# Patient Record
Sex: FEMALE | Race: WHITE | Employment: FULL TIME | URBAN - METROPOLITAN AREA
[De-identification: names, ages, dates, MRNs, and addresses within clinical notes are randomized per-mention and may not be internally consistent; named-entity substitution may affect disease eponyms.]

---

## 2017-02-02 RX ORDER — BUPROPION HYDROCHLORIDE 75 MG/1
75 TABLET ORAL 2 TIMES DAILY
COMMUNITY
Start: 2017-02-02 | End: 2017-02-02 | Stop reason: SDUPTHER

## 2017-02-02 RX ORDER — NALTREXONE HYDROCHLORIDE 50 MG/1
50 TABLET, FILM COATED ORAL DAILY
COMMUNITY
Start: 2017-02-02 | End: 2017-02-02 | Stop reason: SDUPTHER

## 2017-02-06 RX ORDER — BUPROPION HYDROCHLORIDE 75 MG/1
75 TABLET ORAL DAILY
Qty: 90 TABLET | Refills: 3 | Status: SHIPPED | OUTPATIENT
Start: 2017-02-06 | End: 2017-12-29

## 2017-02-06 RX ORDER — NALTREXONE HYDROCHLORIDE 50 MG/1
50 TABLET, FILM COATED ORAL DAILY
Qty: 90 TABLET | Refills: 3 | Status: SHIPPED | OUTPATIENT
Start: 2017-02-06 | End: 2018-01-12 | Stop reason: ALTCHOICE

## 2017-03-22 ENCOUNTER — HOSPITAL ENCOUNTER (OUTPATIENT)
Dept: WOMENS IMAGING | Age: 67
Discharge: HOME | End: 2017-03-22
Attending: OBSTETRICS & GYNECOLOGY | Admitting: OBSTETRICS & GYNECOLOGY

## 2017-03-22 DIAGNOSIS — Z12.31 VISIT FOR SCREENING MAMMOGRAM: ICD-10-CM

## 2017-04-13 ENCOUNTER — EMPLOYEE WELLNESS (OUTPATIENT)
Dept: OTHER | Age: 67
End: 2017-04-13

## 2017-04-13 LAB
CHOLESTEROL: 259 MG/DL
GLUCOSE BLD-MCNC: 81 MG/DL (ref 70–140)
HDLC SERPL-MCNC: 73 MG/DL
LDL CHOLESTEROL CALCULATED: 138 MG/DL
PATIENT FASTING?: YES
TRIGL SERPL-MCNC: 238 MG/DL

## 2017-06-15 ENCOUNTER — HOSPITAL ENCOUNTER (OUTPATIENT)
Dept: GENERAL RADIOLOGY | Age: 67
Discharge: HOME | End: 2017-06-15
Attending: FAMILY MEDICINE | Admitting: FAMILY MEDICINE

## 2017-06-15 LAB
ALBUMIN SERPL-MCNC: 4.2 GM/DL (ref 3.4–5)
ALP BLD-CCNC: 126 IU/L (ref 40–128)
ALT SERPL-CCNC: 14 U/L (ref 10–40)
ANION GAP SERPL CALCULATED.3IONS-SCNC: 14 MMOL/L (ref 4–16)
AST SERPL-CCNC: 13 IU/L (ref 15–37)
BASOPHILS ABSOLUTE: 0.1 K/CU MM
BASOPHILS RELATIVE PERCENT: 0.6 % (ref 0–1)
BILIRUB SERPL-MCNC: 0.7 MG/DL (ref 0–1)
BUN BLDV-MCNC: 18 MG/DL (ref 6–23)
CALCIUM SERPL-MCNC: 9.5 MG/DL (ref 8.3–10.6)
CHLORIDE BLD-SCNC: 97 MMOL/L (ref 99–110)
CHOLESTEROL: 246 MG/DL
CO2: 29 MMOL/L (ref 21–32)
CREAT SERPL-MCNC: 1 MG/DL (ref 0.6–1.1)
DIFFERENTIAL TYPE: ABNORMAL
EOSINOPHILS ABSOLUTE: 0.2 K/CU MM
EOSINOPHILS RELATIVE PERCENT: 1.6 % (ref 0–3)
GFR AFRICAN AMERICAN: >60 ML/MIN/1.73M2
GFR NON-AFRICAN AMERICAN: 55 ML/MIN/1.73M2
GLUCOSE FASTING: 85 MG/DL (ref 70–99)
HCT VFR BLD CALC: 43.5 % (ref 37–47)
HDLC SERPL-MCNC: 72 MG/DL
HEMOGLOBIN: 13.5 GM/DL (ref 12.5–16)
IMMATURE NEUTROPHIL %: 0.4 % (ref 0–0.43)
LDL CHOLESTEROL DIRECT: 177 MG/DL
LYMPHOCYTES ABSOLUTE: 1.7 K/CU MM
LYMPHOCYTES RELATIVE PERCENT: 11.7 % (ref 24–44)
MCH RBC QN AUTO: 28 PG (ref 27–31)
MCHC RBC AUTO-ENTMCNC: 31 % (ref 32–36)
MCV RBC AUTO: 90.2 FL (ref 78–100)
MONOCYTES ABSOLUTE: 1.2 K/CU MM
MONOCYTES RELATIVE PERCENT: 8.6 % (ref 0–4)
NUCLEATED RBC %: 0 %
PDW BLD-RTO: 13.4 % (ref 11.7–14.9)
PLATELET # BLD: 299 K/CU MM (ref 140–440)
PMV BLD AUTO: 10.9 FL (ref 7.5–11.1)
POTASSIUM SERPL-SCNC: 4.4 MMOL/L (ref 3.5–5.1)
RBC # BLD: 4.82 M/CU MM (ref 4.2–5.4)
SEGMENTED NEUTROPHILS ABSOLUTE COUNT: 10.9 K/CU MM
SEGMENTED NEUTROPHILS RELATIVE PERCENT: 77.1 % (ref 36–66)
SODIUM BLD-SCNC: 140 MMOL/L (ref 135–145)
TOTAL IMMATURE NEUTOROPHIL: 0.05 K/CU MM
TOTAL NUCLEATED RBC: 0 K/CU MM
TOTAL PROTEIN: 7 GM/DL (ref 6.4–8.2)
TRIGL SERPL-MCNC: 95 MG/DL
WBC # BLD: 14.2 K/CU MM (ref 4–10.5)

## 2017-06-18 LAB
ENA TO SSA (RO) ANTIBODY: 5
ENA TO SSB (LA) ANTIBODY: 0
SSA 60 RO IGG ANTIBODY: 10

## 2017-09-14 DIAGNOSIS — I82.5Y2 CHRONIC DEEP VEIN THROMBOSIS (DVT) OF PROXIMAL VEIN OF LEFT LOWER EXTREMITY (HCC): Primary | ICD-10-CM

## 2017-09-19 ENCOUNTER — PROCEDURE VISIT (OUTPATIENT)
Dept: CARDIOLOGY CLINIC | Age: 67
End: 2017-09-19

## 2017-09-19 DIAGNOSIS — I82.5Y2 CHRONIC DEEP VEIN THROMBOSIS (DVT) OF PROXIMAL VEIN OF LEFT LOWER EXTREMITY (HCC): Primary | ICD-10-CM

## 2017-09-19 PROCEDURE — 93971 EXTREMITY STUDY: CPT | Performed by: INTERNAL MEDICINE

## 2017-09-20 ENCOUNTER — TELEPHONE (OUTPATIENT)
Dept: CARDIOLOGY CLINIC | Age: 67
End: 2017-09-20

## 2017-09-20 DIAGNOSIS — I87.2 VENOUS REFLUX: Primary | ICD-10-CM

## 2017-12-20 DIAGNOSIS — R06.02 SOBOE (SHORTNESS OF BREATH ON EXERTION): ICD-10-CM

## 2017-12-22 ENCOUNTER — PROCEDURE VISIT (OUTPATIENT)
Dept: CARDIOLOGY CLINIC | Age: 67
End: 2017-12-22

## 2017-12-22 DIAGNOSIS — R06.02 SOBOE (SHORTNESS OF BREATH ON EXERTION): Primary | ICD-10-CM

## 2017-12-22 LAB
LV EF: 55 %
LVEF MODALITY: NORMAL

## 2017-12-22 PROCEDURE — 93306 TTE W/DOPPLER COMPLETE: CPT | Performed by: INTERNAL MEDICINE

## 2018-01-03 PROBLEM — I83.893 VARICOSE VEINS OF LEG WITH SWELLING, BILATERAL: Status: ACTIVE | Noted: 2018-01-03

## 2018-01-11 ENCOUNTER — HOSPITAL ENCOUNTER (OUTPATIENT)
Dept: OTHER | Age: 68
Discharge: HOME | End: 2018-01-11
Attending: OBSTETRICS & GYNECOLOGY | Admitting: OBSTETRICS & GYNECOLOGY

## 2018-01-17 PROBLEM — M79.89 LEFT LEG SWELLING: Status: ACTIVE | Noted: 2018-01-17

## 2018-02-12 ENCOUNTER — HOSPITAL ENCOUNTER (OUTPATIENT)
Dept: MRI IMAGING | Age: 68
Discharge: HOME | End: 2018-02-12
Attending: FAMILY MEDICINE | Admitting: FAMILY MEDICINE

## 2018-02-12 DIAGNOSIS — M54.5 LOW BACK PAIN, UNSPECIFIED BACK PAIN LATERALITY, UNSPECIFIED CHRONICITY, WITH SCIATICA PRESENCE UNSPECIFIED: ICD-10-CM

## 2018-03-20 VITALS — WEIGHT: 216 LBS | BODY MASS INDEX: 37.08 KG/M2

## 2018-04-19 ENCOUNTER — EMPLOYEE WELLNESS (OUTPATIENT)
Dept: OTHER | Age: 68
End: 2018-04-19

## 2018-04-19 LAB
CHOLESTEROL: 271 MG/DL
GLUCOSE BLD-MCNC: 79 MG/DL (ref 70–99)
HDLC SERPL-MCNC: 72 MG/DL
LDL CHOLESTEROL CALCULATED: 170 MG/DL
PATIENT FASTING?: YES
TRIGL SERPL-MCNC: 146 MG/DL

## 2018-04-23 VITALS — BODY MASS INDEX: 36.73 KG/M2 | WEIGHT: 214 LBS

## 2018-04-30 ENCOUNTER — HOSPITAL ENCOUNTER (OUTPATIENT)
Dept: WOMENS IMAGING | Age: 68
Discharge: HOME | End: 2018-04-30
Attending: OBSTETRICS & GYNECOLOGY | Admitting: OBSTETRICS & GYNECOLOGY

## 2018-04-30 DIAGNOSIS — Z12.31 VISIT FOR SCREENING MAMMOGRAM: ICD-10-CM

## 2018-09-28 ENCOUNTER — HOSPITAL ENCOUNTER (OUTPATIENT)
Dept: GENERAL RADIOLOGY | Age: 68
Discharge: HOME | End: 2018-09-28
Payer: COMMERCIAL

## 2018-09-28 ENCOUNTER — HOSPITAL ENCOUNTER (OUTPATIENT)
Age: 68
Discharge: HOME | End: 2018-09-28
Payer: COMMERCIAL

## 2018-09-28 DIAGNOSIS — R06.00 DYSPNEA, UNSPECIFIED TYPE: ICD-10-CM

## 2018-09-28 DIAGNOSIS — R52 PAIN: ICD-10-CM

## 2018-09-28 LAB
BASOPHILS ABSOLUTE: 0.1 K/CU MM
BASOPHILS RELATIVE PERCENT: 0.5 % (ref 0–1)
CHOLESTEROL: 271 MG/DL
DIFFERENTIAL TYPE: ABNORMAL
EOSINOPHILS ABSOLUTE: 0 K/CU MM
EOSINOPHILS RELATIVE PERCENT: 0 % (ref 0–3)
HCT VFR BLD CALC: 49.5 % (ref 37–47)
HDLC SERPL-MCNC: 73 MG/DL
HEMOGLOBIN: 14.6 GM/DL (ref 12.5–16)
IMMATURE NEUTROPHIL %: 0.8 % (ref 0–0.43)
LDL CHOLESTEROL DIRECT: 182 MG/DL
LYMPHOCYTES ABSOLUTE: 1.3 K/CU MM
LYMPHOCYTES RELATIVE PERCENT: 11.2 % (ref 24–44)
MCH RBC QN AUTO: 28.7 PG (ref 27–31)
MCHC RBC AUTO-ENTMCNC: 29.5 % (ref 32–36)
MCV RBC AUTO: 97.2 FL (ref 78–100)
MONOCYTES ABSOLUTE: 0.2 K/CU MM
MONOCYTES RELATIVE PERCENT: 2 % (ref 0–4)
NUCLEATED RBC %: 0 %
PDW BLD-RTO: 12.7 % (ref 11.7–14.9)
PLATELET # BLD: 271 K/CU MM (ref 140–440)
PMV BLD AUTO: 11.3 FL (ref 7.5–11.1)
RBC # BLD: 5.09 M/CU MM (ref 4.2–5.4)
REASON FOR REJECTION: NORMAL
REJECTED TEST: NORMAL
SEGMENTED NEUTROPHILS ABSOLUTE COUNT: 9.6 K/CU MM
SEGMENTED NEUTROPHILS RELATIVE PERCENT: 85.5 % (ref 36–66)
SOURCE: NORMAL
TOTAL IMMATURE NEUTOROPHIL: 0.09 K/CU MM
TOTAL NUCLEATED RBC: 0 K/CU MM
TRIGL SERPL-MCNC: 118 MG/DL
TSH HIGH SENSITIVITY: 0.78 UIU/ML (ref 0.27–4.2)
URIC ACID: 6.9 MG/DL (ref 2.6–6)
WBC # BLD: 11.2 K/CU MM (ref 4–10.5)

## 2018-09-28 PROCEDURE — 85025 COMPLETE CBC W/AUTO DIFF WBC: CPT

## 2018-09-28 PROCEDURE — 83721 ASSAY OF BLOOD LIPOPROTEIN: CPT

## 2018-09-28 PROCEDURE — 71046 X-RAY EXAM CHEST 2 VIEWS: CPT

## 2018-09-28 PROCEDURE — 80061 LIPID PANEL: CPT

## 2018-09-28 PROCEDURE — 84443 ASSAY THYROID STIM HORMONE: CPT

## 2018-09-28 PROCEDURE — 36415 COLL VENOUS BLD VENIPUNCTURE: CPT

## 2018-09-28 PROCEDURE — 84550 ASSAY OF BLOOD/URIC ACID: CPT

## 2018-09-28 PROCEDURE — 73140 X-RAY EXAM OF FINGER(S): CPT

## 2018-10-11 ENCOUNTER — HOSPITAL ENCOUNTER (OUTPATIENT)
Age: 68
Setting detail: SPECIMEN
Discharge: HOME | End: 2018-10-11
Payer: COMMERCIAL

## 2018-10-11 LAB
ALBUMIN SERPL-MCNC: 4.4 GM/DL (ref 3.4–5)
ALP BLD-CCNC: 114 IU/L (ref 40–128)
ALT SERPL-CCNC: 24 U/L (ref 10–40)
ANION GAP SERPL CALCULATED.3IONS-SCNC: 16 MMOL/L (ref 4–16)
AST SERPL-CCNC: 19 IU/L (ref 15–37)
BILIRUB SERPL-MCNC: 0.4 MG/DL (ref 0–1)
BUN BLDV-MCNC: 19 MG/DL (ref 6–23)
CALCIUM SERPL-MCNC: 9.4 MG/DL (ref 8.3–10.6)
CHLORIDE BLD-SCNC: 99 MMOL/L (ref 99–110)
CO2: 25 MMOL/L (ref 21–32)
CREAT SERPL-MCNC: 0.9 MG/DL (ref 0.6–1.1)
GFR AFRICAN AMERICAN: >60 ML/MIN/1.73M2
GFR NON-AFRICAN AMERICAN: >60 ML/MIN/1.73M2
GLUCOSE BLD-MCNC: 91 MG/DL (ref 70–99)
POTASSIUM SERPL-SCNC: 3.7 MMOL/L (ref 3.5–5.1)
SODIUM BLD-SCNC: 140 MMOL/L (ref 135–145)
TOTAL PROTEIN: 7.2 GM/DL (ref 6.4–8.2)

## 2018-10-11 PROCEDURE — 36415 COLL VENOUS BLD VENIPUNCTURE: CPT

## 2018-10-11 PROCEDURE — 80053 COMPREHEN METABOLIC PANEL: CPT

## 2018-11-27 ENCOUNTER — HOSPITAL ENCOUNTER (OUTPATIENT)
Dept: PHYSICAL THERAPY | Age: 68
Setting detail: THERAPIES SERIES
Discharge: HOME | End: 2018-11-27
Payer: COMMERCIAL

## 2018-11-27 PROCEDURE — G8979 MOBILITY GOAL STATUS: HCPCS

## 2018-11-27 PROCEDURE — G8978 MOBILITY CURRENT STATUS: HCPCS

## 2018-11-27 PROCEDURE — 97162 PT EVAL MOD COMPLEX 30 MIN: CPT

## 2018-11-27 PROCEDURE — 97140 MANUAL THERAPY 1/> REGIONS: CPT

## 2018-11-27 PROCEDURE — 97110 THERAPEUTIC EXERCISES: CPT

## 2018-11-27 ASSESSMENT — PAIN DESCRIPTION - LOCATION: LOCATION: BACK

## 2018-11-27 ASSESSMENT — PAIN DESCRIPTION - DESCRIPTORS: DESCRIPTORS: ACHING;SHARP;THROBBING

## 2018-11-27 ASSESSMENT — PAIN DESCRIPTION - FREQUENCY: FREQUENCY: INTERMITTENT

## 2018-11-27 ASSESSMENT — PAIN SCALES - GENERAL: PAINLEVEL_OUTOF10: 5

## 2018-11-27 ASSESSMENT — PAIN DESCRIPTION - ORIENTATION: ORIENTATION: RIGHT;LEFT;LOWER

## 2018-11-27 ASSESSMENT — PAIN DESCRIPTION - PAIN TYPE: TYPE: CHRONIC PAIN

## 2018-11-27 NOTE — FLOWSHEET NOTE
advised that doing stabilization ex may be the best for her spine rehab. Objectives: See Eval     Response to intervention: Pt was about the same     Post Tx Pain Ratin/10 at rest; 5/10 with standing     Overall change since Evaluation:      Prognosis: FAIR     Plan for Next Session: Plan to advance the exercises for trunk stability, jose LE strengthening and flexibility.        Plan:  _2_x/week x 5__ weeks    Intervention used today:  [x] Therapeutic Exercise    [] Therapeutic Activity     [] Ultrasound  [] Elec  Stim  [] Gait Training      [] Cervical Traction [] Lumbar Traction  [] Neuromuscular Re-education    [] Cold/hotpack [] Iontophoresis   [x] Instruction in HEP      [] Vasopneumatic     [x] Manual Therapy               [x] Self care home management                    (    ) Dry needling    Time In: 0427  Time Out: 1288  Timed Code Treatment Minutes: 23   Total Treatment Minutes: 40     Electronically signed by:  Arianna Bruner PT 2018, 4:00 PM

## 2018-11-27 NOTE — PLAN OF CARE
Re-education [x] Cold/hotpack [] Iontophoresis   [x] Instruction in HEP       [x] Manual Therapy     [] vasopneumatic            [x] Self care home management        [x]Dry needling trigger point point/pain management      Plan of Care Date Range:  11/27/18 to 1/8/2019    ? Frequency/Duration:  # Days per week: [] 1 day # Weeks: [] 1 week [x] 5 weeks     [x] 2 days? [] 2 weeks [] 6 weeks     [] 3 days   [] 3 weeks [] 7 weeks     [] 4 days   [] 4 weeks [] 8 weeks    Rehab Potential: [] Excellent [] Good [x] Fair  [] Poor     Goals:     Long term goals  Time Frame for Long term goals : All goals to be assessed by the 10th visit  Long term goal 1: Pt to be independent with HEP  Long term goal 2: Pt to increase jose LE strength  Long term goal 3: Pt to report overall pain decreased  Long term goal 4: Pt to improve Oswestry score    Electronically signed by:  Kristin Anguiano PT, 11/27/2018, 3:58 PM          If you have any questions or concerns, please don't hesitate to call.   Thank you for your referral.    Physician Signature:_________________Date:____________Time: ________  By signing above, therapists plan is approved by physician

## 2018-12-03 ENCOUNTER — TELEPHONE (OUTPATIENT)
Dept: CARDIOLOGY CLINIC | Age: 68
End: 2018-12-03

## 2018-12-06 ENCOUNTER — HOSPITAL ENCOUNTER (OUTPATIENT)
Dept: PHYSICAL THERAPY | Age: 68
Setting detail: THERAPIES SERIES
Discharge: HOME | End: 2018-12-06
Payer: COMMERCIAL

## 2018-12-06 ENCOUNTER — HOSPITAL ENCOUNTER (OUTPATIENT)
Dept: CT IMAGING | Age: 68
Discharge: HOME | End: 2018-12-06
Payer: COMMERCIAL

## 2018-12-06 DIAGNOSIS — R06.00 DYSPNEA AND RESPIRATORY ABNORMALITY: ICD-10-CM

## 2018-12-06 DIAGNOSIS — R06.89 DYSPNEA AND RESPIRATORY ABNORMALITY: ICD-10-CM

## 2018-12-06 LAB
GFR AFRICAN AMERICAN: 54 ML/MIN/1.73M2
GFR NON-AFRICAN AMERICAN: 44 ML/MIN/1.73M2
POC CREATININE: 1.2 MG/DL (ref 0.6–1.1)

## 2018-12-06 PROCEDURE — 97112 NEUROMUSCULAR REEDUCATION: CPT

## 2018-12-06 PROCEDURE — 6360000004 HC RX CONTRAST MEDICATION: Performed by: FAMILY MEDICINE

## 2018-12-06 PROCEDURE — 71275 CT ANGIOGRAPHY CHEST: CPT

## 2018-12-06 PROCEDURE — 2580000003 HC RX 258: Performed by: FAMILY MEDICINE

## 2018-12-06 PROCEDURE — 97110 THERAPEUTIC EXERCISES: CPT

## 2018-12-06 RX ORDER — 0.9 % SODIUM CHLORIDE 0.9 %
10 VIAL (ML) INJECTION PRN
Status: DISCONTINUED | OUTPATIENT
Start: 2018-12-06 | End: 2018-12-07 | Stop reason: HOSPADM

## 2018-12-06 RX ADMIN — SODIUM CHLORIDE, PRESERVATIVE FREE 10 ML: 5 INJECTION INTRAVENOUS at 11:35

## 2018-12-06 RX ADMIN — IOPAMIDOL 75 ML: 755 INJECTION, SOLUTION INTRAVENOUS at 11:34

## 2018-12-11 ENCOUNTER — HOSPITAL ENCOUNTER (OUTPATIENT)
Dept: PHYSICAL THERAPY | Age: 68
Discharge: HOME | End: 2018-12-11

## 2018-12-11 NOTE — FLOWSHEET NOTE
Physical Therapy  Cancellation/No-show Note  Patient Name:  Humberto Gonzáles  :  1950   Date:  2018  Cancelled visits to date: 1  No-shows to date: 0    For today's appointment patient:  [x]  Cancelled  []  Rescheduled appointment  []  No-show     Reason given by patient:  []  Patient ill  []  Conflicting appointment  []  No transportation    []  Conflict with work  [x]  No reason given to   []  Other:     Comments:      Electronically signed by:  Volodymyr Chopra PTA, 2018, 8:52 AM

## 2018-12-17 DIAGNOSIS — R07.89 OTHER CHEST PAIN: Primary | ICD-10-CM

## 2018-12-17 DIAGNOSIS — R06.02 SOBOE (SHORTNESS OF BREATH ON EXERTION): ICD-10-CM

## 2018-12-18 ENCOUNTER — HOSPITAL ENCOUNTER (OUTPATIENT)
Dept: PHYSICAL THERAPY | Age: 68
Setting detail: THERAPIES SERIES
Discharge: HOME | End: 2018-12-18
Payer: COMMERCIAL

## 2018-12-18 ENCOUNTER — PROCEDURE VISIT (OUTPATIENT)
Dept: CARDIOLOGY CLINIC | Age: 68
End: 2018-12-18

## 2018-12-18 DIAGNOSIS — R06.02 SOBOE (SHORTNESS OF BREATH ON EXERTION): Primary | ICD-10-CM

## 2018-12-18 DIAGNOSIS — R07.89 OTHER CHEST PAIN: ICD-10-CM

## 2018-12-18 LAB
LV EF: 58 %
LVEF MODALITY: NORMAL

## 2018-12-18 PROCEDURE — 97110 THERAPEUTIC EXERCISES: CPT

## 2018-12-18 NOTE — FLOWSHEET NOTE
Physical Therapy Daily Treatment Note  Date:  2018    Patient Name:  Ricardo Lance    :  1950  MRN: [id number]  Other position/activity restrictions: None noted   Diagnosis: Severe LBP, OA spine with spinal stenosis  Treatment Diagnosis: Muscular weakness, decreased function, pain  Date of injury/Date of Surgery  PT Insurance Information: [employer]  Referring Practitioner: Josiah Ewing MD  Referring Practitioner Follow-Up:     POC Signed: pending  POC Date Range:18 to 2019    Progress Note Due:  10th visit  Visit# / total visits: 3 10                    Goals:     Long term goals  Time Frame for Long term goals : All goals to be assessed by the 10th visit  Long term goal 1: Pt to be independent with HEP  Long term goal 2: Pt to increase jose LE strength  Long term goal 3: Pt to report overall pain decreased  Long term goal 4: Pt to improve Oswestry score       Summary of Eval: Pt has severe pain in the low back, can't stand very long, can't walk very long (100 yards) without leaning on something. She can stand only 20 seconds to be able to do dishes then she is miserable. Pt does better if she is upright. Pt has 0/10 pain with sitting, standing 5/10. Pt had to leave early for another appointment and was going to call later to schedule appointments. INITIAL PAIN LEVEL:  0/10 At rest   SUBJECTIVE:  Pt states she is about the same// with walking I can walk 5-10 minutes    Any changes to Ambulatory Summary Sheet? Any major status changes?      ACTIVITIES: Date 18 (1) Date 18 (2) [id number] #3   Outcomes Measure      nustep seat 10/arms11 lv3        Abdominal bracing (seated) 3 ct x 10 reps 3 ct x 10 reps      PPT  Supine 3 ct x 10 reps Supine 3 ct x 10 reps     LTR  X 10 reps 3 ct Supine 3 ct x 10 reps     Hamstring stretch  10 ct x 3 reps ea 10 ct x 3 reps ea     Calf stretch        PPT w/ marching        SLR  X 10 reps X 10 reps R/L     Bridging    2 x 5 reps

## 2018-12-27 ENCOUNTER — HOSPITAL ENCOUNTER (OUTPATIENT)
Dept: PHYSICAL THERAPY | Age: 68
Setting detail: THERAPIES SERIES
Discharge: HOME | End: 2018-12-27
Payer: COMMERCIAL

## 2018-12-27 PROCEDURE — 97112 NEUROMUSCULAR REEDUCATION: CPT

## 2018-12-27 PROCEDURE — 97110 THERAPEUTIC EXERCISES: CPT

## 2019-01-02 ENCOUNTER — HOSPITAL ENCOUNTER (OUTPATIENT)
Dept: PHYSICAL THERAPY | Age: 69
Setting detail: THERAPIES SERIES
Discharge: HOME | End: 2019-01-02
Payer: COMMERCIAL

## 2019-02-18 ENCOUNTER — OFFICE VISIT (OUTPATIENT)
Dept: ORTHOPEDIC SURGERY | Age: 69
End: 2019-02-18
Payer: COMMERCIAL

## 2019-02-18 VITALS
HEART RATE: 86 BPM | OXYGEN SATURATION: 93 % | BODY MASS INDEX: 46.86 KG/M2 | WEIGHT: 273 LBS | RESPIRATION RATE: 14 BRPM

## 2019-02-18 DIAGNOSIS — M17.12 PRIMARY OSTEOARTHRITIS OF LEFT KNEE: Primary | ICD-10-CM

## 2019-02-18 PROCEDURE — 99203 OFFICE O/P NEW LOW 30 MIN: CPT | Performed by: ORTHOPAEDIC SURGERY

## 2019-02-18 RX ORDER — MELOXICAM 10 MG/1
CAPSULE ORAL
COMMUNITY
End: 2019-04-19

## 2019-02-18 ASSESSMENT — ENCOUNTER SYMPTOMS
BACK PAIN: 0
COLOR CHANGE: 0
CHEST TIGHTNESS: 0

## 2019-03-28 ENCOUNTER — NURSE ONLY (OUTPATIENT)
Dept: CARDIOLOGY CLINIC | Age: 69
End: 2019-03-28
Payer: COMMERCIAL

## 2019-03-28 VITALS
BODY MASS INDEX: 40.46 KG/M2 | HEIGHT: 64 IN | DIASTOLIC BLOOD PRESSURE: 78 MMHG | HEART RATE: 75 BPM | WEIGHT: 237 LBS | SYSTOLIC BLOOD PRESSURE: 122 MMHG

## 2019-03-28 DIAGNOSIS — E78.2 MIXED HYPERLIPIDEMIA: Chronic | ICD-10-CM

## 2019-03-28 DIAGNOSIS — E78.2 MIXED HYPERLIPIDEMIA: Primary | Chronic | ICD-10-CM

## 2019-03-28 PROCEDURE — 93000 ELECTROCARDIOGRAM COMPLETE: CPT | Performed by: INTERNAL MEDICINE

## 2019-04-02 ENCOUNTER — TELEPHONE (OUTPATIENT)
Dept: ORTHOPEDIC SURGERY | Age: 69
End: 2019-04-02

## 2019-04-02 NOTE — TELEPHONE ENCOUNTER
Pt called after reviewing FMLA and she would like to know why her surgery date was changed to  5/7/19. She states that she was unaware of the surgery date change. Instructions 2/18/19         Return in about 2 months (around 4/18/2019). Follow up in April to consult on left knee.   Schedule for April 23 for surgery  Continue weight bearing as tolerated

## 2019-04-05 ENCOUNTER — TELEPHONE (OUTPATIENT)
Dept: ORTHOPEDIC SURGERY | Age: 69
End: 2019-04-05

## 2019-04-05 ENCOUNTER — TELEPHONE (OUTPATIENT)
Dept: CARDIOLOGY CLINIC | Age: 69
End: 2019-04-05

## 2019-04-05 NOTE — TELEPHONE ENCOUNTER
Patient called asking if her medical leave certification was completed with the new surgery date and faxed in

## 2019-04-05 NOTE — TELEPHONE ENCOUNTER
Cardiac clearance request received from Dr. Gabriella Okeefe for L knee TKA scheduled  4/23/19. FAX # [id number]    Patient is cleared for surgery/procedure at a moderate risk per Dr. Robert Espinoza.   Letter completed and faxed

## 2019-04-05 NOTE — TELEPHONE ENCOUNTER
Attempted to contact patient to discuss questions regarding moving joint camp appointment, but patient was unable to be reached. LMOVM for patient to return call to our office. Please inform patient that we cannot move appointment due to joint camp not having availability at this time. All classes are full.

## 2019-04-15 ENCOUNTER — OFFICE VISIT (OUTPATIENT)
Dept: ORTHOPEDIC SURGERY | Age: 69
End: 2019-04-15
Payer: COMMERCIAL

## 2019-04-15 VITALS — HEIGHT: 64 IN | WEIGHT: 240 LBS | OXYGEN SATURATION: 98 % | HEART RATE: 68 BPM | BODY MASS INDEX: 40.97 KG/M2

## 2019-04-15 DIAGNOSIS — M17.12 PRIMARY OSTEOARTHRITIS OF LEFT KNEE: Primary | ICD-10-CM

## 2019-04-15 PROCEDURE — 99212 OFFICE O/P EST SF 10 MIN: CPT | Performed by: ORTHOPAEDIC SURGERY

## 2019-04-15 RX ORDER — LOSARTAN POTASSIUM 100 MG/1
100 TABLET ORAL DAILY
COMMUNITY
End: 2019-04-19

## 2019-04-15 RX ORDER — MAGNESIUM GLUCONATE 27 MG(500)
500 TABLET ORAL DAILY
COMMUNITY

## 2019-04-16 ENCOUNTER — ANESTHESIA EVENT (OUTPATIENT)
Dept: OPERATING ROOM | Age: 69
DRG: 470 | End: 2019-04-16
Payer: COMMERCIAL

## 2019-04-17 ENCOUNTER — TELEPHONE (OUTPATIENT)
Dept: ORTHOPEDIC SURGERY | Age: 69
End: 2019-04-17

## 2019-04-17 ASSESSMENT — ENCOUNTER SYMPTOMS: SHORTNESS OF BREATH: 1

## 2019-04-17 NOTE — TELEPHONE ENCOUNTER
Called and spoke with patient regarding her Lovenox. Per patient she has called and spoke with Nisha Smith in Anesthesiology at [facility] and was informed if it is a General it was 24 hours and if it is a Block it is 72 hours. Per patient in the office Dr. Delmy Ladd told her it was a Block. Informed patient that Dr. Delmy Ladd is in surgery and as soon as he addresses this question I will be back in touch with her. Patient states understanding. Austen Morris MA called surgery scheduling and updated the Anaesthesilogy to a block per Dr. Danay Beltran request. Once Dr. Delmy Ladd responds, please call patient and inform, ASAP.  Negra BOWIE

## 2019-04-17 NOTE — TELEPHONE ENCOUNTER
Left message with instructions to stop her Xarelto start taking lovenox 40 daily, perscription was called into [facility] on [address]. Her last dose is to be taken Monday morning before surgery.

## 2019-04-17 NOTE — ANESTHESIA PRE PROCEDURE
 Hyperlipemia E78.5    Obesity E66.9    DVT of leg (deep venous thrombosis)-L I82.409    SOBOE (shortness of breath on exertion) R06.02    Varicose veins of lower extremities with complications, bilateral I83.893    Left leg swelling M79.89    Other chest pain R07.89       Past Medical History:        Diagnosis Date    Arthritis     H/O Doppler ultrasound 11/2/14    Venous Doppler. Left lower extremity probably old DVT in the distal SFA and popliteal areas otherwise right lower extremity reveals normal findings.  H/O Doppler ultrasound 09/19/2017    LLE - chronic DVT, severe veous reflux    H/O echocardiogram 09/26/2016    EF60% mild MR, TR, mild pulm htn    H/O echocardiogram 07/18/2018    EF55-60% mild TR    Hx of blood clots     \"hx of PE- 2013 and had DVT left leg that went to my lung\"    Hx of Doppler echocardiogram 12/22/2017    EF 50-60% mild biatrial enlargement, mild to mod TR, mildly elevated pulmonary artery pressure.  Hyperlipidemia     Hypertension     Other chest pain 12/17/2018    Spinal stenosis     scheduled for epidural injection 3/19/2018       Past Surgical History:        Procedure Laterality Date    ANKLE SURGERY Left 12+ yrs ago    \"have plate and pins still\"    COLONOSCOPY  2010?  DILATION AND CURETTAGE OF UTERUS  May 2012    TONSILLECTOMY         Social History:    Social History     Tobacco Use    Smoking status: Never Smoker    Smokeless tobacco: Never Used   Substance Use Topics    Alcohol use: Yes     Comment: average\"one time per month\"                                Counseling given: Not Answered      Vital Signs (Current): There were no vitals filed for this visit.                                            BP Readings from Last 3 Encounters:   03/28/19 122/78   05/14/18 138/71   03/19/18 121/69       NPO Status:                                                                                 BMI:   Wt Readings from Last 3 Encounters:   04/15/19 240 lb

## 2019-04-17 NOTE — TELEPHONE ENCOUNTER
Per Dr. Garcia, stop Xarelto, put her on lovenox 40 daily, last dose Monday 4/22/19 am before surgery on Tuesday 4/23/19.

## 2019-04-19 ENCOUNTER — HOSPITAL ENCOUNTER (OUTPATIENT)
Dept: PREADMISSION TESTING | Age: 69
Discharge: HOME | End: 2019-04-23
Payer: COMMERCIAL

## 2019-04-19 VITALS
RESPIRATION RATE: 16 BRPM | DIASTOLIC BLOOD PRESSURE: 77 MMHG | BODY MASS INDEX: 40.97 KG/M2 | HEIGHT: 64 IN | HEART RATE: 84 BPM | WEIGHT: 240 LBS | OXYGEN SATURATION: 96 % | SYSTOLIC BLOOD PRESSURE: 180 MMHG | TEMPERATURE: 99.2 F

## 2019-04-19 LAB
ABO/RH: NORMAL
ALBUMIN SERPL-MCNC: 4.3 GM/DL (ref 3.4–5)
ALP BLD-CCNC: 103 IU/L (ref 40–128)
ALT SERPL-CCNC: 22 U/L (ref 10–40)
ANION GAP SERPL CALCULATED.3IONS-SCNC: 15 MMOL/L (ref 4–16)
ANTIBODY SCREEN: NEGATIVE
AST SERPL-CCNC: 21 IU/L (ref 15–37)
BASOPHILS ABSOLUTE: 0.1 K/CU MM
BASOPHILS RELATIVE PERCENT: 1.2 % (ref 0–1)
BILIRUB SERPL-MCNC: 0.7 MG/DL (ref 0–1)
BUN BLDV-MCNC: 21 MG/DL (ref 6–23)
CALCIUM SERPL-MCNC: 9.7 MG/DL (ref 8.3–10.6)
CHLORIDE BLD-SCNC: 104 MMOL/L (ref 99–110)
CO2: 23 MMOL/L (ref 21–32)
COMMENT: NORMAL
CREAT SERPL-MCNC: 1 MG/DL (ref 0.6–1.1)
DIFFERENTIAL TYPE: ABNORMAL
EOSINOPHILS ABSOLUTE: 0.3 K/CU MM
EOSINOPHILS RELATIVE PERCENT: 3.6 % (ref 0–3)
ERYTHROCYTE SEDIMENTATION RATE: 15 MM/HR (ref 0–30)
GFR AFRICAN AMERICAN: >60 ML/MIN/1.73M2
GFR NON-AFRICAN AMERICAN: 55 ML/MIN/1.73M2
GLUCOSE BLD-MCNC: 100 MG/DL (ref 70–99)
HCT VFR BLD CALC: 47.5 % (ref 37–47)
HEMOGLOBIN: 14.3 GM/DL (ref 12.5–16)
IMMATURE NEUTROPHIL %: 0.3 % (ref 0–0.43)
LYMPHOCYTES ABSOLUTE: 1.7 K/CU MM
LYMPHOCYTES RELATIVE PERCENT: 22.2 % (ref 24–44)
MCH RBC QN AUTO: 27.1 PG (ref 27–31)
MCHC RBC AUTO-ENTMCNC: 30.1 % (ref 32–36)
MCV RBC AUTO: 90.1 FL (ref 78–100)
MONOCYTES ABSOLUTE: 0.7 K/CU MM
MONOCYTES RELATIVE PERCENT: 9.2 % (ref 0–4)
NUCLEATED RBC %: 0 %
PDW BLD-RTO: 13.9 % (ref 11.7–14.9)
PLATELET # BLD: 308 K/CU MM (ref 140–440)
PMV BLD AUTO: 10.8 FL (ref 7.5–11.1)
POTASSIUM SERPL-SCNC: 3.8 MMOL/L (ref 3.5–5.1)
RBC # BLD: 5.27 M/CU MM (ref 4.2–5.4)
SEGMENTED NEUTROPHILS ABSOLUTE COUNT: 4.9 K/CU MM
SEGMENTED NEUTROPHILS RELATIVE PERCENT: 63.5 % (ref 36–66)
SODIUM BLD-SCNC: 142 MMOL/L (ref 135–145)
TOTAL IMMATURE NEUTOROPHIL: 0.02 K/CU MM
TOTAL NUCLEATED RBC: 0 K/CU MM
TOTAL PROTEIN: 6.6 GM/DL (ref 6.4–8.2)
WBC # BLD: 7.7 K/CU MM (ref 4–10.5)

## 2019-04-19 PROCEDURE — 36415 COLL VENOUS BLD VENIPUNCTURE: CPT

## 2019-04-19 PROCEDURE — 86901 BLOOD TYPING SEROLOGIC RH(D): CPT

## 2019-04-19 PROCEDURE — 85652 RBC SED RATE AUTOMATED: CPT

## 2019-04-19 PROCEDURE — 86900 BLOOD TYPING SEROLOGIC ABO: CPT

## 2019-04-19 PROCEDURE — 80053 COMPREHEN METABOLIC PANEL: CPT

## 2019-04-19 PROCEDURE — 85025 COMPLETE CBC W/AUTO DIFF WBC: CPT

## 2019-04-19 PROCEDURE — 87086 URINE CULTURE/COLONY COUNT: CPT

## 2019-04-19 PROCEDURE — 86850 RBC ANTIBODY SCREEN: CPT

## 2019-04-19 RX ORDER — MELOXICAM 7.5 MG/1
7.5 TABLET ORAL DAILY
COMMUNITY
End: 2019-06-11

## 2019-04-19 RX ORDER — SODIUM CHLORIDE, SODIUM LACTATE, POTASSIUM CHLORIDE, CALCIUM CHLORIDE 600; 310; 30; 20 MG/100ML; MG/100ML; MG/100ML; MG/100ML
INJECTION, SOLUTION INTRAVENOUS CONTINUOUS
Status: CANCELLED | OUTPATIENT
Start: 2019-04-23

## 2019-04-19 RX ORDER — LOSARTAN POTASSIUM AND HYDROCHLOROTHIAZIDE 25; 100 MG/1; MG/1
1 TABLET ORAL DAILY
COMMUNITY
End: 2019-07-17 | Stop reason: SDUPTHER

## 2019-04-19 RX ORDER — CEFAZOLIN SODIUM 2 G/100ML
2 INJECTION, SOLUTION INTRAVENOUS ONCE
Status: CANCELLED | OUTPATIENT
Start: 2019-04-23

## 2019-04-20 LAB
CULTURE: NORMAL
Lab: NORMAL
SPECIMEN: NORMAL

## 2019-04-20 ASSESSMENT — ENCOUNTER SYMPTOMS
CHEST TIGHTNESS: 0
BACK PAIN: 0
COLOR CHANGE: 0

## 2019-04-20 NOTE — PROGRESS NOTES
Subjective:      Patient ID: Laura Majano is a 70 y.o. female. Patient comes in for her Pre op appointment on her left knee. Tip Ferreira is a 69 y/o female coming in for her left knee    She states that since I saw her last her symptoms have remained unchanged. She describes the pain as aching, throbbing and grinding globally in her left knee which is worse when going up and down stairs or standing for prolonged period of time. For this she has received multiple cortisone injections in the past which were helping at first but seemed to not be lasting as long anymore. Patient denies any new injury to the involved extremity/ joint, denies numbness or tingling in the involved extremity and denies fever or chills. 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 Respiratory: Negative for chest tightness. Cardiovascular: Negative for chest pain. Musculoskeletal: Positive for arthralgias, gait problem, joint swelling and myalgias. Negative for back pain. Skin: Negative for color change, pallor, rash and wound. Neurological: Negative for weakness and numbness. Objective:   Physical Exam   Constitutional: She is oriented to person, place, and time. Vital signs are normal. She appears well-developed and well-nourished. HENT:   Head: Normocephalic and atraumatic. Eyes: Pupils are equal, round, and reactive to light. Neck: Normal range of motion. Musculoskeletal: Normal range of motion. She exhibits edema and tenderness. She exhibits no deformity. Right hip: Normal.        Left hip: Normal.        Right knee: She exhibits normal range of motion, no swelling, no effusion, no ecchymosis, no deformity, no laceration, no erythema, normal alignment, no LCL laxity, normal patellar mobility, no bony tenderness and no MCL laxity. No tenderness found.  No medial joint line, no lateral joint line and no patellar tendon tenderness noted. Left knee: She exhibits swelling, bony tenderness and MCL laxity. She exhibits normal range of motion, no effusion, no ecchymosis, no deformity, no laceration, no erythema, normal alignment, no LCL laxity and normal patellar mobility. Tenderness found. Medial joint line, lateral joint line and patellar tendon tenderness noted. Neurological: She is alert and oriented to person, place, and time. She has normal strength. No sensory deficit. Skin: Skin is warm and dry. No rash noted. No erythema. No pallor. Left knee-Skin intact with no erythema, ecchymosis or lacerations present. Full range of motion. XRAY  X-ray 3 view of the left knee reviewed by me today in the office demonstrates severe degenerative changes with medial joint space collapse and moderate lateral and patellofemoral joint space narrowing, moderate osteophyte formation in all 3 compartments as well as posterior, normal tracking of the patella, age-appropriate bone density throughout, no acute osseous abnormalities, no bony prominences, no loose bodies. Assessment:      Left knee DJD, severe      Plan:      I discussed with her again today her x-ray findings. I explained to her that she does have severe arthritis in her left knee. I answered all of her questions today in regards to her upcoming left knee replacement surgery. I had a lengthy discussion with her today in regards to left total knee replacement surgery  I explained risks, benefits, possible complications of the procedure and answered all questions for the patient. The patient understands and consents to the procedure. We will schedule surgery at soonest convenience. I explained postoperative rehabilitation protocol and expectations with the patient today. Patient will follow up with their primary care physician prior to surgical treatment for preoperative clearance. Continue weight-bearing as tolerated.   Continue range of motion exercises as instructed. Ice and elevate as needed. Tylenol or Motrin for pain. Follow-up will be 1 week postop.         Mireille Rai, DO

## 2019-04-23 ENCOUNTER — APPOINTMENT (OUTPATIENT)
Dept: GENERAL RADIOLOGY | Age: 69
DRG: 470 | End: 2019-04-23
Attending: ORTHOPAEDIC SURGERY
Payer: COMMERCIAL

## 2019-04-23 ENCOUNTER — ANESTHESIA (OUTPATIENT)
Dept: OPERATING ROOM | Age: 69
DRG: 470 | End: 2019-04-23
Payer: COMMERCIAL

## 2019-04-23 ENCOUNTER — HOSPITAL ENCOUNTER (INPATIENT)
Age: 69
LOS: 2 days | Discharge: HOME HEALTH | DRG: 470 | End: 2019-04-25
Attending: ORTHOPAEDIC SURGERY | Admitting: ORTHOPAEDIC SURGERY
Payer: COMMERCIAL

## 2019-04-23 VITALS
RESPIRATION RATE: 5 BRPM | SYSTOLIC BLOOD PRESSURE: 135 MMHG | DIASTOLIC BLOOD PRESSURE: 80 MMHG | TEMPERATURE: 96.2 F | OXYGEN SATURATION: 94 %

## 2019-04-23 DIAGNOSIS — Z96.652 HISTORY OF TOTAL LEFT KNEE REPLACEMENT: Primary | ICD-10-CM

## 2019-04-23 PROCEDURE — 2580000003 HC RX 258: Performed by: ORTHOPAEDIC SURGERY

## 2019-04-23 PROCEDURE — G0378 HOSPITAL OBSERVATION PER HR: HCPCS

## 2019-04-23 PROCEDURE — 2500000003 HC RX 250 WO HCPCS: Performed by: NURSE ANESTHETIST, CERTIFIED REGISTERED

## 2019-04-23 PROCEDURE — 3600000015 HC SURGERY LEVEL 5 ADDTL 15MIN: Performed by: ORTHOPAEDIC SURGERY

## 2019-04-23 PROCEDURE — 6360000002 HC RX W HCPCS: Performed by: INTERNAL MEDICINE

## 2019-04-23 PROCEDURE — 27447 TOTAL KNEE ARTHROPLASTY: CPT | Performed by: ORTHOPAEDIC SURGERY

## 2019-04-23 PROCEDURE — 73560 X-RAY EXAM OF KNEE 1 OR 2: CPT

## 2019-04-23 PROCEDURE — 6360000002 HC RX W HCPCS: Performed by: ORTHOPAEDIC SURGERY

## 2019-04-23 PROCEDURE — 7100000001 HC PACU RECOVERY - ADDTL 15 MIN: Performed by: ORTHOPAEDIC SURGERY

## 2019-04-23 PROCEDURE — 6360000002 HC RX W HCPCS: Performed by: NURSE ANESTHETIST, CERTIFIED REGISTERED

## 2019-04-23 PROCEDURE — 6360000002 HC RX W HCPCS: Performed by: ANESTHESIOLOGY

## 2019-04-23 PROCEDURE — 3700000001 HC ADD 15 MINUTES (ANESTHESIA): Performed by: ORTHOPAEDIC SURGERY

## 2019-04-23 PROCEDURE — 3600000005 HC SURGERY LEVEL 5 BASE: Performed by: ORTHOPAEDIC SURGERY

## 2019-04-23 PROCEDURE — 2500000003 HC RX 250 WO HCPCS: Performed by: ORTHOPAEDIC SURGERY

## 2019-04-23 PROCEDURE — C1713 ANCHOR/SCREW BN/BN,TIS/BN: HCPCS | Performed by: ORTHOPAEDIC SURGERY

## 2019-04-23 PROCEDURE — 1200000000 HC SEMI PRIVATE

## 2019-04-23 PROCEDURE — C1776 JOINT DEVICE (IMPLANTABLE): HCPCS | Performed by: ORTHOPAEDIC SURGERY

## 2019-04-23 PROCEDURE — 3700000000 HC ANESTHESIA ATTENDED CARE: Performed by: ORTHOPAEDIC SURGERY

## 2019-04-23 PROCEDURE — 2580000003 HC RX 258

## 2019-04-23 PROCEDURE — 2709999900 HC NON-CHARGEABLE SUPPLY: Performed by: ORTHOPAEDIC SURGERY

## 2019-04-23 PROCEDURE — 94150 VITAL CAPACITY TEST: CPT

## 2019-04-23 PROCEDURE — 7100000000 HC PACU RECOVERY - FIRST 15 MIN: Performed by: ORTHOPAEDIC SURGERY

## 2019-04-23 PROCEDURE — 0SRD0J9 REPLACEMENT OF LEFT KNEE JOINT WITH SYNTHETIC SUBSTITUTE, CEMENTED, OPEN APPROACH: ICD-10-PCS | Performed by: ORTHOPAEDIC SURGERY

## 2019-04-23 PROCEDURE — 6370000000 HC RX 637 (ALT 250 FOR IP): Performed by: ORTHOPAEDIC SURGERY

## 2019-04-23 PROCEDURE — 2720000010 HC SURG SUPPLY STERILE: Performed by: ORTHOPAEDIC SURGERY

## 2019-04-23 DEVICE — COMPONENT ARTC SURF PS 6-9 CD 10 MM LT TIB FIX BEAR: Type: IMPLANTABLE DEVICE | Site: KNEE | Status: FUNCTIONAL

## 2019-04-23 DEVICE — CEMENT BNE 40GM W/ GENT HI VISC RADPQ FOR REV SURG: Type: IMPLANTABLE DEVICE | Site: KNEE | Status: FUNCTIONAL

## 2019-04-23 DEVICE — IMPL KNEE FEM PSN CMT CCR STD SZ6 L: Type: IMPLANTABLE DEVICE | Site: KNEE | Status: FUNCTIONAL

## 2019-04-23 DEVICE — PSN TIB STM 5 DEG SZ D L: Type: IMPLANTABLE DEVICE | Site: KNEE | Status: FUNCTIONAL

## 2019-04-23 DEVICE — COMPONENT PAT DIA29MM THK8MM KNEE POLY CEM CONVENTIONAL: Type: IMPLANTABLE DEVICE | Site: KNEE | Status: FUNCTIONAL

## 2019-04-23 RX ORDER — HYDRALAZINE HYDROCHLORIDE 20 MG/ML
5 INJECTION INTRAMUSCULAR; INTRAVENOUS EVERY 10 MIN PRN
Status: DISCONTINUED | OUTPATIENT
Start: 2019-04-23 | End: 2019-04-23 | Stop reason: HOSPADM

## 2019-04-23 RX ORDER — PROPOFOL 10 MG/ML
INJECTION, EMULSION INTRAVENOUS PRN
Status: DISCONTINUED | OUTPATIENT
Start: 2019-04-23 | End: 2019-04-23 | Stop reason: SDUPTHER

## 2019-04-23 RX ORDER — SODIUM CHLORIDE, SODIUM LACTATE, POTASSIUM CHLORIDE, CALCIUM CHLORIDE 600; 310; 30; 20 MG/100ML; MG/100ML; MG/100ML; MG/100ML
INJECTION, SOLUTION INTRAVENOUS CONTINUOUS
Status: DISCONTINUED | OUTPATIENT
Start: 2019-04-23 | End: 2019-04-23

## 2019-04-23 RX ORDER — FENTANYL CITRATE 50 UG/ML
25 INJECTION, SOLUTION INTRAMUSCULAR; INTRAVENOUS EVERY 5 MIN PRN
Status: DISCONTINUED | OUTPATIENT
Start: 2019-04-23 | End: 2019-04-23 | Stop reason: HOSPADM

## 2019-04-23 RX ORDER — SODIUM CHLORIDE, SODIUM LACTATE, POTASSIUM CHLORIDE, CALCIUM CHLORIDE 600; 310; 30; 20 MG/100ML; MG/100ML; MG/100ML; MG/100ML
INJECTION, SOLUTION INTRAVENOUS
Status: COMPLETED
Start: 2019-04-23 | End: 2019-04-23

## 2019-04-23 RX ORDER — MIDAZOLAM HYDROCHLORIDE 1 MG/ML
INJECTION INTRAMUSCULAR; INTRAVENOUS
Status: DISPENSED
Start: 2019-04-23 | End: 2019-04-23

## 2019-04-23 RX ORDER — OXYCODONE HYDROCHLORIDE AND ACETAMINOPHEN 5; 325 MG/1; MG/1
1 TABLET ORAL EVERY 4 HOURS PRN
Status: DISCONTINUED | OUTPATIENT
Start: 2019-04-23 | End: 2019-04-25 | Stop reason: HOSPADM

## 2019-04-23 RX ORDER — DEXAMETHASONE SODIUM PHOSPHATE 4 MG/ML
INJECTION, SOLUTION INTRA-ARTICULAR; INTRALESIONAL; INTRAMUSCULAR; INTRAVENOUS; SOFT TISSUE PRN
Status: DISCONTINUED | OUTPATIENT
Start: 2019-04-23 | End: 2019-04-23 | Stop reason: SDUPTHER

## 2019-04-23 RX ORDER — SODIUM CHLORIDE 0.9 % (FLUSH) 0.9 %
10 SYRINGE (ML) INJECTION PRN
Status: DISCONTINUED | OUTPATIENT
Start: 2019-04-23 | End: 2019-04-25 | Stop reason: HOSPADM

## 2019-04-23 RX ORDER — HYDROMORPHONE HCL 110MG/55ML
PATIENT CONTROLLED ANALGESIA SYRINGE INTRAVENOUS PRN
Status: DISCONTINUED | OUTPATIENT
Start: 2019-04-23 | End: 2019-04-23 | Stop reason: SDUPTHER

## 2019-04-23 RX ORDER — CEFAZOLIN SODIUM 2 G/100ML
2 INJECTION, SOLUTION INTRAVENOUS
Status: COMPLETED | OUTPATIENT
Start: 2019-04-23 | End: 2019-04-23

## 2019-04-23 RX ORDER — LIDOCAINE HYDROCHLORIDE 20 MG/ML
INJECTION, SOLUTION EPIDURAL; INFILTRATION; INTRACAUDAL; PERINEURAL PRN
Status: DISCONTINUED | OUTPATIENT
Start: 2019-04-23 | End: 2019-04-23 | Stop reason: SDUPTHER

## 2019-04-23 RX ORDER — SODIUM CHLORIDE 0.9 % (FLUSH) 0.9 %
10 SYRINGE (ML) INJECTION EVERY 12 HOURS SCHEDULED
Status: DISCONTINUED | OUTPATIENT
Start: 2019-04-23 | End: 2019-04-25 | Stop reason: HOSPADM

## 2019-04-23 RX ORDER — ACETAMINOPHEN 10 MG/ML
1000 INJECTION, SOLUTION INTRAVENOUS ONCE
Status: COMPLETED | OUTPATIENT
Start: 2019-04-23 | End: 2019-04-23

## 2019-04-23 RX ORDER — ONDANSETRON 2 MG/ML
4 INJECTION INTRAMUSCULAR; INTRAVENOUS
Status: DISCONTINUED | OUTPATIENT
Start: 2019-04-23 | End: 2019-04-23 | Stop reason: HOSPADM

## 2019-04-23 RX ORDER — CEFAZOLIN SODIUM 2 G/100ML
2 INJECTION, SOLUTION INTRAVENOUS ONCE
Status: DISCONTINUED | OUTPATIENT
Start: 2019-04-23 | End: 2019-04-23 | Stop reason: SDUPTHER

## 2019-04-23 RX ORDER — DOCUSATE SODIUM 100 MG/1
100 CAPSULE, LIQUID FILLED ORAL 2 TIMES DAILY
Status: DISCONTINUED | OUTPATIENT
Start: 2019-04-23 | End: 2019-04-25 | Stop reason: HOSPADM

## 2019-04-23 RX ORDER — TRANEXAMIC ACID 100 MG/ML
INJECTION, SOLUTION INTRAVENOUS
Status: COMPLETED | OUTPATIENT
Start: 2019-04-23 | End: 2019-04-23

## 2019-04-23 RX ORDER — 0.9 % SODIUM CHLORIDE 0.9 %
500 INTRAVENOUS SOLUTION INTRAVENOUS
Status: DISCONTINUED | OUTPATIENT
Start: 2019-04-23 | End: 2019-04-23 | Stop reason: HOSPADM

## 2019-04-23 RX ORDER — SODIUM CHLORIDE 0.9 % (FLUSH) 0.9 %
10 SYRINGE (ML) INJECTION PRN
Status: DISCONTINUED | OUTPATIENT
Start: 2019-04-23 | End: 2019-04-23 | Stop reason: HOSPADM

## 2019-04-23 RX ORDER — FENTANYL CITRATE 50 UG/ML
INJECTION, SOLUTION INTRAMUSCULAR; INTRAVENOUS PRN
Status: DISCONTINUED | OUTPATIENT
Start: 2019-04-23 | End: 2019-04-23 | Stop reason: SDUPTHER

## 2019-04-23 RX ORDER — CEFAZOLIN SODIUM 2 G/50ML
2 SOLUTION INTRAVENOUS EVERY 8 HOURS
Status: COMPLETED | OUTPATIENT
Start: 2019-04-23 | End: 2019-04-24

## 2019-04-23 RX ORDER — ONDANSETRON 2 MG/ML
4 INJECTION INTRAMUSCULAR; INTRAVENOUS EVERY 6 HOURS PRN
Status: DISCONTINUED | OUTPATIENT
Start: 2019-04-23 | End: 2019-04-25 | Stop reason: HOSPADM

## 2019-04-23 RX ORDER — SODIUM CHLORIDE, SODIUM LACTATE, POTASSIUM CHLORIDE, CALCIUM CHLORIDE 600; 310; 30; 20 MG/100ML; MG/100ML; MG/100ML; MG/100ML
INJECTION, SOLUTION INTRAVENOUS CONTINUOUS
Status: DISCONTINUED | OUTPATIENT
Start: 2019-04-23 | End: 2019-04-25 | Stop reason: HOSPADM

## 2019-04-23 RX ORDER — KETOROLAC TROMETHAMINE 30 MG/ML
INJECTION, SOLUTION INTRAMUSCULAR; INTRAVENOUS PRN
Status: DISCONTINUED | OUTPATIENT
Start: 2019-04-23 | End: 2019-04-23 | Stop reason: SDUPTHER

## 2019-04-23 RX ORDER — ONDANSETRON 2 MG/ML
INJECTION INTRAMUSCULAR; INTRAVENOUS PRN
Status: DISCONTINUED | OUTPATIENT
Start: 2019-04-23 | End: 2019-04-23 | Stop reason: SDUPTHER

## 2019-04-23 RX ORDER — SODIUM CHLORIDE 0.9 % (FLUSH) 0.9 %
10 SYRINGE (ML) INJECTION EVERY 12 HOURS SCHEDULED
Status: DISCONTINUED | OUTPATIENT
Start: 2019-04-23 | End: 2019-04-23 | Stop reason: HOSPADM

## 2019-04-23 RX ADMIN — HYDROMORPHONE HYDROCHLORIDE 1 MG: 2 INJECTION INTRAMUSCULAR; INTRAVENOUS; SUBCUTANEOUS at 12:05

## 2019-04-23 RX ADMIN — DOCUSATE SODIUM 100 MG: 100 CAPSULE, LIQUID FILLED ORAL at 21:06

## 2019-04-23 RX ADMIN — ACETAMINOPHEN 1000 MG: 10 INJECTION, SOLUTION INTRAVENOUS at 11:42

## 2019-04-23 RX ADMIN — ONDANSETRON 4 MG: 2 INJECTION INTRAMUSCULAR; INTRAVENOUS at 11:44

## 2019-04-23 RX ADMIN — SODIUM CHLORIDE, POTASSIUM CHLORIDE, SODIUM LACTATE AND CALCIUM CHLORIDE: 600; 310; 30; 20 INJECTION, SOLUTION INTRAVENOUS at 08:42

## 2019-04-23 RX ADMIN — HYDROMORPHONE HYDROCHLORIDE 1 MG: 2 INJECTION INTRAMUSCULAR; INTRAVENOUS; SUBCUTANEOUS at 12:11

## 2019-04-23 RX ADMIN — DEXAMETHASONE SODIUM PHOSPHATE 4 MG: 4 INJECTION, SOLUTION INTRAMUSCULAR; INTRAVENOUS at 11:44

## 2019-04-23 RX ADMIN — DEXAMETHASONE SODIUM PHOSPHATE 4 MG: 4 INJECTION, SOLUTION INTRAMUSCULAR; INTRAVENOUS at 11:45

## 2019-04-23 RX ADMIN — FENTANYL CITRATE 100 MCG: 50 INJECTION INTRAMUSCULAR; INTRAVENOUS at 11:37

## 2019-04-23 RX ADMIN — OXYCODONE AND ACETAMINOPHEN 1 TABLET: 5; 325 TABLET ORAL at 23:40

## 2019-04-23 RX ADMIN — KETOROLAC TROMETHAMINE 30 MG: 30 INJECTION, SOLUTION INTRAMUSCULAR; INTRAVENOUS at 13:01

## 2019-04-23 RX ADMIN — SODIUM CHLORIDE, POTASSIUM CHLORIDE, SODIUM LACTATE AND CALCIUM CHLORIDE: 600; 310; 30; 20 INJECTION, SOLUTION INTRAVENOUS at 21:18

## 2019-04-23 RX ADMIN — CEFAZOLIN SODIUM 2 G: 2 INJECTION, SOLUTION INTRAVENOUS at 11:33

## 2019-04-23 RX ADMIN — OXYCODONE AND ACETAMINOPHEN 1 TABLET: 5; 325 TABLET ORAL at 19:37

## 2019-04-23 RX ADMIN — ONDANSETRON 4 MG: 2 INJECTION INTRAMUSCULAR; INTRAVENOUS at 18:46

## 2019-04-23 RX ADMIN — SODIUM CHLORIDE, POTASSIUM CHLORIDE, SODIUM LACTATE AND CALCIUM CHLORIDE: 600; 310; 30; 20 INJECTION, SOLUTION INTRAVENOUS at 11:21

## 2019-04-23 RX ADMIN — CEFAZOLIN SODIUM 2 G: 2 SOLUTION INTRAVENOUS at 17:39

## 2019-04-23 RX ADMIN — LIDOCAINE HYDROCHLORIDE 100 MG: 20 INJECTION, SOLUTION EPIDURAL; INFILTRATION; INTRACAUDAL; PERINEURAL at 11:50

## 2019-04-23 RX ADMIN — FENTANYL CITRATE 100 MCG: 50 INJECTION INTRAMUSCULAR; INTRAVENOUS at 11:49

## 2019-04-23 RX ADMIN — SODIUM CHLORIDE, POTASSIUM CHLORIDE, SODIUM LACTATE AND CALCIUM CHLORIDE: 600; 310; 30; 20 INJECTION, SOLUTION INTRAVENOUS at 14:08

## 2019-04-23 RX ADMIN — PROPOFOL 50 MG: 10 INJECTION, EMULSION INTRAVENOUS at 12:01

## 2019-04-23 RX ADMIN — PROPOFOL 200 MG: 10 INJECTION, EMULSION INTRAVENOUS at 11:44

## 2019-04-23 RX ADMIN — SODIUM CHLORIDE, POTASSIUM CHLORIDE, SODIUM LACTATE AND CALCIUM CHLORIDE: 600; 310; 30; 20 INJECTION, SOLUTION INTRAVENOUS at 23:40

## 2019-04-23 ASSESSMENT — PULMONARY FUNCTION TESTS
PIF_VALUE: 1
PIF_VALUE: 9
PIF_VALUE: 9
PIF_VALUE: 10
PIF_VALUE: 2
PIF_VALUE: 2
PIF_VALUE: 8
PIF_VALUE: 10
PIF_VALUE: 8
PIF_VALUE: 1
PIF_VALUE: 1
PIF_VALUE: 8
PIF_VALUE: 4
PIF_VALUE: 13
PIF_VALUE: 8
PIF_VALUE: 2
PIF_VALUE: 11
PIF_VALUE: 10
PIF_VALUE: 1
PIF_VALUE: 8
PIF_VALUE: 10
PIF_VALUE: 4
PIF_VALUE: 3
PIF_VALUE: 9
PIF_VALUE: 3
PIF_VALUE: 8
PIF_VALUE: 3
PIF_VALUE: 10
PIF_VALUE: 1
PIF_VALUE: 9
PIF_VALUE: 9
PIF_VALUE: 2
PIF_VALUE: 10
PIF_VALUE: 6
PIF_VALUE: 7
PIF_VALUE: 5
PIF_VALUE: 2
PIF_VALUE: 1
PIF_VALUE: 2
PIF_VALUE: 8
PIF_VALUE: 3
PIF_VALUE: 7
PIF_VALUE: 1
PIF_VALUE: 9
PIF_VALUE: 2
PIF_VALUE: 2
PIF_VALUE: 12
PIF_VALUE: 11
PIF_VALUE: 7
PIF_VALUE: 1
PIF_VALUE: 1
PIF_VALUE: 6
PIF_VALUE: 2
PIF_VALUE: 1
PIF_VALUE: 7
PIF_VALUE: 8
PIF_VALUE: 4
PIF_VALUE: 7
PIF_VALUE: 9
PIF_VALUE: 1
PIF_VALUE: 9
PIF_VALUE: 4
PIF_VALUE: 11
PIF_VALUE: 9
PIF_VALUE: 6
PIF_VALUE: 1
PIF_VALUE: 8
PIF_VALUE: 9
PIF_VALUE: 3
PIF_VALUE: 6
PIF_VALUE: 6
PIF_VALUE: 4
PIF_VALUE: 8
PIF_VALUE: 5
PIF_VALUE: 9
PIF_VALUE: 2
PIF_VALUE: 2
PIF_VALUE: 9
PIF_VALUE: 8
PIF_VALUE: 9
PIF_VALUE: 4
PIF_VALUE: 9
PIF_VALUE: 8
PIF_VALUE: 1
PIF_VALUE: 8
PIF_VALUE: 11
PIF_VALUE: 1
PIF_VALUE: 2
PIF_VALUE: 8
PIF_VALUE: 1
PIF_VALUE: 4
PIF_VALUE: 8
PIF_VALUE: 3
PIF_VALUE: 8
PIF_VALUE: 10
PIF_VALUE: 1
PIF_VALUE: 10
PIF_VALUE: 7
PIF_VALUE: 1
PIF_VALUE: 11
PIF_VALUE: 1
PIF_VALUE: 1
PIF_VALUE: 2
PIF_VALUE: 6
PIF_VALUE: 8
PIF_VALUE: 1
PIF_VALUE: 8
PIF_VALUE: 5
PIF_VALUE: 2
PIF_VALUE: 8
PIF_VALUE: 5
PIF_VALUE: 7
PIF_VALUE: 8
PIF_VALUE: 8
PIF_VALUE: 9
PIF_VALUE: 2
PIF_VALUE: 8
PIF_VALUE: 10
PIF_VALUE: 11
PIF_VALUE: 7
PIF_VALUE: 16
PIF_VALUE: 2
PIF_VALUE: 3
PIF_VALUE: 1

## 2019-04-23 ASSESSMENT — PAIN SCALES - GENERAL
PAINLEVEL_OUTOF10: 4
PAINLEVEL_OUTOF10: 4
PAINLEVEL_OUTOF10: 0

## 2019-04-23 ASSESSMENT — PAIN - FUNCTIONAL ASSESSMENT: PAIN_FUNCTIONAL_ASSESSMENT: 0-10

## 2019-04-23 NOTE — H&P
Subjective:      Patient ID: Corby Cline is a 70 y.o. female.     Patient comes in for her Pre op appointment on her left knee.     Louise Browning is a 69 y/o female coming in for her left knee     She states that since I saw her last her symptoms have remained unchanged. She describes the pain as aching, throbbing and grinding globally in her left knee which is worse when going up and down stairs or standing for prolonged period of time. For this she has received multiple cortisone injections in the past which were helping at first but seemed to not be lasting as long anymore. Patient denies any new injury to the involved extremity/ joint, denies numbness or tingling in the involved extremity and denies fever or chills.     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 Respiratory: Negative for chest tightness. Cardiovascular: Negative for chest pain. Musculoskeletal: Positive for arthralgias, gait problem, joint swelling and myalgias. Negative for back pain. Skin: Negative for color change, pallor, rash and wound. Neurological: Negative for weakness and numbness.      Past Medical History:   Diagnosis Date    Arthritis     generalized OA    Fracture of left ankle 02/01/2004    H/O Doppler ultrasound 11/2/14    Venous Doppler. Left lower extremity probably old DVT in the distal SFA and popliteal areas otherwise right lower extremity reveals normal findings.  H/O Doppler ultrasound 09/19/2017    LLE - chronic DVT, severe veous reflux    H/O echocardiogram 09/26/2016    EF60% mild MR, TR, mild pulm htn    H/O echocardiogram 07/18/2018    EF55-60% mild TR    Hx of blood clots     \"hx of PE- 2013 and had DVT left leg that went to my lung\"    Hx of Doppler echocardiogram 12/22/2017    EF 50-60% mild biatrial enlargement, mild to mod TR, mildly elevated pulmonary artery pressure.     Hyperlipidemia bony tenderness and MCL laxity. She exhibits normal range of motion, no effusion, no ecchymosis, no deformity, no laceration, no erythema, normal alignment, no LCL laxity and normal patellar mobility. Tenderness found. Medial joint line, lateral joint line and patellar tendon tenderness noted. Neurological: She is alert and oriented to person, place, and time. She has normal strength. No sensory deficit. Skin: Skin is warm and dry. No rash noted. No erythema. No pallor.      Left knee-Skin intact with no erythema, ecchymosis or lacerations present. Full range of motion.     XRAY  X-ray 3 view of the left knee reviewed by me today in the office demonstrates severe degenerative changes with medial joint space collapse and moderate lateral and patellofemoral joint space narrowing, moderate osteophyte formation in all 3 compartments as well as posterior, normal tracking of the patella, age-appropriate bone density throughout, no acute osseous abnormalities, no bony prominences, no loose bodies.           Assessment:   Left knee DJD, severe                Plan:   I discussed with her again today her x-ray findings. I explained to her that she does have severe arthritis in her left knee. I answered all of her questions today in regards to her upcoming left knee replacement surgery. I had a lengthy discussion with her today in regards to left total knee replacement surgery  I explained risks, benefits, possible complications of the procedure and answered all questions for the patient. The patient understands and consents to the procedure. We will schedule surgery at soonest convenience. I explained postoperative rehabilitation protocol and expectations with the patient today. Patient will follow up with their primary care physician prior to surgical treatment for preoperative clearance. Continue weight-bearing as tolerated. Continue range of motion exercises as instructed.   Ice and elevate as

## 2019-04-23 NOTE — OP NOTE
DATE OF PROCEDURE:  4/23/2019    PREOPERATIVE DIAGNOSIS:  Left knee DJD. POSTOPERATIVE DIAGNOSIS:  Left knee DJD. PROCEDURE:  Left total knee arthroplasty using [employer]  posterior stabilized knee with size 6 femoral component, size D tibial  component, size 29 patellar component and size 10 tibial poly component  with antibiotic-impregnated cement. SURGEON:  Saira Colby DO    ANESTHESIA:  General with regional block. ESTIMATED BLOOD LOSS:  200 mL. TOTAL TOURNIQUET TIME:  10 minutes. FLUIDS:  2000 mL of crystalloids. INDICATIONS FOR PROCEDURE:  The patient is a 78-year-old female with  long-standing history of left knee pain. For this, she underwent  conservative treatment with no relief of her symptoms. X-rays revealed  severe arthritis in the left knee. Given her persistent symptoms  despite conservative treatment and with her x-ray findings, I  recommended surgical treatment. I explained the risks, benefits and  possible complications of the procedure to the patient and after  answering all of her questions, she consented to undergo the above  procedure. REPORT OF PROCEDURE:  The patient was seen and evaluated in the  preoperative holding area where the left lower extremity was signed in  her presence. At this point, care of the patient was turned over to  anesthesia team who performed a regional block to the left lower  extremity. She was then transported back to the operative suite. General  anesthesia was performed and once adequate anesthesia was obtained, the  left lower extremity was prepped and draped in usual sterile fashion. Preoperative antibiotics were administered. At this point, a time-out was performed and all in attendance were in agreement. I exsanguinated the left lower extremity with the use of an Esmarch and  tourniquet was inflated to 250 mmHg.   I then made a standard anterior  midline incision overlying the left knee and carried sharp

## 2019-04-24 LAB
HCT VFR BLD CALC: 35.3 % (ref 37–47)
HEMOGLOBIN: 11 GM/DL (ref 12.5–16)

## 2019-04-24 PROCEDURE — 6370000000 HC RX 637 (ALT 250 FOR IP): Performed by: INTERNAL MEDICINE

## 2019-04-24 PROCEDURE — G0378 HOSPITAL OBSERVATION PER HR: HCPCS

## 2019-04-24 PROCEDURE — 94761 N-INVAS EAR/PLS OXIMETRY MLT: CPT

## 2019-04-24 PROCEDURE — 97110 THERAPEUTIC EXERCISES: CPT

## 2019-04-24 PROCEDURE — 2580000003 HC RX 258: Performed by: ORTHOPAEDIC SURGERY

## 2019-04-24 PROCEDURE — 6370000000 HC RX 637 (ALT 250 FOR IP): Performed by: ORTHOPAEDIC SURGERY

## 2019-04-24 PROCEDURE — 97162 PT EVAL MOD COMPLEX 30 MIN: CPT

## 2019-04-24 PROCEDURE — 85014 HEMATOCRIT: CPT

## 2019-04-24 PROCEDURE — 36415 COLL VENOUS BLD VENIPUNCTURE: CPT

## 2019-04-24 PROCEDURE — 97116 GAIT TRAINING THERAPY: CPT

## 2019-04-24 PROCEDURE — 97535 SELF CARE MNGMENT TRAINING: CPT

## 2019-04-24 PROCEDURE — 97166 OT EVAL MOD COMPLEX 45 MIN: CPT

## 2019-04-24 PROCEDURE — 6360000002 HC RX W HCPCS: Performed by: ORTHOPAEDIC SURGERY

## 2019-04-24 PROCEDURE — 85018 HEMOGLOBIN: CPT

## 2019-04-24 PROCEDURE — 1200000000 HC SEMI PRIVATE

## 2019-04-24 RX ORDER — HYDROCHLOROTHIAZIDE 25 MG/1
25 TABLET ORAL DAILY
Status: DISCONTINUED | OUTPATIENT
Start: 2019-04-24 | End: 2019-04-25 | Stop reason: HOSPADM

## 2019-04-24 RX ORDER — PRAVASTATIN SODIUM 10 MG
10 TABLET ORAL DAILY
Status: DISCONTINUED | OUTPATIENT
Start: 2019-04-24 | End: 2019-04-25 | Stop reason: HOSPADM

## 2019-04-24 RX ORDER — MAGNESIUM GLUCONATE 27 MG(500)
500 TABLET ORAL DAILY
Status: DISCONTINUED | OUTPATIENT
Start: 2019-04-24 | End: 2019-04-24

## 2019-04-24 RX ORDER — FOLIC ACID 1 MG/1
1 TABLET ORAL DAILY
Status: DISCONTINUED | OUTPATIENT
Start: 2019-04-24 | End: 2019-04-25 | Stop reason: HOSPADM

## 2019-04-24 RX ORDER — ASPIRIN 81 MG/1
81 TABLET ORAL DAILY
Status: DISCONTINUED | OUTPATIENT
Start: 2019-04-24 | End: 2019-04-25 | Stop reason: HOSPADM

## 2019-04-24 RX ORDER — LOSARTAN POTASSIUM 100 MG/1
100 TABLET ORAL DAILY
Status: DISCONTINUED | OUTPATIENT
Start: 2019-04-24 | End: 2019-04-25 | Stop reason: HOSPADM

## 2019-04-24 RX ORDER — LOSARTAN POTASSIUM AND HYDROCHLOROTHIAZIDE 25; 100 MG/1; MG/1
1 TABLET ORAL DAILY
Status: DISCONTINUED | OUTPATIENT
Start: 2019-04-24 | End: 2019-04-24 | Stop reason: CLARIF

## 2019-04-24 RX ADMIN — OXYCODONE AND ACETAMINOPHEN 1 TABLET: 5; 325 TABLET ORAL at 21:08

## 2019-04-24 RX ADMIN — CEFAZOLIN SODIUM 2 G: 2 SOLUTION INTRAVENOUS at 00:21

## 2019-04-24 RX ADMIN — DOCUSATE SODIUM 100 MG: 100 CAPSULE, LIQUID FILLED ORAL at 21:08

## 2019-04-24 RX ADMIN — OXYCODONE AND ACETAMINOPHEN 1 TABLET: 5; 325 TABLET ORAL at 07:58

## 2019-04-24 RX ADMIN — SODIUM CHLORIDE, POTASSIUM CHLORIDE, SODIUM LACTATE AND CALCIUM CHLORIDE: 600; 310; 30; 20 INJECTION, SOLUTION INTRAVENOUS at 05:45

## 2019-04-24 RX ADMIN — HYDROCHLOROTHIAZIDE 25 MG: 25 TABLET ORAL at 10:53

## 2019-04-24 RX ADMIN — OXYCODONE AND ACETAMINOPHEN 1 TABLET: 5; 325 TABLET ORAL at 03:55

## 2019-04-24 RX ADMIN — RIVAROXABAN 10 MG: 10 TABLET, FILM COATED ORAL at 05:45

## 2019-04-24 RX ADMIN — ASPIRIN 81 MG: 81 TABLET, COATED ORAL at 10:52

## 2019-04-24 RX ADMIN — OXYCODONE AND ACETAMINOPHEN 1 TABLET: 5; 325 TABLET ORAL at 12:17

## 2019-04-24 RX ADMIN — FOLIC ACID 1 MG: 1 TABLET ORAL at 10:53

## 2019-04-24 RX ADMIN — PRAVASTATIN SODIUM 10 MG: 10 TABLET ORAL at 21:08

## 2019-04-24 RX ADMIN — OXYCODONE AND ACETAMINOPHEN 1 TABLET: 5; 325 TABLET ORAL at 16:44

## 2019-04-24 RX ADMIN — DOCUSATE SODIUM 100 MG: 100 CAPSULE, LIQUID FILLED ORAL at 10:52

## 2019-04-24 RX ADMIN — MAGNESIUM OXIDE TAB 400 MG (241.3 MG ELEMENTAL MG) 200 MG: 400 (241.3 MG) TAB at 14:29

## 2019-04-24 RX ADMIN — LOSARTAN POTASSIUM 100 MG: 100 TABLET, FILM COATED ORAL at 10:53

## 2019-04-24 ASSESSMENT — PAIN SCALES - GENERAL
PAINLEVEL_OUTOF10: 4
PAINLEVEL_OUTOF10: 3
PAINLEVEL_OUTOF10: 4
PAINLEVEL_OUTOF10: 2

## 2019-04-24 NOTE — CARE COORDINATION
Spoke with pt in PAT/[facility]. Pt lives with her , she is independent with ADL's and transportation. Pt has a [employer] but no other DME. We discussed discharge options of home, home with HC, Home with outpt therapy, SNU/Swing bed and ARU. Pt is hoping to going home with [facility].. Gave her PPO SNU and HC list.  Pt will need to go with [facility] for her best coverage with insurance.  card provided to pt/family.

## 2019-04-24 NOTE — CARE COORDINATION
Met c pt to initiate discharge planning. Pt lives at home with spouse and plans to return home with home care. Pt has no pref for [facility] provider/ only in network provider is [facility] she is agreeable to this. Sent PS to [facility] with [facility] to follow. Pt also needs RW/ obtain order and documentation and sent to [facility] to be delivered tomorrow.

## 2019-04-24 NOTE — ANESTHESIA POSTPROCEDURE EVALUATION
Department of Anesthesiology  Postprocedure Note    Patient: Ephraim Stevens  MRN: [id number]  YOB: 1950  Date of evaluation: 4/24/2019  Time:  8:18 AM     Procedure Summary     Date:  04/23/19 Room / [facility]  [place] [address] [facility]    Anesthesia Start:  7159 Anesthesia Stop:  7356    Procedure:  KNEE TOTAL ARTHROPLASTY LEFT (Left Knee) Diagnosis:  (primary osteoarthritis left knee)    Surgeon:  Gabriela Simpson DO Responsible Provider:  Laura Jerez MD    Anesthesia Type:  MAC, regional, spinal ASA Status:  3          Anesthesia Type: MAC, regional, spinal    Beth Phase I: Beth Score: 9    Beth Phase II:      Last vitals: Reviewed and per EMR flowsheets.        Anesthesia Post Evaluation    Patient location during evaluation: PACU  Patient participation: complete - patient participated  Level of consciousness: awake  Airway patency: patent  Nausea & Vomiting: no vomiting and no nausea  Complications: no  Cardiovascular status: blood pressure returned to baseline  Respiratory status: acceptable  Hydration status: euvolemic

## 2019-04-25 VITALS
HEART RATE: 101 BPM | SYSTOLIC BLOOD PRESSURE: 137 MMHG | OXYGEN SATURATION: 93 % | WEIGHT: 240 LBS | TEMPERATURE: 98.2 F | DIASTOLIC BLOOD PRESSURE: 62 MMHG | RESPIRATION RATE: 16 BRPM | HEIGHT: 64 IN | BODY MASS INDEX: 40.97 KG/M2

## 2019-04-25 PROCEDURE — 6370000000 HC RX 637 (ALT 250 FOR IP): Performed by: ORTHOPAEDIC SURGERY

## 2019-04-25 PROCEDURE — 94150 VITAL CAPACITY TEST: CPT

## 2019-04-25 PROCEDURE — G0378 HOSPITAL OBSERVATION PER HR: HCPCS

## 2019-04-25 PROCEDURE — 97530 THERAPEUTIC ACTIVITIES: CPT

## 2019-04-25 PROCEDURE — 97116 GAIT TRAINING THERAPY: CPT

## 2019-04-25 PROCEDURE — 94761 N-INVAS EAR/PLS OXIMETRY MLT: CPT

## 2019-04-25 PROCEDURE — 97110 THERAPEUTIC EXERCISES: CPT

## 2019-04-25 PROCEDURE — 6370000000 HC RX 637 (ALT 250 FOR IP): Performed by: INTERNAL MEDICINE

## 2019-04-25 RX ORDER — OXYCODONE HYDROCHLORIDE AND ACETAMINOPHEN 5; 325 MG/1; MG/1
1 TABLET ORAL EVERY 6 HOURS PRN
Qty: 25 TABLET | Refills: 0 | Status: SHIPPED | OUTPATIENT
Start: 2019-04-25 | End: 2019-05-02

## 2019-04-25 RX ORDER — MELOXICAM 7.5 MG/1
7.5 TABLET ORAL DAILY
Status: DISCONTINUED | OUTPATIENT
Start: 2019-04-25 | End: 2019-04-25 | Stop reason: HOSPADM

## 2019-04-25 RX ADMIN — HYDROCHLOROTHIAZIDE 25 MG: 25 TABLET ORAL at 09:57

## 2019-04-25 RX ADMIN — FOLIC ACID 1 MG: 1 TABLET ORAL at 09:57

## 2019-04-25 RX ADMIN — OXYCODONE AND ACETAMINOPHEN 1 TABLET: 5; 325 TABLET ORAL at 14:26

## 2019-04-25 RX ADMIN — OXYCODONE AND ACETAMINOPHEN 1 TABLET: 5; 325 TABLET ORAL at 01:08

## 2019-04-25 RX ADMIN — LOSARTAN POTASSIUM 100 MG: 100 TABLET, FILM COATED ORAL at 09:57

## 2019-04-25 RX ADMIN — OXYCODONE AND ACETAMINOPHEN 1 TABLET: 5; 325 TABLET ORAL at 05:16

## 2019-04-25 RX ADMIN — DOCUSATE SODIUM 100 MG: 100 CAPSULE, LIQUID FILLED ORAL at 09:57

## 2019-04-25 RX ADMIN — OXYCODONE AND ACETAMINOPHEN 1 TABLET: 5; 325 TABLET ORAL at 09:57

## 2019-04-25 RX ADMIN — ASPIRIN 81 MG: 81 TABLET, COATED ORAL at 09:57

## 2019-04-25 RX ADMIN — MELOXICAM 7.5 MG: 7.5 TABLET ORAL at 12:38

## 2019-04-25 ASSESSMENT — PAIN SCALES - GENERAL
PAINLEVEL_OUTOF10: 5
PAINLEVEL_OUTOF10: 4
PAINLEVEL_OUTOF10: 1
PAINLEVEL_OUTOF10: 5
PAINLEVEL_OUTOF10: 3

## 2019-04-25 NOTE — PROGRESS NOTES
Physical Therapy  [facility] ACUTE CARE PHYSICAL THERAPY EVALUATION  Alie Enriquez, 1950, 4029/4029-A, 4/24/2019    History  [place]:  There were no encounter diagnoses. Patient  has a past medical history of Arthritis, Fracture of left ankle, H/O Doppler ultrasound, H/O Doppler ultrasound, H/O echocardiogram, H/O echocardiogram, Hx of blood clots, Hx of Doppler echocardiogram, Hyperlipidemia, Hypertension, Left leg swelling, Other chest pain, Spinal stenosis, and Wears glasses. Patient  has a past surgical history that includes Ankle surgery (Left, 02/2004); Dilation and curettage of uterus (May 2012); Colonoscopy (2010?); Tonsillectomy; and [place] tooth extraction. Subjective:  Patient states: \"Karey been up 3x to the [facility]\"    Pain:  3/10 LLE  Communication with other providers:  Handoff to RN, co-eval with OT. Restrictions: general precautions, fall risk, WBAT LLE, IV, SCDs   Home Setup/Prior level of function  Social/Functional History  Lives With: Spouse  Type of Home: House  Home Layout: Two level, Bed/Bath upstairs  Home Access: Stairs to enter with rails  Entrance Stairs - Number of Steps: 3  Entrance Stairs - Rails: Left  Bathroom Shower/Tub: Tub/Shower unit, Walk-in shower(uses walk-in shower)  Bathroom Toilet: Standard  Bathroom Accessibility: Accessible  Home Equipment: Cane, Standard walker  ADL Assistance: Independent  Homemaking Assistance: Independent  Homemaking Responsibilities: Yes  Ambulation Assistance: Independent  Transfer Assistance: Independent  Active : Yes  Occupation: Full time employment  Type of occupation: [address]: Used to golf, Spending time with 5 grandchildren    Examination of body systems (includes body structures/functions, activity/participation limitations):  · Observation:  Supine in bed upon arrival. Spouse visiting.    · Vision:  VULCUN with glasses for reading close   · [facility]   · Cardiopulmonary:  Stable on room air
-S1/S2 auscultated. Regular rate without appreciable murmurs, rubs, or gallops. Peripheral pulses equal bilaterally and palpable. No peripheral edema. GI Abdomen is soft without significant tenderness, masses, or guarding. Bowel sounds are normoactive. Rectal exam deferred.  Fitzgerald catheter is not present. MSK Left knee dressed- Spontaneous movement of all extremities  SKIN Normal coloration, warm, dry. NEURO Cranial nerves appear grossly intact, normal speech, no lateralizing weakness. PSYCH Awake, alert, oriented x 4. Affect appropriate.     Medications:   Medications:    folic acid  1 mg Oral Daily    pravastatin  10 mg Oral Daily    aspirin  81 mg Oral Daily    losartan  100 mg Oral Daily    And    hydrochlorothiazide  25 mg Oral Daily    magnesium oxide  200 mg Oral Every Other Day    sodium chloride flush  10 mL Intravenous 2 times per day    docusate sodium  100 mg Oral BID    rivaroxaban  10 mg Oral Daily      Infusions:    lactated ringers 125 mL/hr at 04/24/19 0545     PRN Meds:   sodium chloride flush 10 mL PRN   oxyCODONE-acetaminophen 1 tablet Q4H PRN   ondansetron 4 mg Q6H PRN         Electronically signed by Abdifatah Witt MD on 4/24/2019 at 12:39 PM
Shower/Tub: Tub/Shower unit, Walk-in shower(uses walk-in shower)  Bathroom Toilet: Standard  Bathroom Accessibility: Accessible  Home Equipment: [employer], Standard walker  ADL Assistance: [address]: Independent  Homemaking Responsibilities: Yes  Ambulation Assistance: Independent  Transfer Assistance: Independent  Active : Yes  Occupation: Full time employment  Type of occupation: [address]: Used to golf, Spending time with 5 grandchildren  Short term goals  Time Frame for Short term goals: 1 week   Short term goal 1: Pt will perform sit><supine indep   Short term goal 2: Pt will transfer Fernandez   Short term goal 3: Pt will ambulate 150ft with RW Fernandez   Short term goal 4: Pt will ascend/descend 3 steps, single rail SBA        Electronically signed by:    Adán Pratt PTA  4/25/2019, 8:19 AM
menstrual period 01/16/2010, SpO2 93 %, not currently breastfeeding. Subjective   Patient seen and examined today for resting in chair comfortably, up walking with physical therapy doing well, pain controlled and not having much pain today. Ready to go home    Objective:  Vital signs: (most recent): Blood pressure 137/62, pulse 101, temperature 98.2 °F (36.8 °C), temperature source Oral, resp. rate 16, height 5' 4\" (1.626 m), weight 240 lb (108.9 kg), last menstrual period 01/16/2010, SpO2 93 %, not currently breastfeeding. LLE - Dressing removed, incision clean, dry, intact, no calf TTP, compartments soft, NVID  ROM 5-85    Assessment & Plan  POD #2 L TKA, Doing well postoperatively    Continue weight bearing as tolerated.   Continue range of motion exercises  Pain control  DVT prophylaxis  Continue PT/OT  Discharge home today    Mireille Rai, DO  4/25/2019
rate without appreciable murmurs, rubs, or gallops. Peripheral pulses equal bilaterally and palpable. No peripheral edema. GI Abdomen is soft without significant tenderness, masses, or guarding. Bowel sounds are normoactive. Rectal exam deferred.  Fitzgerald catheter is not present. MSK Left knee dressed- Spontaneous movement of all extremities  SKIN Normal coloration, warm, dry. NEURO Cranial nerves appear grossly intact, normal speech, no lateralizing weakness. PSYCH Awake, alert, oriented x 4. Affect appropriate.     Medications:   Medications:    folic acid  1 mg Oral Daily    pravastatin  10 mg Oral Daily    aspirin  81 mg Oral Daily    losartan  100 mg Oral Daily    And    hydrochlorothiazide  25 mg Oral Daily    magnesium oxide  200 mg Oral Every Other Day    sodium chloride flush  10 mL Intravenous 2 times per day    docusate sodium  100 mg Oral BID    rivaroxaban  10 mg Oral Daily      Infusions:    lactated ringers 125 mL/hr at 04/24/19 0545     PRN Meds:     sodium chloride flush 10 mL PRN   oxyCODONE-acetaminophen 1 tablet Q4H PRN   ondansetron 4 mg Q6H PRN         Electronically signed by Renée Dumont MD on 4/25/2019 at 11:20 AM
included UB/LB dressing tasks, toileting, hand hygiene, oral hygiene, and grooming      Safety Measures: Gait belt used, Left in Chair, Alarm in place    Assessment:  Pt is a 70year old female with a past medical history of Arthritis, Fracture of left ankle, H/O Doppler ultrasound, H/O Doppler ultrasound, H/O echocardiogram, H/O echocardiogram, Hx of blood clots, Hx of Doppler echocardiogram, Hyperlipidemia, Hypertension, Left leg swelling, Other chest pain, Spinal stenosis, and Wears glasses. Pt admitted for an elective Lt TKA on 4-23. Pt lives at home with her spouse and at baseline is fully independent with ADLs, IADLs, mobility, driving, and works full time. Pt performed well this date and from a self-care and mobility standpoint is safe to return home with initial 24 hour supervision and [facility] OT services recommended. Recommend a rolling walker and a shower chair. Complexity: Moderate  Prognosis: Good  Plan: 2x/week      Goals:  1. Pt will complete all aspects of bed mobility for EOB/OOB ADLs independently/HOB flat  2. Pt will complete UB/LB bathing independently  3. Pt will complete all aspects of LB dressing independently   4. Pt will complete all functional transfers to and from bed, chair, toilet, shower chair mod I with use of grab bars PRN  5. Pt will ambulate HH distance to bathroom for toileting mod I with RW  6. Pt will complete all aspects of toileting task independently  7.  Pt will complete ther ex/ther act with focus on UE strengthening with black Theraband and standing tolerance >10 minutes for ADL/IADL tasks      Time:   Time in: 907  Time out: 936  Timed treatment minutes: 14  Total time: 29      Electronically signed by:    Sebastian Polanco OT/L, [place], [id number]

## 2019-04-26 ENCOUNTER — CARE COORDINATION (OUTPATIENT)
Dept: CASE MANAGEMENT | Age: 69
End: 2019-04-26

## 2019-04-26 DIAGNOSIS — Z96.652 HISTORY OF TOTAL LEFT KNEE REPLACEMENT: Primary | ICD-10-CM

## 2019-04-26 NOTE — CARE COORDINATION
s/s of infection to report to MD.    Patient reports that she agreed to services per [facility]. Has not been contacted at this point. Agreeable to CTC follow up. Instructed patient on the benefits of [address] as a 24/7 resource for any change in condition or worsening symptoms. Patient voiced understanding. Patient confirms hospital follow up 4/30. Denies any issue with transportation. Patient denies any questions, equipment or resource needs. Agreeable to continued Care Transition. Confirmed CTC contact information. Encouraged call back if needs arise. Spoke with [facility]/ [facility]. Confirmed services with plan for [address] follow up.    Future Appointments   Date Time Provider Rose Camacho   4/30/2019 10:15 AM Anthony Campos PA-C [facility]   4/30/2019  1:30 PM MARIANA Roach - CNP [facility] WALK IN [facility]       Milka Sosa RN

## 2019-04-30 ENCOUNTER — OFFICE VISIT (OUTPATIENT)
Dept: ORTHOPEDIC SURGERY | Age: 69
End: 2019-04-30

## 2019-04-30 VITALS — BODY MASS INDEX: 40.12 KG/M2 | WEIGHT: 235 LBS | RESPIRATION RATE: 14 BRPM | HEIGHT: 64 IN

## 2019-04-30 DIAGNOSIS — Z96.652 STATUS POST TOTAL LEFT KNEE REPLACEMENT: Primary | ICD-10-CM

## 2019-04-30 PROCEDURE — 99024 POSTOP FOLLOW-UP VISIT: CPT | Performed by: PHYSICIAN ASSISTANT

## 2019-04-30 NOTE — PATIENT INSTRUCTIONS
continue the PT protocol  rest/ice/elevation as needed  Continue weight bearing with walker as tolerated   Follow up in 2 weeks with dr Meredith Carpenter for staple removal and x-rays

## 2019-04-30 NOTE — PROGRESS NOTES
I reviewed and agree with the portions of the HPI, review of systems, vital documentation and plan performed by my staff and have added/addended where appropriate. Williams Bermudez  [id number]  April 30, 2019    Chief Complaint   Patient presents with    Post-Op Check     left total knee arthroplasty       Date of surgery:   April 23rd 2019    History:  Ms. Williams Bermudez is here in follow up regarding her left knee:  Left total knee arthroplasty   She is doing well. She is having 1/10 pain. She has not discontinued the assistive devices for ambulation. She denies chest pain, SOB, calf pain. No other issues. She is doing in home PT. Physical:  Vitals:    04/30/19 1015   Resp: 14   Weight: 235 lb (106.6 kg)   Height: 5' 4\" (1.626 m)        She demonstrates appropriate mood and affect. The left leg exam:  The incision is clean, dry and intact. She has moderate swelling. There is no calf tenderness. No significant calf/ankle edema. She is neurovascularly intact distally. Active knee extension and active knee flexion. Range of motion 0-85°.     Impression: Status post left total knee arthroplasty     Plan:   continue the PT protocol  rest/ice/elevation as needed  Continue weight bearing with walker as tolerated   Follow up in 2 weeks with dr Yossi Daly for staple removal and x-rays

## 2019-05-01 ENCOUNTER — CARE COORDINATION (OUTPATIENT)
Dept: CASE MANAGEMENT | Age: 69
End: 2019-05-01

## 2019-05-01 NOTE — CARE COORDINATION
[facility] Transitions Follow Up Call    2019    Patient: Vanesa Shetty  Patient : 1950   MRN: [id number]  Reason for Admission: Left knee DJD  S/p left total knee arthroplasty on 2019. Discharge Date: 19 RARS: Readmission Risk Score: 5    Spoke with: Spouse    Care Transitions Subsequent and Final Call    Subsequent and Final Calls  Are you currently active with any services?:  Home Health  Care Transitions Interventions  Other Interventions: Follow Up:  Spoke w/  Spouse for CT call. Reports patient currently working w/ [address] [place] [address] Rd PT. Reports patient \"doing very well\". Reports 2 days ago small amount of blood tinged drainage from left knee incision site. Patient was seen by [facility] yesterday; per his note 'incision is clean, dry and intact'. Spouse denies drainage today. Reports site dry, no edema, no erythema, no fever. Denies calf tenderness/swelling, denies sob. Reports post-op pain easily and well controlled w/  Percocet as needed. + BM. Discussed sx that need to be reported to [facility]/Surgeon asap; voices understanding,    Reports patient is weight bearing as tolerated per MD direction and using walker. States [facility] PT plans to transition to cane today. Reports current level of [facility] services meeting patient's healthcare needs. Confirmed f/u appt 19 11:10 Dr Justin Wild for staple removal; Spouse to provide transportation. Denies resource needs. Agreeable to continued CT f/u. Reinforced availability to call [address] [place] [address] Rd  re: questions, concerns, change in condition. Agreeable and has number.      Future Appointments   Date Time Provider Rose Camacho   2019 11:10 AM Lily Phelps, DO [address] RN

## 2019-05-08 ENCOUNTER — OFFICE VISIT (OUTPATIENT)
Dept: ORTHOPEDIC SURGERY | Age: 69
End: 2019-05-08

## 2019-05-08 ENCOUNTER — CARE COORDINATION (OUTPATIENT)
Dept: CASE MANAGEMENT | Age: 69
End: 2019-05-08

## 2019-05-08 DIAGNOSIS — Z96.652 STATUS POST TOTAL LEFT KNEE REPLACEMENT: Primary | ICD-10-CM

## 2019-05-08 PROCEDURE — 99024 POSTOP FOLLOW-UP VISIT: CPT | Performed by: ORTHOPAEDIC SURGERY

## 2019-05-08 NOTE — CARE COORDINATION
[facility] Transitions Follow Up Call    2019    Patient: Mirna Garay  Patient : 1950   MRN: [id number]  Reason for Admission: Left Knee DJD s/p left total knee arthroplasty on 2019  Discharge Date: 19 RARS: Readmission Risk Score: 5    Spoke with: Patient    Care Transitions Subsequent and Final Call    Schedule Follow Up Appointment with PCP:  Completed  Subsequent and Final Calls  Do you have any ongoing symptoms?:  No  Have your medications changed?:  No  Do you have any questions related to your medications?:  No  Do you currently have any active services?:  Yes  Are you currently active with any services?:  Home Health  Do you have any needs or concerns that I can assist you with?:  No  Identified Barriers:  None  Care Transitions Interventions  No Identified Needs  Other Interventions: Follow Up: Spoke w/ patient for Care Transition. Reports doing well. Reports incision line intact, free of erythema, drainage. Reports mild knee incision at hs easily controlled w/ 2 prn Percocet. Continues w/ [address] for PT with visit today. Reports she has transitioned from walker > cane and working on ambulation w/o assistive devise. Reports current level of services meeting healthcare needs. Has appt today 3:50pm Dr Crenshaw Friend for staple removal; has transportation. Denies resource needs. Agreeable to continued CT w/ goal of next call to be final CT outreach pending patient doing well and w/o needs. Encouraged to contact CTC should needs arise prior to next call.    Future Appointments   Date Time Provider Rose Camacho   2019  3:50 PM Ct Pa DO [facility]   2019  3:30 PM Bryan Smith, PT [facility] PT [place] None       Rock Jean Baptiste, RN

## 2019-05-12 ASSESSMENT — ENCOUNTER SYMPTOMS
BACK PAIN: 0
CHEST TIGHTNESS: 0
COLOR CHANGE: 0

## 2019-05-12 NOTE — PROGRESS NOTES
Patient returns today for 2 week post op check. She states that she is doing well post op and her pain level is 1/10. She has been doing therapy post op and is transitioning from home PT to outpatient therapy in 1 week. Surgery completed 423/19.
bony tenderness and MCL laxity. She exhibits normal range of motion, no effusion, no ecchymosis, no deformity, no laceration, no erythema, normal alignment, no LCL laxity and normal patellar mobility. Tenderness found. Medial joint line, lateral joint line and patellar tendon tenderness noted. Neurological: She is alert and oriented to person, place, and time. She has normal strength. No sensory deficit. Skin: Skin is warm and dry. No rash noted. No erythema. No pallor. Left knee-Incision clean, dry, intact, with no erythema, no drainage, and no signs of infection. June present. 5-95°            Assessment:      Left TKA, 2 weeks      Plan:      I discussed with her today that she is progressing very well. I discussed with the patient today that their are symptoms are normal and should improve with time. Continue physical therapy. Continue weight-bearing as tolerated. Continue range of motion exercises as instructed. Ice and elevate as needed. Tylenol or Motrin for pain.   Follow up in 1 week for staple removal.        Mireille 97, DO

## 2019-05-13 ENCOUNTER — OFFICE VISIT (OUTPATIENT)
Dept: ORTHOPEDIC SURGERY | Age: 69
End: 2019-05-13

## 2019-05-13 VITALS — HEIGHT: 64 IN | WEIGHT: 235 LBS | RESPIRATION RATE: 14 BRPM | BODY MASS INDEX: 40.12 KG/M2

## 2019-05-13 DIAGNOSIS — Z96.652 STATUS POST TOTAL LEFT KNEE REPLACEMENT: Primary | ICD-10-CM

## 2019-05-13 PROCEDURE — 99024 POSTOP FOLLOW-UP VISIT: CPT | Performed by: ORTHOPAEDIC SURGERY

## 2019-05-13 ASSESSMENT — ENCOUNTER SYMPTOMS
COLOR CHANGE: 0
BACK PAIN: 0
CHEST TIGHTNESS: 0

## 2019-05-13 NOTE — PROGRESS NOTES
alignment, no LCL laxity and normal patellar mobility. Tenderness found. Medial joint line, lateral joint line and patellar tendon tenderness noted. Neurological: She is alert and oriented to person, place, and time. She has normal strength. No sensory deficit. Skin: Skin is warm and dry. No rash noted. No erythema. No pallor. Left knee-Incision clean, dry, intact, with no erythema, no drainage, and no signs of infection. Staples present and removed today in the office  0-95°            Assessment:      Left TKA, 3 weeks      Plan:      I discussed with her today that she is continuing to progress very well. I discussed with the patient today that their are symptoms are normal and should improve with time. I discussed with the patient today antibiotic prophylaxis for procedures. Continue physical therapy. Continue weight-bearing as tolerated. Continue range of motion exercises as instructed. Ice and elevate as needed. Tylenol or Motrin for pain. Follow up in 3 weeks for recheck with x-rays of the left knee.           Mireille 97, DO

## 2019-05-15 ENCOUNTER — CARE COORDINATION (OUTPATIENT)
Dept: CASE MANAGEMENT | Age: 69
End: 2019-05-15

## 2019-05-15 NOTE — CARE COORDINATION
[facility] Transitions Follow Up Call    5/15/2019    Patient: Samuel Martinez : 1950   MRN: [id number]  Reason for Admission: Left Knee DJD s/p left total knee arthroplasty on 2019  Discharge Date: 19 RARS: Readmission Risk Score: 5    Spoke with: Patient    Care Transitions Subsequent and Final Call    Schedule Follow Up Appointment with PCP:  Completed  Subsequent and Final Calls  Do you have any ongoing symptoms?:  No  Have your medications changed?:  No  Do you have any questions related to your medications?:  No  Do you currently have any active services?:  Yes  Are you currently active with any services?:  Home Health  Do you have any needs or concerns that I can assist you with?:  No  Identified Barriers:  None  Care Transitions Interventions  No Identified Needs  Other Interventions: Follow Up: Spoke w/ patient for Care Transition. Reports doing well. Reports incision intact, staples removed 19; no erythema, edema, pain, drainage. Reports ambulating independently, rarely using cane. Encouraged to continue ROM exercises as directed. Discharged from [address] 19. Reports taking meds as directed, no questions, no issues w/ cost of rx copay. Reviewed f/u appt below and has transportation. Denies resource needs. Denies need for further CT, agreeable to final call. Encouraged to contact Dr Kelly Kelley re: health concerns, change in condition.        Future Appointments   Date Time Provider Rose Camacho   2019  3:30 PM Morris Jiménez, PT [facility] PT [place] None   6/3/2019  2:30 PM David Sky DO [facility] SRAVAN Deluna RN

## 2019-05-16 ENCOUNTER — HOSPITAL ENCOUNTER (OUTPATIENT)
Dept: PHYSICAL THERAPY | Age: 69
Setting detail: THERAPIES SERIES
Discharge: HOME | End: 2019-05-16
Payer: COMMERCIAL

## 2019-05-16 DIAGNOSIS — Z96.652 STATUS POST TOTAL LEFT KNEE REPLACEMENT: Primary | ICD-10-CM

## 2019-05-16 PROCEDURE — 97016 VASOPNEUMATIC DEVICE THERAPY: CPT

## 2019-05-16 PROCEDURE — 97110 THERAPEUTIC EXERCISES: CPT

## 2019-05-16 PROCEDURE — 97162 PT EVAL MOD COMPLEX 30 MIN: CPT

## 2019-05-16 NOTE — FLOWSHEET NOTE
Outpatient Physical Therapy  [place]           [x] Phone: [phone]   Fax: [phone]  [place] min           [] Phone: [phone]   Fax: [phone]        Physical Therapy Daily Treatment Note  Date:  2019    Patient Name:  Jim Bledsoe    :  1950  MRN: [id number]  Restrictions/Precautions:  none  Diagnosis:   Diagnosis: left TKR    Date of Injury/Surgery:   19  Treatment Diagnosis: Treatment Diagnosis: left TKR    Insurance/Certification information: PT Insurance Information: [employer] [place]   Referring Physician:  Referring Practitioner: Dr. Darrel Moon  Next Doctor Visit:    Plan of care signed (Y/N):    Outcome Measure: KOOS 19  Visit# / total visits: 1/10   Pain level: 0 to 4/10   Goals:          Long term goals  Time Frame for Long term goals : 60 days  Long term goal 1: indep with home program  Long term goal 2: walk without a limp  Long term goal 3: 0 to 115* active left knee motion   Long term goal 4: imporve KOOS by 5 point    Summary of Evaluation: Assessment: Pt presents with - 15 degrees extension and 100* flexion , mild limp, weakness of quads, edema of knee and 0 to 4/10 pain. Today she started with range of motion and strengthening exs , kinesiotape and vasocompression to decrease edeam and pain. Pt is doing very well        Subjective:  See eval         Any changes in Ambulatory Summary Sheet?   None        Objective:  See eval           Exercises: (No more than 4 columns)   Exercise/Equipment Date  19 Date Date           WARM UP         Nu step Seat /arms 9  Level 3  6 min           TABLE      Sitting knee flex      SAQ 3x 10 med bolster     Heel on bolster  2 min     SLR        Sitting with heel on floor stretching hamstrings  4x 20 sec     STANDING      Gait training  Working on knee extension  Pt is walking well without any device     balance                  kinesiotape to reduce edema  2 \"octupus type tapes to decrease edema   Purpose and wearing schedule reviewed     VASo compression 12 min  Became very cold, did use a sheet to keep her warmer                                                     MODALITIES                      Other Therapeutic Activities/Education:        Home Exercise Program:  Written above      Manual Treatments:        Modalities:        Communication with other providers:        Assessment:  (Response towards treatment session) (Pain Rating)    Assessment: Pt presents with - 15 degrees extension and 100* flexion , mild limp, weakness of quads, edema of knee and 0 to 4/10 pain. Today she started with range of motion and strengthening exs , kinesiotape and vasocompression to decrease edeam and pain.   Pt is doing very well      Plan for Next Session:  advance exs      Time In / Time Out:     0031 to 1630         Timed Code/Total Treatment Minutes:  15 min TE,  15 min vaso / 60 min total plus eval      Next Progress Note due:  10      Plan of Care Interventions:  [x] Therapeutic Exercise  [] Modalities:  [x] Therapeutic Activity     [] Ultrasound  [] Estim  [x] Gait Training      [] Cervical Traction [] Lumbar Traction  [x] Neuromuscular Re-education    [] Cold/hotpack [] Iontophoresis   [x] Instruction in HEP      [x] Vasopneumatic   [] Dry Needling    [] Manual Therapy               [] Aquatic Therapy              Electronically signed by:  Raiza Alan, 5/16/2019, 5:21 PM

## 2019-05-16 NOTE — PLAN OF CARE
Good [] Fair  [] Poor     Goals:     Long term goals  Time Frame for Long term goals : 60 days  Long term goal 1: indep with home program  Long term goal 2: walk without a limp  Long term goal 3: 0 to 115* active left knee motion   Long term goal 4: imporve KOOS by 5 point    Electronically signed by:  Liliana Kessler PT, 5/16/2019, 5:20 PM          If you have any questions or concerns, please don't hesitate to call.   Thank you for your referral.    Physician Signature:_________________Date:____________Time: ________  By signing above, therapists plan is approved by physician

## 2019-05-16 NOTE — PROGRESS NOTES
Physical Therapy  Initial Assessment  Date: 2019  Patient Name: Williams Bermudez  MRN: [id number]  : 1950     Treatment Diagnosis: left TKR  Restrictions: none   Subjective   General  Referring Practitioner: Dr. Nely Owen  Diagnosis: left TKR    PT Visit Information  Onset Date: (19)  PT Insurance Information: Medical [place]    ate of onset: 19   Date of surgery: 19   Diagnostic Tests completed:     Yes:        Previous Treatment for same issue? 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 Yes   Prior level of function: indep with ADLs  Social Hx/ Living situation\"  Lives with  2 story , uses railing  Complicating Diagnosis/ factors that contribute to  Severity: none    Chief complaint:   Pt reports Left TKR has done well with home care PT. Pt wants to improve her gait , less pain, and full motion. Pt states she can not lie in bed comfortably  Orientation:  A&Ox3      PainRatin to 4/10  Primary location: knee  Type:    Aching           Stiffness             Localized       Frequency:   Intermittent         Depends on activity  What increases pain: too much walking/ standing,  What decreases pain: elevating leg, rest    Sensation     Complaint of numbness or tingling?:         no         AROM     Knee Flexion:100*           Knee Extension:  Minus 15* Extensor lag:   Position measured in:   Supine        Strength testing  Weak quads  4-/5    Moderate firm edema       Special Testing:   No special testing required secondary to surgery    Able to do full SLR? yes  Able to do SAQ?  yes  Leg Length:   Equal       Scar/Wound appearance:   Appears to be healing well with no signs of erythema  Tape in place     Gait:   Pt walks indep with cane with mild antalgic gait lacking full knee ext    Barriers to Learning: No barriers noted              Preffered learning style:   Demonstration  Written form   Verbal instruction   All

## 2019-05-20 ENCOUNTER — HOSPITAL ENCOUNTER (OUTPATIENT)
Dept: PHYSICAL THERAPY | Age: 69
Setting detail: THERAPIES SERIES
Discharge: HOME | End: 2019-05-20
Payer: COMMERCIAL

## 2019-05-20 PROCEDURE — 97016 VASOPNEUMATIC DEVICE THERAPY: CPT

## 2019-05-20 PROCEDURE — 97110 THERAPEUTIC EXERCISES: CPT

## 2019-05-20 NOTE — FLOWSHEET NOTE
Outpatient Physical Therapy  [facility]           [x] Phone: [phone]   Fax: [phone]  [place]           [] Phone: [phone]   Fax: [phone]        Physical Therapy Daily Treatment Note  Date:  2019    Patient Name:  Deja Rodrigues    :  1950  MRN: [id number]  Restrictions/Precautions:  none  Diagnosis:   Diagnosis: left TKR    Date of Injury/Surgery:   19  Treatment Diagnosis: Treatment Diagnosis: left TKR    Insurance/Certification information: PT Insurance Information: Medical [place]   Referring Physician:  Referring Practitioner: Dr. Zain Fernandez  Next Doctor Visit:  Suzanne 3  Plan of care signed (Y/N):    Outcome Measure: KOOS 19  Visit# / total visits: 2/10   Pain level: 1/10   Goals:          Long term goals  Time Frame for Long term goals : 60 days  Long term goal 1: indep with home program  Long term goal 2: walk without a limp  Long term goal 3: 0 to 115* active left knee motion   Long term goal 4: imporve KOOS by 5 point    Summary of Evaluation: Assessment: Pt presents with - 15 degrees extension and 100* flexion , mild limp, weakness of quads, edema of knee and 0 to 4/10 pain. Today she started with range of motion and strengthening exs , kinesiotape and vasocompression to decrease edeam and pain. Pt is doing very well        Subjective: My worst thing is getting comfortable in bed. Any changes in Ambulatory Summary Sheet?   None        Objective:  102° degrees flex AROM; 105° w strap AAROM ; EXTN  5° after stretch          Exercises: (No more than 4 columns)   Exercise/Equipment Date  19 Date 09 #2 Date           WARM UP         Nu step Seat /arms 9  Level 3  6 min Seat /arms 9  Level 3  7 min          TABLE      Knee flex supine  1 x 10 reps          Sitting knee flex  1 x 10 reps     SAQ 3x 10 med bolster 3x 10 med bolster    Heel on bolster  2 min 3 min    SLR  1 x 10      Sitting with heel on floor stretching hamstrings  4x 20 sec 4x 30 sec STANDING      Gait training aa Working on knee extension  Pt is walking well without any device Working on knee extension cues for heel strike    balance                  kinesiotape to reduce edema  2 \"octupus type tapes to decrease edema   Purpose and wearing schedule reviewed Still intact    VASo compression 12 min  Became very cold, did use a sheet to keep her warmer  X 15 min                                                   MODALITIES                      Other Therapeutic Activities/Education:        Home Exercise Program:  Written above reports compliant      Manual Treatments:        Modalities:        Communication with other providers:        Assessment:  (Response towards treatment session) (Pain Rating) my leg is frozen 0/10 pain           Plan for Next Session:  advance exs      Time In / Time Out:    805/  905      Timed Code/Total Treatment Minutes:  45 min TE,  15 min vaso       Next Progress Note due:  10      Plan of Care Interventions:  [x] Therapeutic Exercise  [] Modalities:  [x] Therapeutic Activity     [] Ultrasound  [] Estim  [x] Gait Training      [] Cervical Traction [] Lumbar Traction  [x] Neuromuscular Re-education    [] Cold/hotpack [] Iontophoresis   [x] Instruction in HEP      [x] Vasopneumatic   [] Dry Needling    [] Manual Therapy               [] Aquatic Therapy              Electronically signed by:  Grover Romero, 5/20/2019, 8:05 AM

## 2019-05-24 ENCOUNTER — HOSPITAL ENCOUNTER (OUTPATIENT)
Dept: PHYSICAL THERAPY | Age: 69
Setting detail: THERAPIES SERIES
Discharge: HOME | End: 2019-05-24
Payer: COMMERCIAL

## 2019-05-24 PROCEDURE — 97110 THERAPEUTIC EXERCISES: CPT

## 2019-05-24 PROCEDURE — 97530 THERAPEUTIC ACTIVITIES: CPT

## 2019-05-24 PROCEDURE — 97016 VASOPNEUMATIC DEVICE THERAPY: CPT

## 2019-05-24 NOTE — FLOWSHEET NOTE
Outpatient Physical Therapy  [place]           [x] Phone: [phone]   Fax: [phone]  [place]           [] Phone: [phone]   Fax: [phone]        Physical Therapy Daily Treatment Note  Date:  2019    Patient Name:  Kayla Everett    :  1950  MRN: [id number]  Restrictions/Precautions:  none  Diagnosis:   Diagnosis: left TKR    Date of Injury/Surgery:   19  Treatment Diagnosis: Treatment Diagnosis: left TKR    Insurance/Certification information: PT Insurance Information: Medical [place]   Referring Physician:  Referring Practitioner: Dr. Noé Little  Next Doctor Visit:  Suzanne 3  Plan of care signed (Y/N):    Outcome Measure: KOOS   Visit# / total visits: 3/10   Pain level: /10 tightness   Goals:          Long term goals  Time Frame for Long term goals : 60 days  Long term goal 1: indep with home program  Long term goal 2: walk without a limp  Long term goal 3: 0 to 115* active left knee motion   Long term goal 4: imporve KOOS by 5 point    Summary of Evaluation: Assessment: Pt presents with - 15 degrees extension and 100* flexion , mild limp, weakness of quads, edema of knee and 0 to 4/10 pain. Today she started with range of motion and strengthening exs , kinesiotape and vasocompression to decrease edeam and pain. Pt is doing very well        Subjective: \"It feels a little different today, less tight, pt states she was able to sleep in bed the whole night last night, doing well on stairs going one step at a time. Any changes in Ambulatory Summary Sheet?   None        Objective:  101° degrees flex AAROM w strap, ext -7°           Exercises: (No more than 4 columns)   Exercise/Equipment Date  19 Date 09 #2 Date 19-           WARM UP         Nu step Seat /arms 9  Level 3  6 min Seat /arms 9  Level 3  7 min Seat /arms 9  Level 3  8 min         TABLE      Knee flex supine  1 x 10 reps x10         Sitting knee flex  1 x 10 reps  x10   SAQ 3x 10 med bolster 3x 10 med bolster 3x10 med bolster   Heel on bolster  2 min 3 min 3 min   SLR  1 x 10 x10     Sitting with heel on floor stretching hamstrings  4x 20 sec 4x 30 sec 5 reps 20 ct   STANDING      Gait training aa Working on knee extension  Pt is walking well without any device Working on knee extension cues for heel strike Pt gt appears to exhibit better heel strike today   balance                  kinesiotape to reduce edema  2 \"octupus type tapes to decrease edema   Purpose and wearing schedule reviewed Still intact    VASO compression 12 min  Became very cold, did use a sheet to keep her warmer  X 15 min x15 min                                                  MODALITIES                      Other Therapeutic Activities/Education:        Home Exercise Program:  Written above reports compliant      Manual Treatments:        Modalities:        Communication with other providers:        Assessment:  (Response towards treatment session) (Pain Rating) my leg is frozen 0/10 pain           Plan for Next Session:  advance exs      Time In / Time Out:    610-5350      Timed Code/Total Treatment Minutes:  40/55    TE25, TA15  15  vaso       Next Progress Note due:  10      Plan of Care Interventions:  [x] Therapeutic Exercise  [] Modalities:  [x] Therapeutic Activity     [] Ultrasound  [] Estim  [x] Gait Training      [] Cervical Traction [] Lumbar Traction  [x] Neuromuscular Re-education    [] Cold/hotpack [] Iontophoresis   [x] Instruction in HEP      [x] Vasopneumatic   [] Dry Needling    [] Manual Therapy               [] Aquatic Therapy              Electronically signed by:  Chris Truong, 5/24/2019, 8:00 AM

## 2019-05-29 ENCOUNTER — HOSPITAL ENCOUNTER (OUTPATIENT)
Dept: PHYSICAL THERAPY | Age: 69
Setting detail: THERAPIES SERIES
Discharge: HOME | End: 2019-05-29
Payer: COMMERCIAL

## 2019-05-29 PROCEDURE — 97530 THERAPEUTIC ACTIVITIES: CPT

## 2019-05-29 PROCEDURE — 97016 VASOPNEUMATIC DEVICE THERAPY: CPT

## 2019-05-29 PROCEDURE — 97110 THERAPEUTIC EXERCISES: CPT

## 2019-05-29 NOTE — FLOWSHEET NOTE
Outpatient Physical Therapy  [facility]           [x] Phone: [phone]   Fax: [phone]  [facility]           [] Phone: [phone]   Fax: [phone]        Physical Therapy Daily Treatment Note  Date:  2019    Patient Name:  Deja Rodrigues    :  1950  MRN: [id number]  Restrictions/Precautions:  none  Diagnosis:   Diagnosis: left TKR    Date of Injury/Surgery:   19  Treatment Diagnosis: Treatment Diagnosis: left TKR    Insurance/Certification information: PT Insurance Information: Medical [place]   Referring Physician:  Referring Practitioner: Dr. Zain Fernandez  Next Doctor Visit:  Suzanne 3  Plan of care signed (Y/N):    Outcome Measure: KOOS   Visit# / total visits: 4/10   Pain level: 10 tightness   Goals:          Long term goals  Time Frame for Long term goals : 60 days  Long term goal 1: indep with home program  Long term goal 2: walk without a limp  Long term goal 3: 0 to 115* active left knee motion   Long term goal 4: imporve KOOS by 5 point    Summary of Evaluation: Assessment: Pt presents with - 15 degrees extension and 100* flexion , mild limp, weakness of quads, edema of knee and 0 to 4/10 pain. Today she started with range of motion and strengthening exs , kinesiotape and vasocompression to decrease edeam and pain. Pt is doing very well        Subjective: \"It feels a little different today, less tight, pt states was able to get the best night sleep last night, doing well on stairs going one step at a time. Any changes in Ambulatory Summary Sheet?   None        Objective:  104° degrees flex AAROM w strap, ext -3° Gait w SPC into dept          Exercises: (No more than 4 columns)   Exercise/Equipment Date  19 Date 09 #2 Date 19- 19 #4            WARM UP          Nu step Seat /arms 9  Level 3  6 min Seat /arms 9  Level 3  7 min Seat /arms 9  Level 3  8 min Seat /arms 9  Level 3  10 min          TABLE       Knee flex supine  1 x 10 reps x10 1 x 10 reps 5

## 2019-05-31 ENCOUNTER — EMPLOYEE WELLNESS (OUTPATIENT)
Dept: OTHER | Age: 69
End: 2019-05-31

## 2019-05-31 LAB
CHOLESTEROL: 200 MG/DL
GLUCOSE BLD-MCNC: 98 MG/DL (ref 70–99)
HDLC SERPL-MCNC: 53 MG/DL
LDL CHOLESTEROL CALCULATED: 107 MG/DL
PATIENT FASTING?: YES
TRIGL SERPL-MCNC: 198 MG/DL

## 2019-06-01 ENCOUNTER — HOSPITAL ENCOUNTER (OUTPATIENT)
Dept: PHYSICAL THERAPY | Age: 69
Setting detail: THERAPIES SERIES
Discharge: HOME | End: 2019-06-01
Payer: COMMERCIAL

## 2019-06-01 PROCEDURE — 97110 THERAPEUTIC EXERCISES: CPT

## 2019-06-01 NOTE — FLOWSHEET NOTE
Outpatient Physical Therapy  [place]           [x] Phone: [phone]   Fax: [phone]  [place]           [] Phone: [phone]   Fax: [phone]        Physical Therapy Daily Treatment Note  Date:  2019    Patient Name:  Dom Somers    :  1950  MRN: [id number]  Restrictions/Precautions:  none  Diagnosis:   Diagnosis: left TKR    Date of Injury/Surgery:   19  Treatment Diagnosis: Treatment Diagnosis: left TKR    Insurance/Certification information: PT Insurance Information: Medical [place]   Referring Physician:  Referring Practitioner: Dr. Asif Duke  Next Doctor Visit:  Suzanne 3  Plan of care signed (Y/N):    Outcome Measure: KOOS   Visit# / total visits: 4/10   Pain level: 10 tightness   Goals:          Long term goals  Time Frame for Long term goals : 60 days  Long term goal 1: indep with home program  Long term goal 2: walk without a limp  Long term goal 3: 0 to 115* active left knee motion   Long term goal 4: imporve KOOS by 5 point    Summary of Evaluation: Assessment: Pt presents with - 15 degrees extension and 100* flexion , mild limp, weakness of quads, edema of knee and 0 to 4/10 pain. Today she started with range of motion and strengthening exs , kinesiotape and vasocompression to decrease edeam and pain. Pt is doing very well        Subjective: \"It feels a little different today, less tight, pt states was able to get the best night sleep last night, doing well on stairs going one step at a time. Any changes in Ambulatory Summary Sheet? None        Objective:  120° degrees flex  Active , ext -3° Gait  No assistive device. Pt has stitches protruding 3 places   Pt to see Dr. Salima Braswell for routine visit.         Exercises: (No more than 4 columns)   Exercise/Equipment Date  19 Date 09 #2 Date 19- 19 #4 19 #5             WARM UP           Nu step Seat /arms 9  Level 3  6 min Seat /arms 9  Level 3  7 min Seat /arms 9  Level 3  8 min Seat /arms 9  Level 3  10 min Seat/arms 9  Level 3   5 min  Seat/arms 8  5 min             TABLE        Knee flex supine  1 x 10 reps x10 1 x 10 reps 5 ct  not needed           Sitting knee flex  1 x 10 reps  x10 1 x 10 reps 5 ct 10x  Hold 10 ct   SAQ 3x 10 med bolster 3x 10 med bolster 3x10 med bolster 3x10 med bolster 3x 10 med bolster   Heel on bolster  2 min 3 min 3 min 4 min 5 min   SLR  1 x 10 x10 2 x 10      Sitting with heel on floor stretching hamstrings  4x 20 sec 4x 30 sec 5 reps 20 ct 5 reps 20 ct    Quad sets     10x 10 sec    STANDING        Gait training  Working on knee extension  Pt is walking well without any device Working on knee extension cues for heel strike Pt gt appears to exhibit better heel strike today better heel strike today with gait pattern No limp, just walking slowly   balance                        kinesiotape to reduce edema  2 \"octupus type tapes to decrease edema   Purpose and wearing schedule reviewed Still intact   120* flex active   -3*  Active ext   VASO compression 12 min  Became very cold, did use a sheet to keep her warmer  X 15 min x15 min x15 min DC  Not needed                                                                 MODALITIES                            Other Therapeutic Activities/Education:        Home Exercise Program:  Written above reports compliant       Manual Treatments:        Modalities:        Communication with other providers:        Assessment:  (Response towards treatment session) (Pain Rating) my leg is frozen 0/10 pain           Plan for Next Session:  advance exs      Time In / Time Out:   915 to 955    Timed Code/Total Treatment Minutes:   40    TE       Next Progress Note due:  10      Plan of Care Interventions:  [x] Therapeutic Exercise  [] Modalities:  [x] Therapeutic Activity     [] Ultrasound  [] Estim  [x] Gait Training      [] Cervical Traction [] Lumbar Traction  [x] Neuromuscular Re-education    [] Cold/hotpack [] Iontophoresis   [x] Instruction in HEP      [x] Vasopneumatic   [] Dry Needling    [] Manual Therapy               [] Aquatic Therapy              Electronically signed by:  Hossein Salomon, 6/1/2019, 9:17 AM

## 2019-06-03 ENCOUNTER — OFFICE VISIT (OUTPATIENT)
Dept: ORTHOPEDIC SURGERY | Age: 69
End: 2019-06-03

## 2019-06-03 VITALS — HEART RATE: 91 BPM | OXYGEN SATURATION: 97 % | BODY MASS INDEX: 40.08 KG/M2 | WEIGHT: 234.8 LBS | HEIGHT: 64 IN

## 2019-06-03 DIAGNOSIS — Z96.652 STATUS POST TOTAL LEFT KNEE REPLACEMENT: Primary | ICD-10-CM

## 2019-06-03 PROCEDURE — 99024 POSTOP FOLLOW-UP VISIT: CPT | Performed by: ORTHOPAEDIC SURGERY

## 2019-06-03 RX ORDER — PRAVASTATIN SODIUM 10 MG
10 TABLET ORAL DAILY
Qty: 90 TABLET | Refills: 0 | Status: SHIPPED | OUTPATIENT
Start: 2019-06-03 | End: 2019-06-05 | Stop reason: SDUPTHER

## 2019-06-03 NOTE — PROGRESS NOTES
Patient present to the office today for post op Left TKA DOS 4/23/19  Patient reports that she has trouble sleeping at night due to not being able to get her knee in a comfortable position. Pt report that at times her pain level is a 2/10 (more uncomfortable than pain). No problems ambulating, steps.

## 2019-06-04 ENCOUNTER — HOSPITAL ENCOUNTER (OUTPATIENT)
Dept: PHYSICAL THERAPY | Age: 69
Setting detail: THERAPIES SERIES
Discharge: HOME | End: 2019-06-04
Payer: COMMERCIAL

## 2019-06-04 PROCEDURE — 97110 THERAPEUTIC EXERCISES: CPT

## 2019-06-04 NOTE — FLOWSHEET NOTE
9  Level 3   5 min  Seat/arms 8  5 min   Seat/arms 8  10 min Level 3           TABLE        Knee flex supine 1 x 10 reps x10 1 x 10 reps 5 ct  not needed X 10 for measurement            Sitting knee flex 1 x 10 reps  x10 1 x 10 reps 5 ct 10x  Hold 10 ct 10x  Hold 10 ct   SAQ 3x 10 med bolster 3x10 med bolster 3x10 med bolster 3x 10 med bolster 3x 10 med bolster   Heel on bolster  3 min 3 min 4 min 5 min 5 min   SLR 1 x 10 x10 2 x 10  2 x 10     Sitting with heel on floor stretching hamstrings  4x 30 sec 5 reps 20 ct 5 reps 20 ct     Quad sets    10x 10 sec  10x 10 sec    STANDING        Gait training  Working on knee extension cues for heel strike Pt gt appears to exhibit better heel strike today better heel strike today with gait pattern No limp, just walking slowly No limp, just walking slowly - heel strike with gait pattern   balance                        kinesiotape to reduce edema  Still intact   120* flex active   -3*  Active ext 114* flex active   -3*  Active ext   VASO compression X 15 min x15 min x15 min DC  Not needed  ----                                                                MODALITIES                            Other Therapeutic Activities/Education:        Home Exercise Program:  Written above reports compliant       Manual Treatments:        Modalities:        Communication with other providers:        Assessment:  (Response towards treatment session) (Pain Rating)  0/10 pain           Plan for Next Session:  advance exs      Time In / Time Out:   1601/1640    Timed Code/Total Treatment Minutes:   44'    TE       Next Progress Note due:  10      Plan of Care Interventions:  [x] Therapeutic Exercise  [] Modalities:  [x] Therapeutic Activity     [] Ultrasound  [] Estim  [x] Gait Training      [] Cervical Traction [] Lumbar Traction  [x] Neuromuscular Re-education    [] Cold/hotpack [] Iontophoresis   [x] Instruction in HEP      [x] Vasopneumatic   [] Dry Needling    [] Manual Therapy [] Aquatic Therapy              Electronically signed by:  Roddy Booth, 6/4/2019, 4:04 PM

## 2019-06-05 ENCOUNTER — HOSPITAL ENCOUNTER (OUTPATIENT)
Dept: WOMENS IMAGING | Age: 69
Discharge: HOME | End: 2019-06-05
Payer: COMMERCIAL

## 2019-06-05 ENCOUNTER — TELEPHONE (OUTPATIENT)
Dept: FAMILY MEDICINE CLINIC | Age: 69
End: 2019-06-05

## 2019-06-05 DIAGNOSIS — Z12.31 ENCOUNTER FOR SCREENING MAMMOGRAM FOR BREAST CANCER: ICD-10-CM

## 2019-06-05 PROCEDURE — 77067 SCR MAMMO BI INCL CAD: CPT

## 2019-06-05 RX ORDER — FOLIC ACID 1 MG/1
1 TABLET ORAL DAILY
Qty: 90 TABLET | Refills: 1 | Status: SHIPPED | OUTPATIENT
Start: 2019-06-05 | End: 2019-09-10 | Stop reason: SDUPTHER

## 2019-06-05 RX ORDER — PRAVASTATIN SODIUM 10 MG
10 TABLET ORAL DAILY
Qty: 90 TABLET | Refills: 1 | Status: SHIPPED | OUTPATIENT
Start: 2019-06-05 | End: 2019-09-10 | Stop reason: SDUPTHER

## 2019-06-05 NOTE — TELEPHONE ENCOUNTER
----- Message from Alexei Kramer sent at 6/5/2019 12:16 PM EDT -----  Contact: PATIENT  PRAVASTATIN ? NEVER CALLED IN. ASKED FOR IT WEEKS AGO.    XARELTO 20 MG  FOLIC ACID 1MG  [facility] MAIL ORDER  -[phone]

## 2019-06-06 ENCOUNTER — HOSPITAL ENCOUNTER (OUTPATIENT)
Dept: PHYSICAL THERAPY | Age: 69
Setting detail: THERAPIES SERIES
Discharge: HOME | End: 2019-06-06
Payer: COMMERCIAL

## 2019-06-06 PROCEDURE — 97110 THERAPEUTIC EXERCISES: CPT

## 2019-06-06 PROCEDURE — 97112 NEUROMUSCULAR REEDUCATION: CPT

## 2019-06-06 ASSESSMENT — ENCOUNTER SYMPTOMS
CHEST TIGHTNESS: 0
COLOR CHANGE: 0
BACK PAIN: 0

## 2019-06-06 NOTE — PROGRESS NOTES
Subjective:      Patient ID: Chica Aden is a 70 y.o. female. Patient present to the office today for post op Left TKA DOS 4/23/19  Patient reports that she has trouble sleeping at night due to not being able to get her knee in a comfortable position. Pt report that at times her pain level is a 2/10 (more uncomfortable than pain). No problems ambulating, steps. She comes in today for her 6 week postop recheck after left TKA. Overall she feels that she is continuing to progress well and is not having much pain in her left knee. She has completed working with physical therapy. Patient denies any new injury to the involved extremity/ joint, denies numbness or tingling in the involved extremity and denies fever or chills. 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 Respiratory: Negative for chest tightness. Cardiovascular: Negative for chest pain. Musculoskeletal: Negative for arthralgias, back pain, gait problem, joint swelling and myalgias. Skin: Negative for color change, pallor, rash and wound. Neurological: Negative for weakness and numbness. Objective:   Physical Exam   Constitutional: She is oriented to person, place, and time. Vital signs are normal. She appears well-developed and well-nourished. HENT:   Head: Normocephalic and atraumatic. Eyes: Pupils are equal, round, and reactive to light. Neck: Normal range of motion. Musculoskeletal: She exhibits no edema, tenderness or deformity. Right hip: Normal.        Left hip: Normal.        Right knee: She exhibits normal range of motion, no swelling, no effusion, no ecchymosis, no deformity, no laceration, no erythema, normal alignment, no LCL laxity, normal patellar mobility, no bony tenderness and no MCL laxity. No tenderness found. No medial joint line, no lateral joint line and no patellar tendon tenderness noted. Left knee: She exhibits normal range of motion, no swelling, no effusion, no ecchymosis, no deformity, no laceration, no erythema, normal alignment, no LCL laxity, normal patellar mobility, no bony tenderness and no MCL laxity. No tenderness found. No medial joint line and no lateral joint line tenderness noted. Neurological: She is alert and oriented to person, place, and time. She has normal strength. No sensory deficit. Skin: Skin is warm and dry. No rash noted. No erythema. No pallor. Left knee-Incision healing well, clean, dry, intact, with no erythema, no drainage, and no signs of infection. 0-120°    XRAY  X-ray 3 views of the left knee obtained and reviewed by me today in the office demonstrates age appropriate bone density throughout with well-positioned left total knee arthroplasty, there has been no change of position of components compared to postoperative x-rays, normal tracking of the patella, no acute osseous abnormalities, no bony prominences, no loose bodies. Assessment:      Left TKA, 6 weeks      Plan:       I discussed with her today her x-ray findings. I explained to her that her implants are stable and remain in good alignment. I discussed with her today that she is continuing to progress extremely well. I discussed with the patient today that their are symptoms are normal and should improve with time. Continue weight-bearing as tolerated. Continue range of motion exercises as instructed. Ice and elevate as needed. Tylenol or Motrin for pain. I will allow her to return to work in 2 weeks. Follow up in 6 weeks for recheck with x-rays of the left knee.           Mireille Rai, DO

## 2019-06-06 NOTE — FLOWSHEET NOTE
Outpatient Physical Therapy  [place]           [x] Phone: [phone]   Fax: [phone]  [place]           [] Phone: [phone]   Fax: [phone]        Physical Therapy Daily Treatment Note  Date:  2019    Patient Name:  Isela Jones    :  1950  MRN: [id number]  Restrictions/Precautions:  none  Diagnosis:   Diagnosis: left TKR    Date of Injury/Surgery:   19  Treatment Diagnosis: Treatment Diagnosis: left TKR    Insurance/Certification information: PT Insurance Information: Medical [place]   Referring Physician:  Referring Practitioner: Dr. Sandra Banks  Next Doctor Visit:  Suznane 3  Plan of care signed (Y/N):    Outcome Measure: KOOS 19  Visit# / total visits: 7/10   Pain level: 0/10 tightness   Goals:          Long term goals  Time Frame for Long term goals : 60 days  Long term goal 1: indep with home program  Long term goal 2: walk without a limp  Long term goal 3: 0 to 115* active left knee motion   Long term goal 4: imporve KOOS by 5 point    Summary of Evaluation: Assessment: Pt presents with - 15 degrees extension and 100* flexion , mild limp, weakness of quads, edema of knee and 0 to 4/10 pain. Today she started with range of motion and strengthening exs , kinesiotape and vasocompression to decrease edeam and pain. Pt is doing very well        Subjective: \"Drs visit went well. Pleased with improvement and stitiches will work themselves out. \"      Any changes in Ambulatory Summary Sheet? None        Objective: SUPINE 125 ° degrees flex  Active , ext -0° Gait  No assistive device.   Pt has stitches protruding 3 places           Exercises: (No more than 4 columns)   Exercise/Equipment Date 09 #2 Date 19- 19 #4 19 #5 19 #6 19  #7              WARM UP            Nu step Seat /arms 9  Level 3  7 min Seat /arms 9  Level 3  8 min Seat /arms 9  Level 3  10 min Seat/arms 9  Level 3   5 min  Seat/arms 8  5 min   Seat/arms 8  10 min Level 3 Seat 9 2 min  Seat 8 8 min  Level 4            TABLE         Knee flex supine 1 x 10 reps x10 1 x 10 reps 5 ct  not needed X 10 for measurement              Sitting knee flex 1 x 10 reps  x10 1 x 10 reps 5 ct 10x  Hold 10 ct 10x  Hold 10 ct 5x 10 sec    SAQ 3x 10 med bolster 3x10 med bolster 3x10 med bolster 3x 10 med bolster 3x 10 med bolster 3x 10    Heel on bolster  3 min 3 min 4 min 5 min 5 min 5 min   SLR 1 x 10 x10 2 x 10  2 x 10      Sitting with heel on floor stretching hamstrings  4x 30 sec 5 reps 20 ct 5 reps 20 ct      Quad sets    10x 10 sec  10x 10 sec  10x 10 sec   STANDING         Gait training  Working on knee extension cues for heel strike Pt gt appears to exhibit better heel strike today better heel strike today with gait pattern No limp, just walking slowly No limp, just walking slowly - heel strike with gait pattern Does well walking, slow but no limp  Feet regular width apart    Balance ankle sways eyes open/ closed.         10x each side                     kinesiotape to reduce edema  Still intact   120* flex active   -3*  Active ext 114* flex active   -3*  Active ext 125* active knee flex  0* active ext    VASO compression X 15 min x15 min x15 min DC  Not needed  ----                                                                        MODALITIES                               Other Therapeutic Activities/Education:        Home Exercise Program:  Written above reports compliant       Manual Treatments:        Modalities:        Communication with other providers:        Assessment:  (Response towards treatment session) (Pain Rating)  0/10 pain           Plan for Next Session:  advance exs      Time In / Time Out:  930 to 1013  Timed Code/Total Treatment Minutes:   30 min '    TE ,  13 min NR    43 min totoal      Next Progress Note due:  10      Plan of Care Interventions:  [x] Therapeutic Exercise  [] Modalities:  [x] Therapeutic Activity     [] Ultrasound  [] Estim  [x] Gait Training      [] Cervical Traction [] Lumbar Traction  [x] Neuromuscular Re-education    [] Cold/hotpack [] Iontophoresis   [x] Instruction in HEP      [x] Vasopneumatic   [] Dry Needling    [] Manual Therapy               [] Aquatic Therapy              Electronically signed by:  Curt Winter, 6/6/2019, 9:43 AM

## 2019-06-10 VITALS — BODY MASS INDEX: 39.48 KG/M2 | WEIGHT: 230 LBS

## 2019-06-11 RX ORDER — RIVAROXABAN 20 MG/1
TABLET, FILM COATED ORAL
Qty: 90 TABLET | Refills: 0 | Status: SHIPPED | OUTPATIENT
Start: 2019-06-11 | End: 2020-01-09 | Stop reason: SDUPTHER

## 2019-06-12 ENCOUNTER — HOSPITAL ENCOUNTER (OUTPATIENT)
Dept: PHYSICAL THERAPY | Age: 69
Setting detail: THERAPIES SERIES
Discharge: HOME | End: 2019-06-12
Payer: COMMERCIAL

## 2019-06-12 PROCEDURE — 97110 THERAPEUTIC EXERCISES: CPT

## 2019-06-12 NOTE — DISCHARGE SUMMARY
Outpatient Physical Therapy        [] Phone: [phone]   Fax: [phone]  Physician:  Dr. Sarita Valverde         From: Raul Yarbrough, PT     Patient: Radha Urrutia                    : 1950        Date: 2019    []  Progress Note                [x]  Discharge Note    Total Visits to date:    8   Cancels/No-shows to date:   0    Subjective:  No pain. Assessment:    Long term goals  Time Frame for Long term goals : 60 days  Long term goal 1: indep with home program  MET  Long term goal 2: walk without a limp MET  Long term goal 3: 0 to 115* active left knee motion  MET  Long term goal 4: imporve KOOS by 5 point  MET        Goal Status:  [x] Achieved [] Partially Achieved  [] Not Achieved       C   Patient Status:      [x] Patient now discharged           Electronically signed by:  Raul Yarbrough PT, 2019, 2:32 PM    If you have any questions or concerns, please don't hesitate to call.   Thank you for your referral.

## 2019-06-12 NOTE — FLOWSHEET NOTE
Outpatient Physical Therapy  [place]           [x] Phone: [phone]   Fax: [phone]  [place]           [] Phone: [phone]   Fax: [phone]        Physical Therapy Daily Treatment Note  Date:  2019    Patient Name:  Tracy Huff    :  1950  MRN: [id number]  Restrictions/Precautions:  none  Diagnosis:   Diagnosis: left TKR    Date of Injury/Surgery:   19  Treatment Diagnosis: Treatment Diagnosis: left TKR    Insurance/Certification information: PT Insurance Information: Medical [place]   Referring Physician:  Referring Practitioner: Dr. Sammy Beal  Next Doctor Visit:  Suzanne 3  Plan of care signed (Y/N):    Outcome Measure: KOOS 19  Visit# / total visits:  8/10   Pain level: 0/10 tightness   Goals:          Long term goals  Time Frame for Long term goals : 60 days  Long term goal 1: indep with home program  MET  Long term goal 2: walk without a limp MET  Long term goal 3: 0 to 115* active left knee motion  MET  Long term goal 4: imporve KOOS by 5 point  MET    Summary of Evaluation: Assessment: Pt presents with - 15 degrees extension and 100* flexion , mild limp, weakness of quads, edema of knee and 0 to 4/10 pain. Today she started with range of motion and strengthening exs , kinesiotape and vasocompression to decrease edeam and pain. Pt is doing very well. Subjective: \" I went to neighbors pool and did well. Sitting too long bothers her the most.        Any changes in Ambulatory Summary Sheet? None        Objective: SUPINE 125 ° degrees flex  Active , ext -0° Gait  No assistive device.   Pt has stitches protruding 3 places           Exercises: (No more than 4 columns)   Exercise/Equipment Date 09 #2 Date 19- 19 #4 19 #5 19 #6 19  #7   19  #8               WARM UP             Nu step Seat /arms 9  Level 3  7 min Seat /arms 9  Level 3  8 min Seat /arms 9  Level 3  10 min Seat/arms 9  Level 3   5 min  Seat/arms 8  5 min   Seat/arms 8  10 min Level 3 Seat 9 2 min  Seat 8 8 min  Level 4 Seat 8 level 4   10 min             TABLE          Knee flex supine 1 x 10 reps x10 1 x 10 reps 5 ct  not needed X 10 for measurement   5x 10 sec              Sitting knee flex 1 x 10 reps  x10 1 x 10 reps 5 ct 10x  Hold 10 ct 10x  Hold 10 ct 5x 10 sec     SAQ 3x 10 med bolster 3x10 med bolster 3x10 med bolster 3x 10 med bolster 3x 10 med bolster 3x 10  30x    Heel on bolster  3 min 3 min 4 min 5 min 5 min 5 min 5 min   SLR 1 x 10 x10 2 x 10  2 x 10       Sitting with heel on floor stretching hamstrings  4x 30 sec 5 reps 20 ct 5 reps 20 ct    5x 10 sec 3   Quad sets    10x 10 sec  10x 10 sec  10x 10 sec 10x  10 sec    STANDING          Gait training  Working on knee extension cues for heel strike Pt gt appears to exhibit better heel strike today better heel strike today with gait pattern No limp, just walking slowly No limp, just walking slowly - heel strike with gait pattern Does well walking, slow but no limp  Feet regular width apart  normal   Balance ankle sways eyes open/ closed.         10x each side                        kinesiotape to reduce edema  Still intact   120* flex active   -3*  Active ext 114* flex active   -3*  Active ext 125* active knee flex  0* active ext  0 to 120 * active    VASO compression X 15 min x15 min x15 min DC  Not needed  ----                                                                                MODALITIES                                  Other Therapeutic Activities/Education:        Home Exercise Program:  Written above reports compliant       Manual Treatments:        Modalities:        Communication with other providers:        Assessment:  (Response towards treatment session) (Pain Rating)  0/10 pain           Plan for Next Session: DC    Time In / Time Out:  1300 to 1330 Timed Code/Total Treatment Minutes:   30 min '    TE ,     Next Progress Note due:  10      Plan of Care Interventions:  [x] Therapeutic Exercise  []

## 2019-06-19 ENCOUNTER — TELEPHONE (OUTPATIENT)
Dept: ORTHOPEDIC SURGERY | Age: 69
End: 2019-06-19

## 2019-07-17 RX ORDER — MELOXICAM 7.5 MG/1
7.5 TABLET ORAL DAILY
COMMUNITY
End: 2019-07-17 | Stop reason: SDUPTHER

## 2019-07-17 RX ORDER — MELOXICAM 7.5 MG/1
7.5 TABLET ORAL DAILY
Qty: 90 TABLET | Refills: 1 | Status: SHIPPED | OUTPATIENT
Start: 2019-07-17 | End: 2020-01-10 | Stop reason: SDUPTHER

## 2019-07-17 RX ORDER — LOSARTAN POTASSIUM AND HYDROCHLOROTHIAZIDE 25; 100 MG/1; MG/1
1 TABLET ORAL DAILY
Qty: 90 TABLET | Refills: 1 | Status: SHIPPED | OUTPATIENT
Start: 2019-07-17 | End: 2019-12-11

## 2019-07-29 ENCOUNTER — OFFICE VISIT (OUTPATIENT)
Dept: ORTHOPEDIC SURGERY | Age: 69
End: 2019-07-29
Payer: COMMERCIAL

## 2019-07-29 VITALS — BODY MASS INDEX: 38.41 KG/M2 | RESPIRATION RATE: 16 BRPM | HEIGHT: 64 IN | WEIGHT: 225 LBS

## 2019-07-29 DIAGNOSIS — Z09 POSTOP CHECK: ICD-10-CM

## 2019-07-29 DIAGNOSIS — Z96.652 STATUS POST TOTAL LEFT KNEE REPLACEMENT: Primary | ICD-10-CM

## 2019-07-29 PROCEDURE — 99212 OFFICE O/P EST SF 10 MIN: CPT | Performed by: ORTHOPAEDIC SURGERY

## 2019-07-29 ASSESSMENT — ENCOUNTER SYMPTOMS
BACK PAIN: 0
COLOR CHANGE: 0
CHEST TIGHTNESS: 0

## 2019-07-29 NOTE — PROGRESS NOTES
laceration, no erythema, normal alignment, no LCL laxity, normal patellar mobility, no bony tenderness and no MCL laxity. No tenderness found. No medial joint line and no lateral joint line tenderness noted. Neurological: She is alert and oriented to person, place, and time. She has normal strength. No sensory deficit. Skin: Skin is warm and dry. No rash noted. No erythema. No pallor. Left knee-Incision healing well, clean, dry, intact, with no erythema, no drainage, and no signs of infection. 0-130°      Xr Knee Left (3 Views)    Result Date: 7/29/2019  XRAY X-ray 3 views of the left knee obtained and reviewed by me today in the office demonstrates age appropriate bone density throughout with well-positioned left total knee arthroplasty, there is been no change in position of components compared to prior x-rays, normal tracking of the patella, no acute osseous abnormalities, no bony prominences, no loose bodies. Assessment:      Left TKA, 3 months      Plan:         I discussed with her today her x-ray findings. I explained to her that her implants are stable and remain in good alignment. I discussed with her today that she is continuing to progress extremely well. I discussed with the patient today that their are symptoms are normal and should improve with time. Continue weight-bearing as tolerated. Continue range of motion exercises as instructed. Ice and elevate as needed. Tylenol or Motrin for pain. Follow up in 9 months for recheck with x-rays of the left knee.           Mireille Rai, DO

## 2019-09-08 DIAGNOSIS — I10 HYPERTENSION, ESSENTIAL: ICD-10-CM

## 2019-09-08 DIAGNOSIS — E72.11 HOMOCYSTINEMIA (HCC): ICD-10-CM

## 2019-09-08 DIAGNOSIS — K76.0 NON-ALCOHOLIC FATTY LIVER DISEASE: ICD-10-CM

## 2019-09-08 RX ORDER — ASPIRIN 81 MG/1
81 TABLET ORAL DAILY
COMMUNITY
End: 2020-01-09 | Stop reason: SDUPTHER

## 2019-09-08 RX ORDER — ACETAMINOPHEN 160 MG
TABLET,DISINTEGRATING ORAL
COMMUNITY
End: 2020-02-20

## 2019-09-10 ENCOUNTER — TELEPHONE (OUTPATIENT)
Dept: FAMILY MEDICINE CLINIC | Age: 69
End: 2019-09-10

## 2019-09-10 RX ORDER — PRAVASTATIN SODIUM 10 MG
10 TABLET ORAL DAILY
Qty: 90 TABLET | Refills: 0 | Status: SHIPPED | OUTPATIENT
Start: 2019-09-10 | End: 2019-12-16 | Stop reason: SDUPTHER

## 2019-09-10 RX ORDER — FOLIC ACID 1 MG/1
1 TABLET ORAL DAILY
Qty: 90 TABLET | Refills: 0 | Status: SHIPPED | OUTPATIENT
Start: 2019-09-10 | End: 2019-12-16 | Stop reason: SDUPTHER

## 2019-11-25 ENCOUNTER — TELEPHONE (OUTPATIENT)
Dept: ORTHOPEDIC SURGERY | Age: 69
End: 2019-11-25

## 2019-11-25 RX ORDER — AMOXICILLIN 500 MG/1
CAPSULE ORAL
Qty: 7 CAPSULE | Refills: 0 | Status: SHIPPED | OUTPATIENT
Start: 2019-11-25 | End: 2019-12-11

## 2019-12-11 ENCOUNTER — OFFICE VISIT (OUTPATIENT)
Dept: ORTHOPEDIC SURGERY | Age: 69
End: 2019-12-11
Payer: COMMERCIAL

## 2019-12-11 VITALS
WEIGHT: 251.6 LBS | HEART RATE: 86 BPM | BODY MASS INDEX: 42.95 KG/M2 | HEIGHT: 64 IN | RESPIRATION RATE: 16 BRPM | OXYGEN SATURATION: 96 %

## 2019-12-11 PROCEDURE — 99213 OFFICE O/P EST LOW 20 MIN: CPT | Performed by: PHYSICIAN ASSISTANT

## 2019-12-11 PROCEDURE — 20610 DRAIN/INJ JOINT/BURSA W/O US: CPT | Performed by: PHYSICIAN ASSISTANT

## 2019-12-11 NOTE — PROGRESS NOTES
I reviewed and agree with the portions of the HPI, review of systems, vital documentation and plan performed by my staff and have added/addended where appropriate. Review of Systems   Constitutional: Negative. HENT: Negative. Eyes: Negative. Respiratory: Negative. Cardiovascular: Negative. Gastrointestinal: Negative. Genitourinary: Negative. Musculoskeletal: Positive for arthralgias and gait problem. Skin: Negative. Negative for rash and wound. Psychiatric/Behavioral: Negative. Fallon Soriano is a 78y.o. year old female who complains of Right hip pain that started about 4 months. No injury, previous surgery, or conservative treatments reported. Pain is described as an aching pain rated at 4/10 along the lateral aspect of the hip worsened by ambulating, stairs, sitting, standing, or lying on left side. Hx of spinal stenosis. Patient requests steroid injection administered today. Past Medical History:   Diagnosis Date    Arthritis     generalized OA    DVT of lower extremity (deep venous thrombosis) ([facility] [address]     Left 2012    Fracture of left ankle 02/01/2004    GERD (gastroesophageal reflux disease)     H/O Doppler ultrasound 11/2/14    Venous Doppler. Left lower extremity probably old DVT in the distal SFA and popliteal areas otherwise right lower extremity reveals normal findings.  H/O Doppler ultrasound 09/19/2017    LLE - chronic DVT, severe veous reflux    H/O echocardiogram 09/26/2016    EF60% mild MR, TR, mild pulm htn    H/O echocardiogram 07/18/2018    EF55-60% mild TR    Homocystinemia (HCC)     Hx of blood clots     \"hx of PE- 2013 and had DVT left leg that went to my lung\"    Hx of Doppler echocardiogram 12/22/2017    EF 50-60% mild biatrial enlargement, mild to mod TR, mildly elevated pulmonary artery pressure.     Hyperlipidemia     Hypertension, essential     Left leg swelling     wears compression stocking    Non-alcoholic fatty liver disease     Obesity     Osteoarthritis     Other chest pain 12/17/2018    Pulmonary embolism ([address] 2012    Bilateral    Spinal stenosis     lumbar spine--scheduled for epidural injection 3/19/2018    Varicose veins of lower extremity     Wears glasses        Past Surgical History:   Procedure Laterality Date    ANKLE SURGERY Left 02/2004    \"have plate and pins still\"    COLONOSCOPY  2010?     DILATION AND CURETTAGE OF UTERUS  May 2012    TONSILLECTOMY      age 10   [facility] TOTAL KNEE ARTHROPLASTY Left 4/23/2019    KNEE TOTAL ARTHROPLASTY LEFT performed by Kellen Nixon DO at [address] EXTRACTION         Family History   Problem Relation Age of Onset    COPD Mother     Cancer Father         kidney    Heart Attack Father     Heart Disease Father     No Known Problems Sister     No Known Problems Sister        Social History     Socioeconomic History    Marital status:      Spouse name: None    Number of children: 2    Years of education: None    Highest education level: None   Occupational History    None   Social Needs    Financial resource strain: None    Food insecurity:     Worry: None     Inability: None    Transportation needs:     Medical: None     Non-medical: None   Tobacco Use    Smoking status: Never Smoker    Smokeless tobacco: Never Used   Substance and Sexual Activity    Alcohol use: Yes     Comment: average \"one time per [employer] Drug use: No    Sexual activity: Never   Lifestyle    Physical activity:     Days per week: None     Minutes per session: None    Stress: None   Relationships    Social connections:     Talks on phone: None     Gets together: None     Attends Sabianism service: None     Active member of club or organization: None     Attends meetings of clubs or organizations: None     Relationship status: None    Intimate partner violence:     Fear of current or ex partner: None     Emotionally abused: None     Physically abused: None Forced sexual activity: None   Other Topics Concern    None   Social History Narrative    None       Current Outpatient Medications   Medication Sig Dispense Refill    folic acid (FOLVITE) 1 MG tablet Take 1 tablet by mouth daily 90 tablet 0    aspirin 81 MG EC tablet Take 81 mg by mouth daily      diclofenac sodium 1 % GEL Apply 4 g topically Apply 4 gms to lower back and knees 2-3 times a day as needed      meloxicam (MOBIC) 7.5 MG tablet Take 1 tablet by mouth daily 90 tablet 1    XARELTO 20 MG TABS tablet TAKE 1 TABLET BY MOUTH ONE TIME A DAY 90 tablet 0    magnesium gluconate (MAGONATE) 500 MG tablet Take 500 mg by mouth Daily      pravastatin (PRAVACHOL) 10 MG tablet Take 1 tablet by mouth daily 90 tablet 0    Misc Natural Products (GLUCOSAMINE CHOND COMPLEX/MSM PO) Take by mouth      Cholecalciferol (VITAMIN D3) 2000 units CAPS Take by mouth       No current facility-administered medications for this visit. Allergies   Allergen Reactions    Ace Inhibitors        Review of Systems:  See above      Physical Exam:   Pulse 86   Resp 16   Ht 5' 4\" (1.626 m)   Wt 251 lb 9.6 oz (114.1 kg)   LMP 01/16/2010   SpO2 96%   BMI 43.19 kg/m²        Gait is Normal.   Gen/Psych:Examination reveals a pleasant individual in no acute distress. The patient is oriented to time, place and person. The patient's mood and affect are appropriate.     Lymph: The lymphatic examination bilaterally reveals all areas to be without enlargement or induration.      Skin intact without lymphadenopathy, discoloration, or abnormal temperature.      Vascular: There is intact, symmetric circulation in both lower extremities. right hip exam:  Sensation is intact to the right lower extremity to light touch. Muscular strength is clinically appropriate bilaterally. No pain with passive internal or external rotation of the right hip.   There is tenderness to palpation over the greater trochanter and the bursa area over the right trochanteric bursa. 5/5 hip abduction, 5/5 hip adduction. Outsiderecord review: none    Imaging studies:  taken and reviewed  1 views of the right hip, pelvis show moderate to severe degenerative changes within the right hip joint with no fractures or dislocations. The official read and interpretation of these x-rays will be done by the the [facility]       Impression:  right hip trochanteric bursitis      Plan:    The most likely impression, expected course, diagnostic and treatment options were discussed, will proceed with: Injection in right hip bursa  Follow-up as needed, if pain returns it is okay to call the office for another injection if it has been at least 6 weeks. right hip greater trochanteric bursa injection procedure note    Pre-procedure diagnosis:  right hip greater trochanteric bursitis    Post-procedure diagnosis:  same    Procedure: The planned procedure/risks/benefits/alternatives were discussed with the patient. Risks include, but are not limited to, increased pain, drug reaction, infection, bleeding, lack of improvement, neurovascular injury, and increased blood sugar levels. The patient understood and all of their questions were answered. The lateral hip was prepped with alcohol and then anesthetized with Ethyl Chloride spray. A 22 gauge needle was advanced into the greater trochanteric bursa  without difficulty. The bursa was then injected with 3 ml 1% lidocaine 2 ml of kenalog 40 mg/ml. The injection site was cleansed with isopropyl alcohol and a band-aid was placed. Complications:  None, the patient tolerated the procedure well. Instructions: The patient was advised to rest the hip and decrease activity for the next 24 to 48 hours. May use prescription or OTC pain relievers as needed for any post-injection pain as well as ice. Follow blood sugars for the next few days.

## 2019-12-16 RX ORDER — PRAVASTATIN SODIUM 10 MG
10 TABLET ORAL DAILY
Qty: 90 TABLET | Refills: 0 | Status: SHIPPED | OUTPATIENT
Start: 2019-12-16 | End: 2020-03-16 | Stop reason: SDUPTHER

## 2019-12-16 RX ORDER — FOLIC ACID 1 MG/1
1 TABLET ORAL DAILY
Qty: 90 TABLET | Refills: 0 | Status: SHIPPED | OUTPATIENT
Start: 2019-12-16 | End: 2020-03-16 | Stop reason: SDUPTHER

## 2020-01-06 ASSESSMENT — ENCOUNTER SYMPTOMS
EYES NEGATIVE: 1
RESPIRATORY NEGATIVE: 1
GASTROINTESTINAL NEGATIVE: 1

## 2020-01-08 ENCOUNTER — TELEPHONE (OUTPATIENT)
Dept: FAMILY MEDICINE CLINIC | Age: 70
End: 2020-01-08

## 2020-01-09 RX ORDER — ASPIRIN 81 MG/1
81 TABLET ORAL DAILY
Qty: 384 TABLET | Refills: 0 | COMMUNITY
Start: 2020-01-09

## 2020-01-10 RX ORDER — MELOXICAM 7.5 MG/1
7.5 TABLET ORAL DAILY
Qty: 90 TABLET | Refills: 1 | Status: SHIPPED | OUTPATIENT
Start: 2020-01-10 | End: 2020-01-13 | Stop reason: SDUPTHER

## 2020-01-13 ENCOUNTER — TELEPHONE (OUTPATIENT)
Dept: FAMILY MEDICINE CLINIC | Age: 70
End: 2020-01-13

## 2020-01-13 RX ORDER — MELOXICAM 7.5 MG/1
7.5 TABLET ORAL DAILY
Qty: 90 TABLET | Refills: 1 | Status: SHIPPED | OUTPATIENT
Start: 2020-01-13 | End: 2020-03-31 | Stop reason: SDUPTHER

## 2020-01-13 RX ORDER — LOSARTAN POTASSIUM AND HYDROCHLOROTHIAZIDE 25; 100 MG/1; MG/1
1 TABLET ORAL DAILY
Qty: 90 TABLET | Refills: 1 | Status: SHIPPED | OUTPATIENT
Start: 2020-01-13 | End: 2020-01-14 | Stop reason: SDUPTHER

## 2020-01-14 RX ORDER — LOSARTAN POTASSIUM AND HYDROCHLOROTHIAZIDE 25; 100 MG/1; MG/1
1 TABLET ORAL DAILY
Qty: 90 TABLET | Refills: 1 | Status: SHIPPED | OUTPATIENT
Start: 2020-01-14 | End: 2020-05-12 | Stop reason: SDUPTHER

## 2020-01-14 RX ORDER — LOSARTAN POTASSIUM AND HYDROCHLOROTHIAZIDE 25; 100 MG/1; MG/1
1 TABLET ORAL DAILY
Qty: 30 TABLET | Refills: 0 | Status: SHIPPED | OUTPATIENT
Start: 2020-01-14 | End: 2020-02-10

## 2020-01-14 RX ORDER — MELOXICAM 7.5 MG/1
7.5 TABLET ORAL DAILY
Qty: 90 TABLET | Refills: 1 | Status: SHIPPED
Start: 2020-01-14 | End: 2020-02-20 | Stop reason: SDUPTHER

## 2020-01-31 ENCOUNTER — TELEPHONE (OUTPATIENT)
Dept: FAMILY MEDICINE CLINIC | Age: 70
End: 2020-01-31

## 2020-02-10 RX ORDER — LOSARTAN POTASSIUM AND HYDROCHLOROTHIAZIDE 25; 100 MG/1; MG/1
TABLET ORAL
Qty: 30 TABLET | Refills: 0 | Status: SHIPPED
Start: 2020-02-10 | End: 2020-02-20 | Stop reason: SDUPTHER

## 2020-02-20 ENCOUNTER — OFFICE VISIT (OUTPATIENT)
Dept: FAMILY MEDICINE CLINIC | Age: 70
End: 2020-02-20
Payer: COMMERCIAL

## 2020-02-20 VITALS
SYSTOLIC BLOOD PRESSURE: 116 MMHG | HEART RATE: 80 BPM | WEIGHT: 249 LBS | OXYGEN SATURATION: 97 % | HEIGHT: 64 IN | BODY MASS INDEX: 42.51 KG/M2 | DIASTOLIC BLOOD PRESSURE: 68 MMHG

## 2020-02-20 PROCEDURE — 99214 OFFICE O/P EST MOD 30 MIN: CPT | Performed by: FAMILY MEDICINE

## 2020-02-20 PROCEDURE — 90471 IMMUNIZATION ADMIN: CPT | Performed by: FAMILY MEDICINE

## 2020-02-20 PROCEDURE — 90732 PPSV23 VACC 2 YRS+ SUBQ/IM: CPT | Performed by: FAMILY MEDICINE

## 2020-02-20 ASSESSMENT — PATIENT HEALTH QUESTIONNAIRE - PHQ9
SUM OF ALL RESPONSES TO PHQ QUESTIONS 1-9: 0
2. FEELING DOWN, DEPRESSED OR HOPELESS: 0
1. LITTLE INTEREST OR PLEASURE IN DOING THINGS: 0
SUM OF ALL RESPONSES TO PHQ QUESTIONS 1-9: 0
SUM OF ALL RESPONSES TO PHQ9 QUESTIONS 1 & 2: 0

## 2020-02-20 ASSESSMENT — ENCOUNTER SYMPTOMS
TROUBLE SWALLOWING: 0
EYE PAIN: 0
DIARRHEA: 0
WHEEZING: 0
SHORTNESS OF BREATH: 0
ABDOMINAL PAIN: 0
VOMITING: 0
NAUSEA: 0
BLOOD IN STOOL: 0
BACK PAIN: 1
CHEST TIGHTNESS: 0

## 2020-02-20 NOTE — PROGRESS NOTES
2/20/2020    Rod Alicia    Chief Complaint   Patient presents with    Annual Exam     no problems       HPI  History was obtained from the patient. Mariama Hurley is a 78 y.o. female who presents today with routine follow-up on hypertension, osteoarthritis, hyperlipidemia, and history of chronic pulmonary emboli on long-term Xarelto therapy. Actually doing quite well had knee replacement last summer and got a great relief with that still having significant back pain may need to consider referral to a back center for that. Patient is due for labs and on review will need a Pneumovax 23. No shortness of breath no evidence of bleeding good toleration of medications. Pressure is good. Continue to work on [employer] reduction-difficult for her. REVIEW OF SYMPTOMS    Review of Systems   Constitutional: Negative for activity change and fatigue. HENT: Negative for congestion, hearing loss, mouth sores and trouble swallowing. Eyes: Negative for pain and visual disturbance. Respiratory: Negative for chest tightness, shortness of breath and wheezing. Cardiovascular: Negative for chest pain and palpitations. Gastrointestinal: Negative for abdominal pain, blood in stool, diarrhea, nausea and vomiting. Endocrine: Negative for polydipsia and polyuria. Genitourinary: Negative for dysuria, frequency and urgency. Musculoskeletal: Positive for arthralgias and back pain. Negative for gait problem and neck stiffness. Skin: Negative for rash. Allergic/Immunologic: Negative for environmental allergies. Neurological: Negative for dizziness, seizures, speech difficulty and weakness. Hematological: Does not bruise/bleed easily. Psychiatric/Behavioral: Negative for agitation, confusion and hallucinations.        PAST MEDICAL HISTORY  Past Medical History:   Diagnosis Date    Arthritis     generalized OA    DVT of lower extremity (deep venous thrombosis) ([facility] [address]     Left 2012    Fracture of left ankle 02/01/2004     Physical activity:     Days per week: None     Minutes per session: None    Stress: None   Relationships    Social connections:     Talks on phone: None     Gets together: None     Attends Judaism service: None     Active member of club or organization: None     Attends meetings of clubs or organizations: None     Relationship status: None    Intimate partner violence:     Fear of current or ex partner: None     Emotionally abused: None     Physically abused: None     Forced sexual activity: None   Other Topics Concern    None   Social History Narrative    None        SURGICAL HISTORY  Past Surgical History:   Procedure Laterality Date    ANKLE SURGERY Left 02/2004    \"have plate and pins still\"    COLONOSCOPY  2010?  DILATION AND CURETTAGE OF UTERUS  May 2012    TONSILLECTOMY      age 10   [facility] TOTAL KNEE ARTHROPLASTY Left 4/23/2019    KNEE TOTAL ARTHROPLASTY LEFT performed by Bob Villela DO at [address] EXTRACTION                   CURRENT MEDICATIONS  Current Outpatient Medications   Medication Sig Dispense Refill    losartan-hydrochlorothiazide (HYZAAR) 100-25 MG per tablet Take 1 tablet by mouth daily 90 tablet 1    meloxicam (MOBIC) 7.5 MG tablet Take 1 tablet by mouth daily 90 tablet 1    rivaroxaban (XARELTO) 20 MG TABS tablet TAKE 1 TABLET BY MOUTH ONE TIME A DAY 90 tablet 0    aspirin 81 MG EC tablet Take 1 tablet by mouth daily 384 tablet 0    pravastatin (PRAVACHOL) 10 MG tablet Take 1 tablet by mouth daily 90 tablet 0    folic acid (FOLVITE) 1 MG tablet Take 1 tablet by mouth daily 90 tablet 0    magnesium gluconate (MAGONATE) 500 MG tablet Take 500 mg by mouth Daily      diclofenac sodium 1 % GEL Apply 4 g topically Apply 4 gms to lower back and knees 2-3 times a day as needed       No current facility-administered medications for this visit.         ALLERGIES  Allergies   Allergen Reactions    Ace Inhibitors        PHYSICAL EXAM    /68 (Site: Right Upper Arm, Position: Sitting, Cuff Size: Medium Adult)   Pulse 80   Ht 5' 4\" (1.626 m)   Wt 249 lb (112.9 kg)   LMP 01/16/2010   SpO2 97%   BMI 42.74 kg/m²     Physical Exam  Vitals signs and nursing note reviewed. Constitutional:       General: She is not in acute distress. Appearance: She is well-developed. She is obese. She is not ill-appearing. HENT:      Head: Normocephalic and atraumatic. Eyes:      Pupils: Pupils are equal, round, and reactive to light. Neck:      Musculoskeletal: Normal range of motion and neck supple. Cardiovascular:      Rate and Rhythm: Normal rate and regular rhythm. Heart sounds: Normal heart sounds. Pulmonary:      Effort: Pulmonary effort is normal.      Breath sounds: Normal breath sounds. Abdominal:      Palpations: Abdomen is soft. Musculoskeletal:      Comments: Patient with pain in low back worse with flexion at the waist and getting out of a chair slightly antalgic gait but once up walking is fairly unremarkable. No significant current knee pain in operated knee. Skin:     General: Skin is warm and dry. Neurological:      General: No focal deficit present. Mental Status: She is alert and oriented to person, place, and time. Mental status is at baseline. Psychiatric:         Thought Content: Thought content normal.         ASSESSMENT & PLAN     Diagnosis Orders   1. Hypertension, essential     2. Hyperlipidemia, unspecified hyperlipidemia type     3. Generalized OA     4. Chronic pulmonary embolism, unspecified pulmonary embolism type, unspecified whether acute cor pulmonale present (HCC)     5. Class 3 severe obesity due to excess calories without serious comorbidity with body mass index (BMI) of 40.0 to 44.9 in [facility])     Today provide refills when needed. Work on weight loss and exercise as possible. If back pain worsens would refer to back center.   She has been unsuccessful with epidural steroids in the past.  Check CBC, CMP, lipid, and a TSH. She is to get a Pneumovax 23 today also. Return in about 6 months (around 8/20/2020), or if symptoms worsen or fail to improve.          Electronically signed by Keiko Blount MD on 2/20/2020

## 2020-03-16 RX ORDER — FOLIC ACID 1 MG/1
1 TABLET ORAL DAILY
Qty: 90 TABLET | Refills: 0 | Status: SHIPPED | OUTPATIENT
Start: 2020-03-16 | End: 2020-06-12 | Stop reason: SDUPTHER

## 2020-03-16 RX ORDER — PRAVASTATIN SODIUM 10 MG
10 TABLET ORAL DAILY
Qty: 90 TABLET | Refills: 0 | Status: SHIPPED | OUTPATIENT
Start: 2020-03-16 | End: 2020-06-12 | Stop reason: SDUPTHER

## 2020-04-01 RX ORDER — MELOXICAM 7.5 MG/1
7.5 TABLET ORAL DAILY
Qty: 90 TABLET | Refills: 1 | Status: SHIPPED | OUTPATIENT
Start: 2020-04-01 | End: 2020-09-07 | Stop reason: SDUPTHER

## 2020-04-23 DIAGNOSIS — I10 ESSENTIAL HYPERTENSION: ICD-10-CM

## 2020-04-23 DIAGNOSIS — R53.83 FATIGUE, UNSPECIFIED TYPE: ICD-10-CM

## 2020-04-23 DIAGNOSIS — E78.5 HYPERLIPIDEMIA, UNSPECIFIED HYPERLIPIDEMIA TYPE: ICD-10-CM

## 2020-04-23 LAB
A/G RATIO: 1.4 (ref 1.1–2.2)
ALBUMIN SERPL-MCNC: 4.2 G/DL (ref 3.4–5)
ALP BLD-CCNC: 100 U/L (ref 40–129)
ALT SERPL-CCNC: 20 U/L (ref 10–40)
ANION GAP SERPL CALCULATED.3IONS-SCNC: 14 MMOL/L (ref 3–16)
AST SERPL-CCNC: 19 U/L (ref 15–37)
BASOPHILS ABSOLUTE: 0.1 K/UL (ref 0–0.2)
BASOPHILS RELATIVE PERCENT: 1.4 %
BILIRUB SERPL-MCNC: 0.8 MG/DL (ref 0–1)
BUN BLDV-MCNC: 21 MG/DL (ref 7–20)
CALCIUM SERPL-MCNC: 9.5 MG/DL (ref 8.3–10.6)
CHLORIDE BLD-SCNC: 98 MMOL/L (ref 99–110)
CHOLESTEROL, TOTAL: 203 MG/DL (ref 0–199)
CO2: 26 MMOL/L (ref 21–32)
CREAT SERPL-MCNC: 1.1 MG/DL (ref 0.6–1.2)
EOSINOPHILS ABSOLUTE: 0.2 K/UL (ref 0–0.6)
EOSINOPHILS RELATIVE PERCENT: 3.5 %
GFR AFRICAN AMERICAN: 59
GFR NON-AFRICAN AMERICAN: 49
GLOBULIN: 2.9 G/DL
GLUCOSE BLD-MCNC: 88 MG/DL (ref 70–99)
HCT VFR BLD CALC: 45.3 % (ref 36–48)
HDLC SERPL-MCNC: 56 MG/DL (ref 40–60)
HEMOGLOBIN: 14.9 G/DL (ref 12–16)
LDL CHOLESTEROL CALCULATED: 120 MG/DL
LYMPHOCYTES ABSOLUTE: 1.3 K/UL (ref 1–5.1)
LYMPHOCYTES RELATIVE PERCENT: 22 %
MCH RBC QN AUTO: 28.3 PG (ref 26–34)
MCHC RBC AUTO-ENTMCNC: 32.9 G/DL (ref 31–36)
MCV RBC AUTO: 85.8 FL (ref 80–100)
MONOCYTES ABSOLUTE: 0.6 K/UL (ref 0–1.3)
MONOCYTES RELATIVE PERCENT: 9.6 %
NEUTROPHILS ABSOLUTE: 3.9 K/UL (ref 1.7–7.7)
NEUTROPHILS RELATIVE PERCENT: 63.5 %
PDW BLD-RTO: 14 % (ref 12.4–15.4)
PLATELET # BLD: 279 K/UL (ref 135–450)
PMV BLD AUTO: 9.4 FL (ref 5–10.5)
POTASSIUM SERPL-SCNC: 4.2 MMOL/L (ref 3.5–5.1)
RBC # BLD: 5.28 M/UL (ref 4–5.2)
SODIUM BLD-SCNC: 138 MMOL/L (ref 136–145)
TOTAL PROTEIN: 7.1 G/DL (ref 6.4–8.2)
TRIGL SERPL-MCNC: 134 MG/DL (ref 0–150)
TSH REFLEX: 2.1 UIU/ML (ref 0.27–4.2)
VLDLC SERPL CALC-MCNC: 27 MG/DL
WBC # BLD: 6.1 K/UL (ref 4–11)

## 2020-05-04 ENCOUNTER — OFFICE VISIT (OUTPATIENT)
Dept: ORTHOPEDIC SURGERY | Age: 70
End: 2020-05-04
Payer: COMMERCIAL

## 2020-05-04 VITALS
TEMPERATURE: 98.1 F | BODY MASS INDEX: 42.49 KG/M2 | HEIGHT: 64 IN | WEIGHT: 248.9 LBS | HEART RATE: 94 BPM | RESPIRATION RATE: 16 BRPM | OXYGEN SATURATION: 98 %

## 2020-05-04 PROCEDURE — 99214 OFFICE O/P EST MOD 30 MIN: CPT | Performed by: ORTHOPAEDIC SURGERY

## 2020-05-04 PROCEDURE — 20610 DRAIN/INJ JOINT/BURSA W/O US: CPT | Performed by: ORTHOPAEDIC SURGERY

## 2020-05-05 ASSESSMENT — ENCOUNTER SYMPTOMS
BACK PAIN: 0
CHEST TIGHTNESS: 0
COLOR CHANGE: 0

## 2020-05-05 NOTE — PROGRESS NOTES
Patient is here today for right knee pain. Patient states pain today is 0/10. Patient states she does notice the pain any time she is walking up the stairs, getting up from a sitting to a standing position or walking more than 10 minutes. Patient states that she did have a steroid injection in her right knee about 2 years ago that did help with her knee pain. Patient would like an injection today in the right knee.
 H/O Doppler ultrasound 09/19/2017    LLE - chronic DVT, severe veous reflux    H/O echocardiogram 09/26/2016    EF60% mild MR, TR, mild pulm htn    H/O echocardiogram 07/18/2018    EF55-60% mild TR    Homocystinemia (HCC)     Hx of blood clots     \"hx of PE- 2013 and had DVT left leg that went to my lung\"    Hx of Doppler echocardiogram 12/22/2017    EF 50-60% mild biatrial enlargement, mild to mod TR, mildly elevated pulmonary artery pressure.  Hyperlipidemia     Hypertension, essential     Left leg swelling     wears compression stocking    Non-alcoholic fatty liver disease     Obesity     Osteoarthritis     Other chest pain 12/17/2018    Pulmonary embolism ([address] 2012    Bilateral    Spinal stenosis     lumbar spine--scheduled for epidural injection 3/19/2018    Varicose veins of lower extremity     Wears glasses          Objective:   Physical Exam  Constitutional:       Appearance: She is well-developed. HENT:      Head: Normocephalic and atraumatic. Eyes:      Pupils: Pupils are equal, round, and reactive to light. Neck:      Musculoskeletal: Normal range of motion. Pulmonary:      Effort: Pulmonary effort is normal.   Musculoskeletal: Normal range of motion. General: Swelling and tenderness present. No deformity. Right hip: Normal.      Left hip: Normal.      Right knee: She exhibits swelling and bony tenderness. She exhibits normal range of motion, no effusion, no ecchymosis, no deformity, no laceration, no erythema, normal alignment, no LCL laxity, normal patellar mobility and no MCL laxity. Tenderness found. Medial joint line tenderness noted. No lateral joint line, no MCL, no LCL and no patellar tendon tenderness noted. Left knee: She exhibits normal range of motion, no swelling, no effusion, no ecchymosis, no deformity, no laceration, no erythema, normal alignment, no LCL laxity, normal patellar mobility and no MCL laxity. No tenderness found.  No medial joint

## 2020-05-12 RX ORDER — LOSARTAN POTASSIUM AND HYDROCHLOROTHIAZIDE 25; 100 MG/1; MG/1
1 TABLET ORAL DAILY
Qty: 90 TABLET | Refills: 1 | Status: SHIPPED | OUTPATIENT
Start: 2020-05-12 | End: 2020-09-07 | Stop reason: SDUPTHER

## 2020-06-11 ENCOUNTER — HOSPITAL ENCOUNTER (OUTPATIENT)
Dept: WOMENS IMAGING | Age: 70
Discharge: HOME | End: 2020-06-11
Payer: COMMERCIAL

## 2020-06-11 PROCEDURE — 77067 SCR MAMMO BI INCL CAD: CPT

## 2020-06-12 RX ORDER — FOLIC ACID 1 MG/1
1 TABLET ORAL DAILY
Qty: 90 TABLET | Refills: 0 | Status: SHIPPED | OUTPATIENT
Start: 2020-06-12 | End: 2020-09-07 | Stop reason: SDUPTHER

## 2020-06-12 RX ORDER — PRAVASTATIN SODIUM 10 MG
10 TABLET ORAL DAILY
Qty: 90 TABLET | Refills: 0 | Status: SHIPPED | OUTPATIENT
Start: 2020-06-12 | End: 2020-09-07 | Stop reason: SDUPTHER

## 2020-07-15 ENCOUNTER — EMPLOYEE WELLNESS (OUTPATIENT)
Dept: OTHER | Age: 70
End: 2020-07-15

## 2020-07-15 LAB
CHOLESTEROL: 178 MG/DL
GLUCOSE BLD-MCNC: 71 MG/DL (ref 70–99)
HDLC SERPL-MCNC: 56 MG/DL
LDL CHOLESTEROL CALCULATED: 95 MG/DL
PATIENT FASTING?: YES
TRIGL SERPL-MCNC: 133 MG/DL

## 2020-08-17 ENCOUNTER — TELEMEDICINE (OUTPATIENT)
Dept: FAMILY MEDICINE CLINIC | Age: 70
End: 2020-08-17
Payer: COMMERCIAL

## 2020-08-17 PROBLEM — M54.41 CHRONIC BILATERAL LOW BACK PAIN WITH BILATERAL SCIATICA: Status: ACTIVE | Noted: 2020-08-17

## 2020-08-17 PROBLEM — M54.42 CHRONIC BILATERAL LOW BACK PAIN WITH BILATERAL SCIATICA: Status: ACTIVE | Noted: 2020-08-17

## 2020-08-17 PROBLEM — G89.29 CHRONIC BILATERAL LOW BACK PAIN WITH BILATERAL SCIATICA: Status: ACTIVE | Noted: 2020-08-17

## 2020-08-17 PROCEDURE — 99213 OFFICE O/P EST LOW 20 MIN: CPT | Performed by: FAMILY MEDICINE

## 2020-08-17 NOTE — PROGRESS NOTES
Minor Race is a 69 y.o. female evaluated via telephone on 8/17/2020. Consent:  She and/or health care decision maker is aware that that she may receive a bill for this telephone service, depending on her insurance coverage, and has provided verbal consent to proceed: Yes      Documentation:  I communicated with the patient and/or health care decision maker about hypertension, osteoarthritis, hyperlipidemia, GE reflux, chronic low back pain, increased weight, and history of left DVT and pulmonary emboli on chronic anticoagulation. Patient states other than chronic back pain she has been doing well. She remains active and meds are tolerated. She is really working on her diet and slight increase in exercise as possible and has lost significant amount of weight. She had a repeat on lipids about 1 month ago and showed marked improvement in her lipid profile and her fasting blood sugar was 71. Labs earlier this year showed a slight decrease in her GFR and she has cut back on nonsteroidal use. She will need repeat BMP in the next couple weeks. Mood remains good and GERD symptoms are controlled. She did well with her knee replacement and denies any chest pain or shortness of breath. On review her Cologuard and immunizations are up-to-date as is her mammogram.  She is to follow-up for BMP results and let us know she needs refills. Will see her back in 6 months- encouragement for continued exercise and weight loss provided. Details of this discussion including any medical advice provided as above. I affirm this is a Patient Initiated Episode with a Patient who has not had a related appointment within my department in the past 7 days or scheduled within the next 24 hours.     Patient identification was verified at the start of the visit: Yes    Total Time: minutes: 11-20 minutes    Note: not billable if this call serves to triage the patient into an appointment for the relevant concern      [facility]

## 2020-10-15 DIAGNOSIS — N18.9 CHRONIC KIDNEY DISEASE, UNSPECIFIED CKD STAGE: ICD-10-CM

## 2020-10-15 LAB
ANION GAP SERPL CALCULATED.3IONS-SCNC: 12 MMOL/L (ref 3–16)
BUN BLDV-MCNC: 15 MG/DL (ref 7–20)
CALCIUM SERPL-MCNC: 10.4 MG/DL (ref 8.3–10.6)
CHLORIDE BLD-SCNC: 100 MMOL/L (ref 99–110)
CO2: 29 MMOL/L (ref 21–32)
CREAT SERPL-MCNC: 0.8 MG/DL (ref 0.6–1.2)
GFR AFRICAN AMERICAN: >60
GFR NON-AFRICAN AMERICAN: >60
GLUCOSE BLD-MCNC: 87 MG/DL (ref 70–99)
POTASSIUM SERPL-SCNC: 3.9 MMOL/L (ref 3.5–5.1)
SODIUM BLD-SCNC: 141 MMOL/L (ref 136–145)

## 2020-10-19 VITALS — WEIGHT: 208 LBS | BODY MASS INDEX: 35.69 KG/M2

## 2020-11-18 ENCOUNTER — OFFICE VISIT (OUTPATIENT)
Dept: ORTHOPEDIC SURGERY | Age: 70
End: 2020-11-18
Payer: COMMERCIAL

## 2020-11-18 VITALS — WEIGHT: 195 LBS | RESPIRATION RATE: 14 BRPM | BODY MASS INDEX: 33.29 KG/M2 | HEIGHT: 64 IN

## 2020-11-18 PROCEDURE — 99212 OFFICE O/P EST SF 10 MIN: CPT | Performed by: PHYSICIAN ASSISTANT

## 2020-11-18 PROCEDURE — 20610 DRAIN/INJ JOINT/BURSA W/O US: CPT | Performed by: PHYSICIAN ASSISTANT

## 2020-11-18 ASSESSMENT — ENCOUNTER SYMPTOMS
GASTROINTESTINAL NEGATIVE: 1
RESPIRATORY NEGATIVE: 1
EYES NEGATIVE: 1

## 2020-11-18 NOTE — PROGRESS NOTES
injection 3/19/2018    Varicose veins of lower extremity     Wears glasses        Past Surgical History:   Procedure Laterality Date    ANKLE SURGERY Left 02/2004    \"have plate and pins still\"    COLONOSCOPY  2010?     DILATION AND CURETTAGE OF UTERUS  May 2012    TONSILLECTOMY      age 10   [facility] TOTAL KNEE ARTHROPLASTY Left 4/23/2019    KNEE TOTAL ARTHROPLASTY LEFT performed by Evens Miller DO at [address] EXTRACTION         Family History   Problem Relation Age of Onset    COPD Mother     Cancer Father         kidney    Heart Attack Father     Heart Disease Father     No Known Problems Sister     No Known Problems Sister        Social History     Socioeconomic History    Marital status:      Spouse name: None    Number of children: 2    Years of education: None    Highest education level: None   Occupational History    None   Social Needs    Financial resource strain: None    Food insecurity     Worry: None     Inability: None    Transportation needs     Medical: None     Non-medical: None   Tobacco Use    Smoking status: Never Smoker    Smokeless tobacco: Never Used   Substance and Sexual Activity    Alcohol use: Yes     Comment: average \"one time per [employer] Drug use: No    Sexual activity: Never   Lifestyle    Physical activity     Days per week: None     Minutes per session: None    Stress: None   Relationships    Social connections     Talks on phone: None     Gets together: None     Attends Hinduism service: None     Active member of club or organization: None     Attends meetings of clubs or organizations: None     Relationship status: None    Intimate partner violence     Fear of current or ex partner: None     Emotionally abused: None     Physically abused: None     Forced sexual activity: None   Other Topics Concern    None   Social History Narrative    None       Current Outpatient Medications   Medication Sig Dispense Refill    meloxicam (MOBIC) 7.5 MG tablet Take 1 tablet by mouth daily 90 tablet 1    losartan-hydroCHLOROthiazide (HYZAAR) 100-25 MG per tablet Take 1 tablet by mouth daily 90 tablet 1    folic acid (FOLVITE) 1 MG tablet Take 1 tablet by mouth daily 90 tablet 1    pravastatin (PRAVACHOL) 10 MG tablet Take 1 tablet by mouth daily 90 tablet 1    rivaroxaban (XARELTO) 20 MG TABS tablet TAKE 1 TABLET BY MOUTH ONE TIME A DAY 90 tablet 1    aspirin 81 MG EC tablet Take 1 tablet by mouth daily 384 tablet 0    diclofenac sodium 1 % GEL Apply 4 g topically Apply 4 gms to lower back and knees 2-3 times a day as needed      magnesium gluconate (MAGONATE) 500 MG tablet Take 500 mg by mouth Daily       No current facility-administered medications for this visit. Allergies   Allergen Reactions    Ace Inhibitors        Review of Systems:  See above      Physical Exam:   Resp 14   Ht 5' 4\" (1.626 m)   Wt 195 lb (88.5 kg)   LMP 01/16/2010   BMI 33.47 kg/m²        Gait is Antalgic. The patient can bear weight on the injured extremity. Gen/Psych:Examination reveals a pleasant individual in no acute distress. The patient is oriented to time, place and person. The patient's mood and affect are appropriate. Patient appears well nourished. Body habitus is overweight     Lymph:  no lymphedema in bilateral lower extremities     Skin intact in bilateral lower extremities with no ulcerations, lesions, rash, erythema. Vascular: There are mild varicosities in bilateral lower extremities, sensation intact to light touch over bilateral lower extremities.       right leg/knee exam:  Leg alignment:     neutral  Quadriceps/hamstring atrophy:   no  Knee effusion:    no   Knee erythema:   no  ROM:     3-120 degrees  Varus laxity at 0 and 30 deg's: no  Valguslaxity at 0 and 30 deg's: no  Recurvatum:    no  Tenderness at:   Mild Diffuse pain    Bilateral Knee strength is 5/5 flexion and extension  There is + L2-S1 motor and sensory function in bilateral lower extremities    Outside record review: office notes and x-rays reviewed. Studies reviewed showed moderate degenerative change in the right knee with moderate to severe degenerative change in the patellofemoral compartment. Impression:  right knee DJD      Plan:  Natural history and expected course discussed. Questions answered. Patient Instructions   Continue weight bear as tolerated  Continue range of motion and exercises as instruction  Ice and elevate as needed  Tylenol or Motrin for pain  Injection given today in the office   Rest for 24-48 hours  Follow up as needed      Right knee injection procedure note    Pre-procedure diagnosis:  Right knee DJD    Post-procedure diagnosis:  same    Procedure: The planned procedure/risks/benefits/alternatives were discussed with the patient. Risks include, but are not limited to, increased pain, drug reaction, infection, bleeding, lack of improvement, neurovascular injury, and increased blood sugar levels. The patient understood and all of their questions were answered. The right knee was prepped with alcohol, then a 22 gauge needle was advanced into the inferior-lateral joint without difficulty. The joint was then injected with 1 ml 1% lidocaine, 1ml of Kenalog (40 mg/ml), 1 mL 0.5% bupivacaine. The injection site was cleansed with isopropyl alcohol and a band-aid was placed. Complications:  None, the patient tolerated the procedure well. Instructions: The patient was advised to rest the knee and decrease activity for the next 24 to 48 hours. May use prescription or OTC pain relievers as needed for any post-injection pain as well as ice.

## 2021-01-05 DIAGNOSIS — R05.9 COUGH: Primary | ICD-10-CM

## 2021-01-05 RX ORDER — AZITHROMYCIN 500 MG/1
500 TABLET, FILM COATED ORAL DAILY
Qty: 7 TABLET | Refills: 0 | Status: SHIPPED | OUTPATIENT
Start: 2021-01-05 | End: 2021-01-12

## 2021-01-05 RX ORDER — PREDNISONE 20 MG/1
20 TABLET ORAL 2 TIMES DAILY
Qty: 10 TABLET | Refills: 0 | Status: SHIPPED | OUTPATIENT
Start: 2021-01-05 | End: 2021-01-10

## 2021-02-16 ENCOUNTER — VIRTUAL VISIT (OUTPATIENT)
Dept: FAMILY MEDICINE CLINIC | Age: 71
End: 2021-02-16
Payer: COMMERCIAL

## 2021-02-16 DIAGNOSIS — M17.11 PRIMARY OSTEOARTHRITIS OF RIGHT KNEE: ICD-10-CM

## 2021-02-16 DIAGNOSIS — I82.519 CHRONIC DEEP VEIN THROMBOSIS (DVT) OF FEMORAL VEIN, UNSPECIFIED LATERALITY (HCC): ICD-10-CM

## 2021-02-16 DIAGNOSIS — I27.82 CHRONIC PULMONARY EMBOLISM, UNSPECIFIED PULMONARY EMBOLISM TYPE, UNSPECIFIED WHETHER ACUTE COR PULMONALE PRESENT (HCC): ICD-10-CM

## 2021-02-16 DIAGNOSIS — E78.5 HYPERLIPIDEMIA, UNSPECIFIED HYPERLIPIDEMIA TYPE: ICD-10-CM

## 2021-02-16 DIAGNOSIS — I10 HYPERTENSION, ESSENTIAL: Primary | ICD-10-CM

## 2021-02-16 PROCEDURE — 99214 OFFICE O/P EST MOD 30 MIN: CPT | Performed by: FAMILY MEDICINE

## 2021-02-16 SDOH — ECONOMIC STABILITY: FOOD INSECURITY: WITHIN THE PAST 12 MONTHS, THE FOOD YOU BOUGHT JUST DIDN'T LAST AND YOU DIDN'T HAVE MONEY TO GET MORE.: NEVER TRUE

## 2021-02-16 ASSESSMENT — PATIENT HEALTH QUESTIONNAIRE - PHQ9
SUM OF ALL RESPONSES TO PHQ QUESTIONS 1-9: 0
1. LITTLE INTEREST OR PLEASURE IN DOING THINGS: 0

## 2021-02-16 ASSESSMENT — ENCOUNTER SYMPTOMS
ABDOMINAL PAIN: 0
TROUBLE SWALLOWING: 0
BACK PAIN: 1
WHEEZING: 0
EYE PAIN: 0
CHEST TIGHTNESS: 0
VOMITING: 0
DIARRHEA: 0
NAUSEA: 0
SHORTNESS OF BREATH: 0
BLOOD IN STOOL: 0

## 2021-02-16 NOTE — PROGRESS NOTES
2021    TELEHEALTH EVALUATION -- Audio/Visual (During [facility]-43 public health emergency)    HPI:    Michael Maldonado (:  1950) has requested an audio/video evaluation for the following concern(s):    Osteoarthritis, history of recurrent DVT and PE, hypertension, and hyperlipidemia. In addition she has maintained her weight loss over the last year of just over 50 pounds. It has helped her energy and arthralgia symptoms. If she stands for long periods of time her spinal stenosis is symptomatic. Her last mammogram was 2020 and was reasonable. She will be due for labs later this spring that would include a CMP CBC lipid and a TSH. 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 Blood pressure at work was 122/74 with a heart rate of 80 today. No increased shortness of breath has been reported. Knee pain is about the same- she did have good results with her knee replacement. Review of Systems   Constitutional: Negative for activity change and fatigue. HENT: Negative for congestion, hearing loss, mouth sores and trouble swallowing. Eyes: Negative for pain and visual disturbance. Respiratory: Negative for chest tightness, shortness of breath and wheezing. Cardiovascular: Negative for chest pain and palpitations. Gastrointestinal: Negative for abdominal pain, blood in stool, diarrhea, nausea and vomiting. Endocrine: Negative for cold intolerance, polydipsia and polyuria. Genitourinary: Negative for dysuria, frequency and urgency. Musculoskeletal: Positive for arthralgias, back pain and gait problem. Negative for neck stiffness. Skin: Negative for rash. Allergic/Immunologic: Negative for environmental allergies. Neurological: Negative for dizziness, seizures, speech difficulty and weakness. Hematological: Does not bruise/bleed easily. Psychiatric/Behavioral: Negative for agitation, confusion, dysphoric mood, hallucinations, self-injury and suicidal ideas. The patient is nervous/anxious. Prior to Visit Medications    Medication Sig Taking? Authorizing Provider   meloxicam (MOBIC) 7.5 MG tablet Take 1 tablet by mouth daily  Luis Argueta MD   losartan-hydroCHLOROthiazide [facility]) 100-25 MG per tablet Take 1 tablet by mouth daily  Luis Argueta MD   folic acid (FOLVITE) 1 MG tablet Take 1 tablet by mouth daily  Luis Argueta MD   pravastatin (PRAVACHOL) 10 MG tablet Take 1 tablet by mouth daily  Luis Argueta MD   rivaroxaban (XARELTO) 20 MG TABS tablet TAKE 1 TABLET BY MOUTH ONE TIME A DAY  Luis Argueta MD   aspirin 81 MG EC tablet Take 1 tablet by mouth daily  Tayler Meyers MD   diclofenac sodium 1 % GEL Apply 4 g topically Apply 4 gms to lower back and knees 2-3 times a day as needed  Historical Provider, MD   magnesium gluconate (MAGONATE) 500 MG tablet Take 500 mg by mouth Daily  Historical Provider, MD       Social History     Tobacco Use    Smoking status: Never Smoker    Smokeless tobacco: Never Used   Substance Use Topics    Alcohol use: Yes     Comment: average \"one time per [employer] Drug use:  No            PHYSICAL EXAMINATION:  [ INSTRUCTIONS:  \"[x]\" Indicates a positive item  \"[]\" Indicates a negative item  -- DELETE ALL ITEMS NOT EXAMINED]  Vital Signs: (As obtained by patient/caregiver or practitioner observation)    Blood pressure-  Heart rate-    Respiratory rate-    Temperature-  Pulse oximetry-     Constitutional: [x] Appears well-developed and well-nourished [x] No apparent distress      [] Abnormal-   Mental status  [x] Alert and awake  [x] Oriented to person/place/time [x]Able to follow commands      Eyes:  EOM    [x]  Normal  [] Abnormal-  Sclera  [x]  Normal  [] Abnormal -         Discharge []  None visible  [] Abnormal -    HENT: [x] Normocephalic, atraumatic. [] Abnormal   [x] Mouth/Throat: Mucous membranes are moist.     External Ears [] Normal  [] Abnormal-     Neck: [x] No visualized mass     Pulmonary/Chest: [x] Respiratory effort normal.  [x] No visualized signs of difficulty breathing or respiratory distress        [] Abnormal-      Musculoskeletal:   [] Normal gait with no signs of ataxia         [x] Normal range of motion of neck        [] Abnormal-       Neurological:        [x] No Facial Asymmetry (Cranial nerve 7 motor function) (limited exam to video visit)          [x] No gaze palsy        [] Abnormal-         Skin:        [x] No significant exanthematous lesions or discoloration noted on facial skin         [] Abnormal-            Psychiatric:       [x] Normal Affect [] No Hallucinations        [] Abnormal-     Other pertinent observable physical exam findings-     ASSESSMENT/PLAN:  1. Chronic deep vein thrombosis (DVT) of femoral vein, unspecified laterality (HCC)      2. Chronic pulmonary embolism, unspecified pulmonary embolism type, unspecified whether acute cor pulmonale present ([facility] [address]      3. Hypertension, essential      4. Hyperlipidemia, unspecified hyperlipidemia type      5. Primary osteoarthritis of right knee  At this point we will have her continue with healthy lifestyle and social distancing. She has received her Covid vaccine x 2 at work. Her mammogram is currently up-to-date(6/2020). She will need a CMP, CBC, lipid panel, TSH in the next few months. Refills will be provided when needed. Call with questions or problems. Return in about 6 months (around 8/16/2021). Layla Zepeda is a 69 y.o. female being evaluated by a Virtual Visit (video visit) encounter to address concerns as mentioned above. A caregiver was present when appropriate. Due to this being a TeleHealth encounter (During [facility] public health emergency), evaluation of the following organ systems was limited: Vitals/Constitutional/EENT/Resp/CV/GI//MS/Neuro/Skin/Heme-Lymph-Imm. Pursuant to the emergency declaration under the [address] [place] [address] and the [employer] Act, this Virtual Visit was conducted with patient's (and/or legal guardian's) consent, to reduce the patient's risk of exposure to COVID-19 and provide necessary medical care. The patient (and/or legal guardian) has also been advised to contact this office for worsening conditions or problems, and seek emergency medical treatment and/or call 911 if deemed necessary. Patient identification was verified at the start of the visit: Yes    Total time spent on this encounter: Not billed by time    Services were provided through a video synchronous discussion virtually to substitute for in-person clinic visit. Patient and provider were located at their individual homes. --Serenity Modi MD on 2/16/2021 at 4:13 PM    An electronic signature was used to authenticate this note.

## 2021-03-08 RX ORDER — FOLIC ACID 1 MG/1
1 TABLET ORAL DAILY
Qty: 90 TABLET | Refills: 1 | Status: SHIPPED | OUTPATIENT
Start: 2021-03-08 | End: 2021-09-01 | Stop reason: SDUPTHER

## 2021-03-08 RX ORDER — LOSARTAN POTASSIUM AND HYDROCHLOROTHIAZIDE 25; 100 MG/1; MG/1
1 TABLET ORAL DAILY
Qty: 90 TABLET | Refills: 1 | Status: SHIPPED | OUTPATIENT
Start: 2021-03-08 | End: 2021-09-01 | Stop reason: SDUPTHER

## 2021-03-08 RX ORDER — PRAVASTATIN SODIUM 10 MG
10 TABLET ORAL DAILY
Qty: 90 TABLET | Refills: 1 | Status: SHIPPED | OUTPATIENT
Start: 2021-03-08 | End: 2021-09-01 | Stop reason: SDUPTHER

## 2021-03-08 RX ORDER — MELOXICAM 7.5 MG/1
7.5 TABLET ORAL DAILY
Qty: 90 TABLET | Refills: 1 | Status: SHIPPED | OUTPATIENT
Start: 2021-03-08 | End: 2021-11-29

## 2021-03-12 ENCOUNTER — OFFICE VISIT (OUTPATIENT)
Dept: ORTHOPEDIC SURGERY | Age: 71
End: 2021-03-12
Payer: COMMERCIAL

## 2021-03-12 VITALS — RESPIRATION RATE: 16 BRPM | BODY MASS INDEX: 33.29 KG/M2 | HEIGHT: 64 IN | WEIGHT: 195 LBS

## 2021-03-12 DIAGNOSIS — M17.11 PRIMARY OSTEOARTHRITIS OF RIGHT KNEE: Primary | ICD-10-CM

## 2021-03-12 DIAGNOSIS — M17.11 ARTHRITIS OF RIGHT KNEE: ICD-10-CM

## 2021-03-12 PROCEDURE — 20610 DRAIN/INJ JOINT/BURSA W/O US: CPT | Performed by: PHYSICIAN ASSISTANT

## 2021-03-12 PROCEDURE — 99212 OFFICE O/P EST SF 10 MIN: CPT | Performed by: PHYSICIAN ASSISTANT

## 2021-03-12 ASSESSMENT — ENCOUNTER SYMPTOMS
GASTROINTESTINAL NEGATIVE: 1
EYES NEGATIVE: 1
RESPIRATORY NEGATIVE: 1

## 2021-03-12 NOTE — PROGRESS NOTES
I reviewed and agree with the portions of the HPI, review of systems, vital documentation and plan performed by my staff and have added/addended where appropriate. Review of Systems   Constitutional: Negative. HENT: Negative. Eyes: Negative. Respiratory: Negative. Cardiovascular: Negative. Gastrointestinal: Negative. Genitourinary: Negative. Musculoskeletal: Positive for arthralgias and myalgias. Skin: Negative. Neurological: Negative. Psychiatric/Behavioral: Negative. HPI:  Gladys Sheldon is a 69 y.o. female returns for follow up on her right knee pain. She reports she is getting pain upon standing at 3/10, but at rest its 0/10. Her last injection was 4 months ago and she would like a repeat injection today. Past Medical History:   Diagnosis Date    Arthritis     generalized OA    DVT of lower extremity (deep venous thrombosis) ([address]     Left 2012    Fracture of left ankle 02/01/2004    GERD (gastroesophageal reflux disease)     H/O Doppler ultrasound 11/2/14    Venous Doppler. Left lower extremity probably old DVT in the distal SFA and popliteal areas otherwise right lower extremity reveals normal findings.  H/O Doppler ultrasound 09/19/2017    LLE - chronic DVT, severe veous reflux    H/O echocardiogram 09/26/2016    EF60% mild MR, TR, mild pulm htn    H/O echocardiogram 07/18/2018    EF55-60% mild TR    Homocystinemia (HCC)     Hx of blood clots     \"hx of PE- 2013 and had DVT left leg that went to my lung\"    Hx of Doppler echocardiogram 12/22/2017    EF 50-60% mild biatrial enlargement, mild to mod TR, mildly elevated pulmonary artery pressure.     Hyperlipidemia     Hypertension, essential     Left leg swelling     wears compression stocking    Non-alcoholic fatty liver disease     Obesity     Osteoarthritis     Other chest pain 12/17/2018    Pulmonary embolism ([address] 2012    Bilateral    Spinal stenosis     lumbar spine--scheduled for epidural injection 3/19/2018    Varicose veins of lower extremity     Wears glasses        Past Surgical History:   Procedure Laterality Date    ANKLE SURGERY Left 02/2004    \"have plate and pins still\"    COLONOSCOPY  2010?     DILATION AND CURETTAGE OF UTERUS  May 2012    TONSILLECTOMY      age 10   [facility] TOTAL KNEE ARTHROPLASTY Left 4/23/2019    KNEE TOTAL ARTHROPLASTY LEFT performed by Steve Razo DO at [address] EXTRACTION         Family History   Problem Relation Age of Onset    COPD Mother     Cancer Father         kidney    Heart Attack Father     Heart Disease Father     No Known Problems Sister     No Known Problems Sister        Social History     Socioeconomic History    Marital status:      Spouse name: Not on file    Number of children: 2    Years of education: Not on file    Highest education level: Not on file   Occupational History    Not on file   Social Needs    Financial resource strain: Not hard at all   [employer] insecurity     Worry: Never true     Inability: Never true   [employer] needs     Medical: No     Non-medical: No   Tobacco Use    Smoking status: Never Smoker    Smokeless tobacco: Never Used   Substance and Sexual Activity    Alcohol use: Yes     Comment: average \"one time per [employer] Drug use: No    Sexual activity: Never   Lifestyle    Physical activity     Days per week: Not on file     Minutes per session: Not on file    Stress: Not on file   Relationships    Social connections     Talks on phone: Not on file     Gets together: Not on file     Attends Confucianism service: Not on file     Active member of club or organization: Not on file     Attends meetings of clubs or organizations: Not on file     Relationship status: Not on file    Intimate partner violence     Fear of current or ex partner: Not on file     Emotionally abused: Not on file     Physically abused: Not on file     Forced sexual activity: Not on file   Other Topics Concern    Not on file   Social History Narrative    Not on file       Current Outpatient Medications   Medication Sig Dispense Refill    meloxicam (MOBIC) 7.5 MG tablet Take 1 tablet by mouth daily 90 tablet 1    losartan-hydroCHLOROthiazide (HYZAAR) 100-25 MG per tablet Take 1 tablet by mouth daily 90 tablet 1    folic acid (FOLVITE) 1 MG tablet Take 1 tablet by mouth daily 90 tablet 1    pravastatin (PRAVACHOL) 10 MG tablet Take 1 tablet by mouth daily 90 tablet 1    rivaroxaban (XARELTO) 20 MG TABS tablet TAKE 1 TABLET BY MOUTH ONE TIME A DAY 90 tablet 1    aspirin 81 MG EC tablet Take 1 tablet by mouth daily 384 tablet 0    diclofenac sodium 1 % GEL Apply 4 g topically Apply 4 gms to lower back and knees 2-3 times a day as needed      magnesium gluconate (MAGONATE) 500 MG tablet Take 500 mg by mouth Daily       No current facility-administered medications for this visit. Allergies   Allergen Reactions    Ace Inhibitors        Review of Systems:  See above      Physical Exam:   Resp 16   Ht 5' 4\" (1.626 m)   Wt 195 lb (88.5 kg)   LMP 01/16/2010   BMI 33.47 kg/m²        Gait is Antalgic. The patient can bear weight on the injured extremity. Gen/Psych:Examination reveals a pleasant individual in no acute distress. The patient is oriented to time, place and person. The patient's mood and affect are appropriate. Patient appears well nourished. Body habitus is      Lymph:  No lymphedema in bilateral lower extremities     Skin intact in bilateral lower extremities with no ulcerations, lesions, rash, erythema. Vascular: There are no varicosities in bilateral lower extremities, sensation intact to light touch over bilateral lower extremities.       right leg/knee exam:  Leg alignment:     neutral  Quadriceps/hamstring atrophy:   no  Knee effusion:    no   Knee erythema:   no  ROM:     Full, 0-120 degrees  Varus laxity at 0 and 30 deg's: no  Valguslaxity at 0 and 30 deg's: no Recurvatum:    no  Tenderness at:   Femoral compartment    Bilateral Knee strength is 5/5 flexion and extension  There is + L2-S1 motor and sensory function in bilateral lower extremities    Outside record review: office notes and x-rays reviewed    Moderate degenerative changes noted within the medial compartment and moderate to severe changes in the patellofemoral compartment noted. Impression:  right knee DJD      Plan:  Natural history and expected course discussed. Questions answered. Patient Instructions   Continue to weight bear as tolerated  Continue range of motion  Ice and elevate as needed  Tylenol or Motrin for pain  Injection given today in the office   Rest for 24-48 hours  Follow up as needed      Right knee injection procedure note    Pre-procedure diagnosis:  Right knee DJD    Post-procedure diagnosis:  same    Procedure: The planned procedure/risks/benefits/alternatives were discussed with the patient. Risks include, but are not limited to, increased pain, drug reaction, infection, bleeding, lack of improvement, neurovascular injury, and increased blood sugar levels. The patient understood and all of their questions were answered. The right knee was prepped with alcohol, then a 22 gauge needle was advanced into the inferior-lateral joint without difficulty. The joint was then injected with 1 ml 1% lidocaine, 1ml of Kenalog (40 mg/ml), 1ml of 0.5% bupivacaine. The injection site was cleansed with isopropyl alcohol and a band-aid was placed. Complications:  None, the patient tolerated the procedure well. Instructions: The patient was advised to rest the knee and decrease activity for the next 24 to 48 hours. May use prescription or OTC pain relievers as needed for any post-injection pain as well as ice.

## 2021-06-23 ENCOUNTER — TELEPHONE (OUTPATIENT)
Dept: FAMILY MEDICINE CLINIC | Age: 71
End: 2021-06-23

## 2021-06-23 DIAGNOSIS — E78.5 HYPERLIPIDEMIA, UNSPECIFIED HYPERLIPIDEMIA TYPE: ICD-10-CM

## 2021-06-23 DIAGNOSIS — I10 HYPERTENSION, ESSENTIAL: Primary | ICD-10-CM

## 2021-06-23 DIAGNOSIS — M17.11 PRIMARY OSTEOARTHRITIS OF RIGHT KNEE: ICD-10-CM

## 2021-06-23 NOTE — TELEPHONE ENCOUNTER
Please place orders for CBC, CMP, and lipid panel. Diagnosis hyperlipidemia, osteoarthritis, and hypertension, may also need this for be well within physical -as she works from [employer]. May need to check with her about that specifically.

## 2021-06-25 DIAGNOSIS — E78.5 HYPERLIPIDEMIA, UNSPECIFIED HYPERLIPIDEMIA TYPE: ICD-10-CM

## 2021-06-25 DIAGNOSIS — I10 HYPERTENSION, ESSENTIAL: ICD-10-CM

## 2021-06-25 DIAGNOSIS — M17.11 PRIMARY OSTEOARTHRITIS OF RIGHT KNEE: ICD-10-CM

## 2021-06-25 LAB
A/G RATIO: 1.8 (ref 1.1–2.2)
ALBUMIN SERPL-MCNC: 4.4 G/DL (ref 3.4–5)
ALP BLD-CCNC: 90 U/L (ref 40–129)
ALT SERPL-CCNC: 24 U/L (ref 10–40)
ANION GAP SERPL CALCULATED.3IONS-SCNC: 13 MMOL/L (ref 3–16)
AST SERPL-CCNC: 26 U/L (ref 15–37)
BILIRUB SERPL-MCNC: 0.6 MG/DL (ref 0–1)
BUN BLDV-MCNC: 26 MG/DL (ref 7–20)
CALCIUM SERPL-MCNC: 10.1 MG/DL (ref 8.3–10.6)
CHLORIDE BLD-SCNC: 103 MMOL/L (ref 99–110)
CHOLESTEROL, TOTAL: 245 MG/DL (ref 0–199)
CO2: 27 MMOL/L (ref 21–32)
CREAT SERPL-MCNC: 0.9 MG/DL (ref 0.6–1.2)
GFR AFRICAN AMERICAN: >60
GFR NON-AFRICAN AMERICAN: >60
GLOBULIN: 2.5 G/DL
GLUCOSE BLD-MCNC: 89 MG/DL (ref 70–99)
HCT VFR BLD CALC: 39.8 % (ref 36–48)
HDLC SERPL-MCNC: 75 MG/DL (ref 40–60)
HEMOGLOBIN: 13.6 G/DL (ref 12–16)
LDL CHOLESTEROL CALCULATED: 146 MG/DL
MCH RBC QN AUTO: 29.1 PG (ref 26–34)
MCHC RBC AUTO-ENTMCNC: 34.1 G/DL (ref 31–36)
MCV RBC AUTO: 85.4 FL (ref 80–100)
PDW BLD-RTO: 15.1 % (ref 12.4–15.4)
PLATELET # BLD: 302 K/UL (ref 135–450)
PMV BLD AUTO: 8.7 FL (ref 5–10.5)
POTASSIUM SERPL-SCNC: 4.7 MMOL/L (ref 3.5–5.1)
RBC # BLD: 4.66 M/UL (ref 4–5.2)
SODIUM BLD-SCNC: 143 MMOL/L (ref 136–145)
TOTAL PROTEIN: 6.9 G/DL (ref 6.4–8.2)
TRIGL SERPL-MCNC: 122 MG/DL (ref 0–150)
VLDLC SERPL CALC-MCNC: 24 MG/DL
WBC # BLD: 5.4 K/UL (ref 4–11)

## 2021-07-02 ENCOUNTER — HOSPITAL ENCOUNTER (OUTPATIENT)
Dept: WOMENS IMAGING | Age: 71
Discharge: HOME | End: 2021-07-02
Payer: COMMERCIAL

## 2021-07-02 DIAGNOSIS — Z12.31 ENCOUNTER FOR SCREENING MAMMOGRAM FOR MALIGNANT NEOPLASM OF BREAST: ICD-10-CM

## 2021-07-02 PROCEDURE — 77067 SCR MAMMO BI INCL CAD: CPT

## 2021-07-23 LAB
CHOLESTEROL: 226 MG/DL
GLUCOSE BLD-MCNC: 90 MG/DL (ref 70–99)
HDLC SERPL-MCNC: 71 MG/DL
LDL CHOLESTEROL CALCULATED: 136 MG/DL
PATIENT FASTING?: YES
TRIGL SERPL-MCNC: 96 MG/DL

## 2021-09-01 RX ORDER — PRAVASTATIN SODIUM 10 MG
10 TABLET ORAL DAILY
Qty: 90 TABLET | Refills: 1 | Status: SHIPPED | OUTPATIENT
Start: 2021-09-01 | End: 2022-02-22 | Stop reason: SDUPTHER

## 2021-09-01 RX ORDER — LOSARTAN POTASSIUM AND HYDROCHLOROTHIAZIDE 25; 100 MG/1; MG/1
1 TABLET ORAL DAILY
Qty: 90 TABLET | Refills: 1 | Status: SHIPPED | OUTPATIENT
Start: 2021-09-01 | End: 2022-02-22 | Stop reason: SDUPTHER

## 2021-09-01 RX ORDER — FOLIC ACID 1 MG/1
1 TABLET ORAL DAILY
Qty: 90 TABLET | Refills: 1 | Status: SHIPPED | OUTPATIENT
Start: 2021-09-01 | End: 2022-02-22 | Stop reason: SDUPTHER

## 2021-09-13 DIAGNOSIS — M79.89 SWELLING OF EXTREMITY: Primary | ICD-10-CM

## 2021-09-16 ENCOUNTER — PROCEDURE VISIT (OUTPATIENT)
Dept: CARDIOLOGY CLINIC | Age: 71
End: 2021-09-16
Payer: COMMERCIAL

## 2021-09-16 DIAGNOSIS — M79.89 SWELLING OF EXTREMITY: Primary | ICD-10-CM

## 2021-09-16 PROCEDURE — 93971 EXTREMITY STUDY: CPT | Performed by: INTERNAL MEDICINE

## 2021-09-17 ENCOUNTER — TELEPHONE (OUTPATIENT)
Dept: CARDIOLOGY CLINIC | Age: 71
End: 2021-09-17

## 2021-09-17 NOTE — TELEPHONE ENCOUNTER
Summary        Limited study due to edema especially below the knee.    Evidence of partially occlusive chronic DVT in the Left proximal and mid    SFV.    Respirophasic venous flow with good augmentation in the left leg.    There is significant reflux noted in the left CFV .      Recommendations        CONPRESSION SOCKS        Signature        ------------------------------------------------------------------    Electronically signed by Yazan Velasco MD    (Interpreting physician) on 09/16/2021 at 01:36 PM       Advised pt of results and recommendations. Pt verbalized understanding. Pt opted to not have order put in for compression stockings at this time, but information regarding strength and purchase options were given to pt.

## 2021-09-29 ENCOUNTER — OFFICE VISIT (OUTPATIENT)
Dept: FAMILY MEDICINE CLINIC | Age: 71
End: 2021-09-29
Payer: COMMERCIAL

## 2021-09-29 VITALS
SYSTOLIC BLOOD PRESSURE: 132 MMHG | WEIGHT: 246 LBS | OXYGEN SATURATION: 98 % | DIASTOLIC BLOOD PRESSURE: 68 MMHG | HEIGHT: 64 IN | BODY MASS INDEX: 42 KG/M2 | HEART RATE: 84 BPM

## 2021-09-29 DIAGNOSIS — M15.9 GENERALIZED OA: Chronic | ICD-10-CM

## 2021-09-29 DIAGNOSIS — E66.01 CLASS 3 SEVERE OBESITY DUE TO EXCESS CALORIES WITHOUT SERIOUS COMORBIDITY WITH BODY MASS INDEX (BMI) OF 40.0 TO 44.9 IN ADULT (HCC): Chronic | ICD-10-CM

## 2021-09-29 DIAGNOSIS — M17.11 PRIMARY OSTEOARTHRITIS OF RIGHT KNEE: ICD-10-CM

## 2021-09-29 DIAGNOSIS — I10 HYPERTENSION, ESSENTIAL: ICD-10-CM

## 2021-09-29 DIAGNOSIS — I27.82 CHRONIC PULMONARY EMBOLISM, UNSPECIFIED PULMONARY EMBOLISM TYPE, UNSPECIFIED WHETHER ACUTE COR PULMONALE PRESENT (HCC): ICD-10-CM

## 2021-09-29 DIAGNOSIS — M79.662 PAIN OF LEFT LOWER LEG: Primary | ICD-10-CM

## 2021-09-29 DIAGNOSIS — E78.5 HYPERLIPIDEMIA, UNSPECIFIED HYPERLIPIDEMIA TYPE: ICD-10-CM

## 2021-09-29 PROCEDURE — 99213 OFFICE O/P EST LOW 20 MIN: CPT | Performed by: FAMILY MEDICINE

## 2021-09-29 ASSESSMENT — ENCOUNTER SYMPTOMS
TROUBLE SWALLOWING: 0
WHEEZING: 0
DIARRHEA: 0
VOMITING: 0
CHEST TIGHTNESS: 0
ABDOMINAL PAIN: 0
BLOOD IN STOOL: 0
NAUSEA: 0
EYE PAIN: 0
SHORTNESS OF BREATH: 0
BACK PAIN: 1

## 2021-09-29 NOTE — PROGRESS NOTES
9/29/2021    Rin Foley    Chief Complaint   Patient presents with    Follow-up    Leg Swelling     left. x4 months. all the time.  Back Problem    Shortness of Breath     on exertion. HPI  History was obtained from the patient. Carina Huffman is a 66 y.o. female who presents today with routine follow-up on osteoarthritis, hypertension, hyperlipidemia, GERD, history of recurrent pulmonary emboli and increased weight. She does continue to remain active and work full-time. Last labs are satisfactory in June of this year. She has a little bit more shortness of breath in the last few weeks but she has gained a fair amount of weight in the last few months. Continues to have left leg pain and swelling she has had vascular studies that showed no evidence of blood clot advised by cardiology to wear some compression hose. Her colon CA screen is up-to-date with [facility] 2 years ago. Denies current chest pain or change in GERD symptoms. Blood pressure is reasonable. She remains on Xarelto and anti-inflammatory without evidence of GI blood loss. REVIEW OF SYMPTOMS    Review of Systems   Constitutional: Negative for activity change and fatigue. HENT: Negative for congestion, hearing loss, mouth sores and trouble swallowing. Eyes: Negative for pain and visual disturbance. Respiratory: Negative for chest tightness, shortness of breath and wheezing. Patient with past history of PE remains on chronic Xarelto therapy without recurrence. Some increased shortness of breath but may be due to increasing weight. Cardiovascular: Negative for chest pain and palpitations. Gastrointestinal: Negative for abdominal pain, blood in stool, diarrhea, nausea and vomiting. Endocrine: Negative for polydipsia and polyuria. Genitourinary: Negative for dysuria, frequency and urgency. Musculoskeletal: Positive for arthralgias, back pain and gait problem. Negative for neck stiffness. Skin: Negative for rash. Allergic/Immunologic: Negative for environmental allergies. Neurological: Negative for dizziness, seizures, speech difficulty and weakness. Hematological: Does not bruise/bleed easily. Psychiatric/Behavioral: Negative for agitation, confusion, dysphoric mood, hallucinations and suicidal ideas. The patient is nervous/anxious. PAST MEDICAL HISTORY  Past Medical History:   Diagnosis Date    Arthritis     generalized OA    DVT of lower extremity (deep venous thrombosis) ([address]     Left 2012    Fracture of left ankle 02/01/2004    GERD (gastroesophageal reflux disease)     H/O Doppler ultrasound 11/02/2014    Venous Doppler. Left lower extremity probably old DVT in the distal SFA and popliteal areas otherwise right lower extremity reveals normal findings.  H/O Doppler ultrasound 09/19/2017    LLE - chronic DVT, severe veous reflux    H/O echocardiogram 09/26/2016    EF60% mild MR, TR, mild pulm htn    H/O echocardiogram 07/18/2018    EF55-60% mild TR    Homocystinemia (HCC)     Hx of blood clots     \"hx of PE- 2013 and had DVT left leg that went to my lung\"    Hx of Doppler echocardiogram 12/22/2017    EF 50-60% mild biatrial enlargement, mild to mod TR, mildly elevated pulmonary artery pressure.  Hyperlipidemia     Hypertension, essential     Left leg swelling     wears compression stocking    Left Lower Extremity Venous Doppler ultrasound 09/16/2021    Evidence of partially occlusive chronic DVT in the left proximal and mid SFV.     Non-alcoholic fatty liver disease     Obesity     Osteoarthritis     Other chest pain 12/17/2018    Pulmonary embolism ([address] 2012    Bilateral    Spinal stenosis     lumbar spine--scheduled for epidural injection 3/19/2018    Varicose veins of lower extremity     Wears glasses        FAMILY HISTORY  Family History   Problem Relation Age of Onset    COPD Mother     Cancer Father         kidney    Heart Attack Father     Heart Disease Father     No Known Problems Sister     No Known Problems Sister        SOCIAL HISTORY  Social History     Socioeconomic History    Marital status:      Spouse name: None    Number of children: 2    Years of education: None    Highest education level: None   Occupational History    None   Tobacco Use    Smoking status: Never Smoker    Smokeless tobacco: Never Used   Vaping Use    Vaping Use: Never used   Substance and Sexual Activity    Alcohol use: Yes     Comment: average \"one time per month\"    Drug use: No    Sexual activity: Never   Other Topics Concern    None   Social History Narrative    None     Social Determinants of Health     Financial Resource Strain: Low Risk     Difficulty of Paying Living Expenses: Not hard at all   Food Insecurity: No Food Insecurity    Worried About Running Out of Food in the Last Year: Never true    Elle of Food in the Last Year: Never true   Transportation Needs: No Transportation Needs    Lack of Transportation (Medical): No    Lack of Transportation (Non-Medical): No   Physical Activity:     Days of Exercise per Week:     Minutes of Exercise per Session:    Stress:     Feeling of Stress :    Social Connections:     Frequency of Communication with Friends and Family:     Frequency of Social Gatherings with Friends and Family:     Attends Restorationist Services:     Active Member of Clubs or Organizations:     Attends Club or Organization Meetings:     Marital Status:    Intimate Partner Violence:     Fear of Current or Ex-Partner:     Emotionally Abused:     Physically Abused:     Sexually Abused:         SURGICAL HISTORY  Past Surgical History:   Procedure Laterality Date    ANKLE SURGERY Left 02/2004    \"have plate and pins still\"    COLONOSCOPY  2010?     DILATION AND CURETTAGE OF UTERUS  May 2012    TONSILLECTOMY      age 10    TOTAL KNEE ARTHROPLASTY Left 4/23/2019    KNEE TOTAL ARTHROPLASTY LEFT performed by Som Montaño DO at [place] [address] WISDOM TOOTH EXTRACTION                   CURRENT MEDICATIONS  Current Outpatient Medications   Medication Sig Dispense Refill    losartan-hydroCHLOROthiazide (HYZAAR) 100-25 MG per tablet Take 1 tablet by mouth daily 90 tablet 1    folic acid (FOLVITE) 1 MG tablet Take 1 tablet by mouth daily 90 tablet 1    pravastatin (PRAVACHOL) 10 MG tablet Take 1 tablet by mouth daily 90 tablet 1    rivaroxaban (XARELTO) 20 MG TABS tablet TAKE 1 TABLET BY MOUTH ONE TIME A DAY 90 tablet 1    meloxicam (MOBIC) 7.5 MG tablet Take 1 tablet by mouth daily (Patient taking differently: Take 3.75 mg by mouth daily ) 90 tablet 1    aspirin 81 MG EC tablet Take 1 tablet by mouth daily 384 tablet 0    magnesium gluconate (MAGONATE) 500 MG tablet Take 500 mg by mouth Daily      Elastic Bandages & Supports (MEDICAL COMPRESSION THIGH HIGH) MISC 1 Package by Does not apply route daily Wear daily and remove nightly   20 - 30 mmgh 1 each 0    diclofenac sodium 1 % GEL Apply 4 g topically Apply 4 gms to lower back and knees 2-3 times a day as needed       No current facility-administered medications for this visit. ALLERGIES  Allergies   Allergen Reactions    Ace Inhibitors        PHYSICAL EXAM    /68 (Site: Right Upper Arm, Position: Sitting, Cuff Size: Medium Adult)   Pulse 84   Ht 5' 4\" (1.626 m)   Wt 246 lb (111.6 kg)   LMP 01/16/2010   SpO2 98%   BMI 42.23 kg/m²     Physical Exam  Constitutional:       General: She is not in acute distress. Appearance: She is obese. She is not ill-appearing, toxic-appearing or diaphoretic. HENT:      Nose: Nose normal.      Mouth/Throat:      Mouth: Mucous membranes are moist.      Pharynx: Oropharynx is clear. Cardiovascular:      Heart sounds: No murmur heard. Pulmonary:      Effort: No respiratory distress. Breath sounds: No wheezing, rhonchi or rales. Musculoskeletal:         General: Tenderness present. Cervical back: No rigidity.       Left lower leg: Edema present. Lymphadenopathy:      Cervical: No cervical adenopathy. Skin:     General: Skin is warm. Coloration: Skin is not jaundiced. Findings: No bruising. Neurological:      General: No focal deficit present. Mental Status: Mental status is at baseline. Cranial Nerves: No cranial nerve deficit. Motor: No weakness. Gait: Gait abnormal.   Psychiatric:         Mood and Affect: Mood normal.         Behavior: Behavior normal.         Thought Content: Thought content normal.         ASSESSMENT & PLAN     Diagnosis Orders   1. Pain of left lower leg  MRI LOWER EXTREMITY LEFT WO JT WO CONTRAST   2. Generalized OA     3. Hyperlipidemia, unspecified hyperlipidemia type     4. Class 3 severe obesity due to excess calories without serious comorbidity with body mass index (BMI) of 40.0 to 44.9 in adult ([facility]     5. Chronic pulmonary embolism, unspecified pulmonary embolism type, unspecified whether acute cor pulmonale present ([facility]     6. Hypertension, essential     7. Primary osteoarthritis of right knee     At this point she is to consider getting a high-dose flu shot in near future. She just had her Covid booster. We will get MRI of left lower leg as she is in extreme pain -is affecting her ADLs and ability to ambulate. Seems to be getting worse over the last few months. She is to continue on usual regimen call with questions or problems. Encouraged to work on weight loss and keep us posted with how her breathing is doing. Return in about 4 months (around 1/29/2022), or if symptoms worsen or fail to improve.          Electronically signed by Yaya Montejo MD on 9/29/2021

## 2021-10-14 ENCOUNTER — HOSPITAL ENCOUNTER (OUTPATIENT)
Dept: MRI IMAGING | Age: 71
Discharge: HOME | End: 2021-10-14
Payer: COMMERCIAL

## 2021-10-14 ENCOUNTER — TELEPHONE (OUTPATIENT)
Dept: FAMILY MEDICINE CLINIC | Age: 71
End: 2021-10-14

## 2021-10-14 DIAGNOSIS — M79.662 PAIN OF LEFT LOWER LEG: ICD-10-CM

## 2021-10-14 DIAGNOSIS — M79.662 PAIN OF LEFT LOWER LEG: Primary | ICD-10-CM

## 2021-10-14 PROCEDURE — 73718 MRI LOWER EXTREMITY W/O DYE: CPT

## 2021-11-29 RX ORDER — MELOXICAM 7.5 MG/1
3.75 TABLET ORAL DAILY
Qty: 45 TABLET | Refills: 0 | Status: SHIPPED | OUTPATIENT
Start: 2021-11-29 | End: 2022-05-22 | Stop reason: SDUPTHER

## 2022-01-31 ENCOUNTER — VIRTUAL VISIT (OUTPATIENT)
Dept: FAMILY MEDICINE CLINIC | Age: 72
End: 2022-01-31
Payer: COMMERCIAL

## 2022-01-31 DIAGNOSIS — I27.82 CHRONIC PULMONARY EMBOLISM, UNSPECIFIED PULMONARY EMBOLISM TYPE, UNSPECIFIED WHETHER ACUTE COR PULMONALE PRESENT (HCC): ICD-10-CM

## 2022-01-31 DIAGNOSIS — M17.11 PRIMARY OSTEOARTHRITIS OF RIGHT KNEE: ICD-10-CM

## 2022-01-31 DIAGNOSIS — I10 HYPERTENSION, ESSENTIAL: ICD-10-CM

## 2022-01-31 DIAGNOSIS — M15.9 GENERALIZED OA: Chronic | ICD-10-CM

## 2022-01-31 DIAGNOSIS — E78.5 HYPERLIPIDEMIA, UNSPECIFIED HYPERLIPIDEMIA TYPE: ICD-10-CM

## 2022-01-31 DIAGNOSIS — R06.09 DYSPNEA ON EXERTION: Primary | ICD-10-CM

## 2022-01-31 PROCEDURE — 99213 OFFICE O/P EST LOW 20 MIN: CPT | Performed by: FAMILY MEDICINE

## 2022-01-31 ASSESSMENT — ENCOUNTER SYMPTOMS
SHORTNESS OF BREATH: 1
TROUBLE SWALLOWING: 0
BACK PAIN: 1
DIARRHEA: 0
ABDOMINAL PAIN: 0
WHEEZING: 0
NAUSEA: 0
CHEST TIGHTNESS: 0
VOMITING: 0
EYE PAIN: 0
BLOOD IN STOOL: 0

## 2022-01-31 NOTE — PROGRESS NOTES
2022    TELEHEALTH EVALUATION -- Audio/Visual (During SXAIN-62 public health emergency)    HPI:    Camilla Kerns (:  1950) has requested an audio/video evaluation for the following concern(s):    OA, history of PE and DVT recurrent, hypertension, and GERD. She is having some increased shortness of breath with exertion no real chest pain no increased edema. Had similar issue about 3 years ago cardiac echo and CTA chest was unremarkable at that point. She has been on Xarelto quite chronically for several years without issue no unusual bleeding or bruising at this point. She is under increased stress between working and taking care of her  who has metastatic prostate cancer. She has had her Covid shot x2 and Covid booster as well as her flu shot. Previous labs in 2021 look normal.  These include a CBC lipid panel and a CMP. Review of Systems   Constitutional: Negative for activity change and fatigue. HENT: Negative for congestion, hearing loss, mouth sores and trouble swallowing. Eyes: Negative for pain and visual disturbance. Respiratory: Positive for shortness of breath. Negative for chest tightness and wheezing. Cardiovascular: Negative for chest pain and palpitations. Gastrointestinal: Negative for abdominal pain, blood in stool, diarrhea, nausea and vomiting. Endocrine: Negative for polydipsia and polyuria. Genitourinary: Negative for dysuria, frequency and urgency. Musculoskeletal: Positive for arthralgias and back pain. Negative for gait problem and neck stiffness. Skin: Negative for rash. Allergic/Immunologic: Negative for environmental allergies. Neurological: Negative for dizziness, seizures, speech difficulty and weakness. Hematological: Does not bruise/bleed easily. Psychiatric/Behavioral: Negative for agitation, confusion, dysphoric mood, hallucinations and suicidal ideas. The patient is not nervous/anxious.         Prior to Visit Medications Medication Sig Taking? Authorizing Provider   meloxicam (MOBIC) 7.5 MG tablet Take 0.5 tablets by mouth daily Yes Samira Chavarria MD   Elastic Bandages & Supports (MEDICAL COMPRESSION THIGH HIGH) [address] [facility] 1 Package by Does not apply route daily Wear daily and remove nightly   20 - 30 mmgh Yes Chris Mathews MD   losartan-hydroCHLOROthiazide (HYZAAR) 100-25 MG per tablet Take 1 tablet by mouth daily Yes Samira Chavarria MD   folic acid (FOLVITE) 1 MG tablet Take 1 tablet by mouth daily Yes Samira Chavarria MD   pravastatin (PRAVACHOL) 10 MG tablet Take 1 tablet by mouth daily Yes Samira Chavarria MD   rivaroxaban (XARELTO) 20 MG TABS tablet TAKE 1 TABLET BY MOUTH ONE TIME A DAY Yes Samira Chavarria MD   aspirin 81 MG EC tablet Take 1 tablet by mouth daily Yes Chris Mathews MD   magnesium gluconate (MAGONATE) 500 MG tablet Take 500 mg by mouth Daily Yes Historical Provider, MD   diclofenac sodium 1 % GEL Apply 4 g topically Apply 4 gms to lower back and knees 2-3 times a day as needed  Patient not taking: Reported on 1/31/2022  Historical Provider, MD       Social History     Tobacco Use    Smoking status: Never Smoker    Smokeless tobacco: Never Used   Vaping Use    Vaping Use: Never used   Substance Use Topics    Alcohol use: Yes     Comment: average \"one time per [employer] Drug use:  No            PHYSICAL EXAMINATION:  [ INSTRUCTIONS:  \"[x]\" Indicates a positive item  \"[]\" Indicates a negative item  -- DELETE ALL ITEMS NOT EXAMINED]  Vital Signs: (As obtained by patient/caregiver or practitioner observation)    Blood pressure-  Heart rate-    Respiratory rate-    Temperature-  Pulse oximetry-     Constitutional: [x] Appears well-developed and well-nourished [x] No apparent distress      [] Abnormal-   Mental status  [x] Alert and awake  [x] Oriented to person/place/time [x]Able to follow commands      Eyes:  EOM    [x]  Normal  [] Abnormal-  Sclera  []  Normal  [] Abnormal - Discharge []  None visible  [] Abnormal -    HENT:   [x] Normocephalic, atraumatic. [] Abnormal   [x] Mouth/Throat: Mucous membranes are moist.     External Ears [] Normal  [] Abnormal-     Neck: [x] No visualized mass     Pulmonary/Chest: [x] Respiratory effort normal.  [x] No visualized signs of difficulty breathing or respiratory distress        [] Abnormal-      Musculoskeletal:   [] Normal gait with no signs of ataxia         [x] Normal range of motion of neck        [] Abnormal-       Neurological:        [x] No Facial Asymmetry (Cranial nerve 7 motor function) (limited exam to video visit)          [x] No gaze palsy        [] Abnormal-         Skin:        [x] No significant exanthematous lesions or discoloration noted on facial skin         [] Abnormal-            Psychiatric:       [x] Normal Affect [] No Hallucinations        [] Abnormal-     Other pertinent observable physical exam findings-     ASSESSMENT/PLAN:  1. Hypertension, essential      2. Primary osteoarthritis of right knee      3. Chronic pulmonary embolism, unspecified pulmonary embolism type, unspecified whether acute cor pulmonale present ([address]      4. Generalized OA      5. Hyperlipidemia, unspecified hyperlipidemia type      6. Dyspnea on exertion    - ECHO Complete 2D W Doppler W Color; Future  He to continue on usual regimen. She works for [facility] [employer] cardiology on the note place order for cardiac echo and they can run that at their office and her convenience. And follow-up for results. If shortness of breath persists would consider repeating CT of chest and/or getting PFTs. She is to continue to work on exercise as possible once we get the test results back to help with possible cardiopulmonary deconditioning. Work on healthy lifestyle and stay on same regimen at this point. Would provide refills as needed. Return in about 4 months (around 5/31/2022), or if symptoms worsen or fail to improve.     Salma Levy, was evaluated through a synchronous (real-time) audio-video encounter. The patient (or guardian if applicable) is aware that this is a billable service, which includes applicable co-pays. This Virtual Visit was conducted with patient's (and/or legal guardian's) consent. The visit was conducted pursuant to the emergency declaration under the [address] [place] [address], [address] [place] [address] authority and the [email or web address] General Act. Patient identification was verified, and a caregiver was present when appropriate. The patient was located at home in a state where the provider was licensed to provide care. Total time spent on this encounter: Not billed by time    --Yohan Godinze MD on 1/31/2022 at 5:45 PM    An electronic signature was used to authenticate this note.

## 2022-02-02 ENCOUNTER — TELEPHONE (OUTPATIENT)
Dept: FAMILY MEDICINE CLINIC | Age: 72
End: 2022-02-02

## 2022-02-02 DIAGNOSIS — I10 HYPERTENSION, ESSENTIAL: Primary | ICD-10-CM

## 2022-02-02 NOTE — TELEPHONE ENCOUNTER
Patient noting blood pressure elevation up to 180s over 80s to 90s. No change in diet activity or weight. Has tried taking extra half dose of her losartan HCTZ with limited success. No associated neurologic symptoms or shortness of breath. Plan: Continue on low-salt diet and current dose of losartan HCTZ- we will add metoprolol 25 mg p.o. twice daily to her regimen. This was sent to [address] [place] [address] in [place]. She is to monitor BP's and HR with this combo & and let us know how they are running in the next 3 to 4 weeks.

## 2022-02-11 ENCOUNTER — TELEPHONE (OUTPATIENT)
Dept: FAMILY MEDICINE CLINIC | Age: 72
End: 2022-02-11

## 2022-02-18 ENCOUNTER — PROCEDURE VISIT (OUTPATIENT)
Dept: CARDIOLOGY CLINIC | Age: 72
End: 2022-02-18

## 2022-02-18 DIAGNOSIS — R06.02 SOB (SHORTNESS OF BREATH): ICD-10-CM

## 2022-02-18 DIAGNOSIS — R06.09 DYSPNEA ON EXERTION: Primary | ICD-10-CM

## 2022-02-18 DIAGNOSIS — R06.09 DYSPNEA ON EXERTION: ICD-10-CM

## 2022-02-18 LAB
LV EF: 58 %
LVEF MODALITY: NORMAL

## 2022-02-18 NOTE — TELEPHONE ENCOUNTER
Spoke to [facility] for Dr Willy Chiu and he was not aware the [facility] would need a pre-cert,Clem will start the process.

## 2022-02-22 RX ORDER — LOSARTAN POTASSIUM AND HYDROCHLOROTHIAZIDE 25; 100 MG/1; MG/1
1 TABLET ORAL DAILY
Qty: 90 TABLET | Refills: 1 | Status: SHIPPED | OUTPATIENT
Start: 2022-02-22 | End: 2022-08-30 | Stop reason: SDUPTHER

## 2022-02-22 RX ORDER — FOLIC ACID 1 MG/1
1 TABLET ORAL DAILY
Qty: 90 TABLET | Refills: 1 | Status: SHIPPED | OUTPATIENT
Start: 2022-02-22 | End: 2022-08-30 | Stop reason: SDUPTHER

## 2022-02-22 RX ORDER — PRAVASTATIN SODIUM 10 MG
10 TABLET ORAL DAILY
Qty: 90 TABLET | Refills: 1 | Status: SHIPPED | OUTPATIENT
Start: 2022-02-22 | End: 2022-08-30 | Stop reason: SDUPTHER

## 2022-03-07 DIAGNOSIS — R06.02 SOB (SHORTNESS OF BREATH): Primary | ICD-10-CM

## 2022-03-22 ENCOUNTER — PROCEDURE VISIT (OUTPATIENT)
Dept: CARDIOLOGY CLINIC | Age: 72
End: 2022-03-22
Payer: COMMERCIAL

## 2022-03-22 DIAGNOSIS — R06.02 SOB (SHORTNESS OF BREATH): ICD-10-CM

## 2022-03-22 LAB
LV EF: 73 %
LVEF MODALITY: NORMAL

## 2022-03-22 PROCEDURE — 93015 CV STRESS TEST SUPVJ I&R: CPT | Performed by: INTERNAL MEDICINE

## 2022-03-22 PROCEDURE — 78452 HT MUSCLE IMAGE SPECT MULT: CPT | Performed by: INTERNAL MEDICINE

## 2022-03-22 PROCEDURE — A9500 TC99M SESTAMIBI: HCPCS | Performed by: INTERNAL MEDICINE

## 2022-03-23 ENCOUNTER — TELEPHONE (OUTPATIENT)
Dept: CARDIOLOGY CLINIC | Age: 72
End: 2022-03-23

## 2022-03-23 NOTE — TELEPHONE ENCOUNTER
Results given to the patient. ECG portion of stress test is negative for ischemia by diagnostic criteria.    Normal EF 73 % with normal ventricular contractility.    Small anterior fixed defect with normal wall motion suggestive of breast    artifact . No ischemia    Normal stress test

## 2022-05-23 RX ORDER — MELOXICAM 7.5 MG/1
3.75 TABLET ORAL DAILY
Qty: 45 TABLET | Refills: 0 | Status: SHIPPED | OUTPATIENT
Start: 2022-05-23 | End: 2022-07-31 | Stop reason: SDUPTHER

## 2022-05-23 RX ORDER — MELOXICAM 7.5 MG/1
TABLET ORAL
Qty: 45 TABLET | Refills: 0 | OUTPATIENT
Start: 2022-05-23

## 2022-05-25 ENCOUNTER — TELEPHONE (OUTPATIENT)
Dept: FAMILY MEDICINE CLINIC | Age: 72
End: 2022-05-25

## 2022-06-07 ENCOUNTER — OFFICE VISIT (OUTPATIENT)
Dept: FAMILY MEDICINE CLINIC | Age: 72
End: 2022-06-07
Payer: COMMERCIAL

## 2022-06-07 VITALS
DIASTOLIC BLOOD PRESSURE: 84 MMHG | HEART RATE: 62 BPM | HEIGHT: 64 IN | WEIGHT: 256.7 LBS | BODY MASS INDEX: 43.83 KG/M2 | OXYGEN SATURATION: 96 % | SYSTOLIC BLOOD PRESSURE: 130 MMHG

## 2022-06-07 DIAGNOSIS — M17.11 PRIMARY OSTEOARTHRITIS OF RIGHT KNEE: ICD-10-CM

## 2022-06-07 DIAGNOSIS — E66.01 CLASS 3 SEVERE OBESITY DUE TO EXCESS CALORIES WITHOUT SERIOUS COMORBIDITY WITH BODY MASS INDEX (BMI) OF 40.0 TO 44.9 IN ADULT (HCC): ICD-10-CM

## 2022-06-07 DIAGNOSIS — M13.0 POLYARTHRITIS: ICD-10-CM

## 2022-06-07 DIAGNOSIS — M54.50 CHRONIC BILATERAL LOW BACK PAIN, UNSPECIFIED WHETHER SCIATICA PRESENT: ICD-10-CM

## 2022-06-07 DIAGNOSIS — R06.09 DYSPNEA ON EXERTION: Primary | ICD-10-CM

## 2022-06-07 DIAGNOSIS — G89.29 CHRONIC BILATERAL LOW BACK PAIN, UNSPECIFIED WHETHER SCIATICA PRESENT: ICD-10-CM

## 2022-06-07 PROCEDURE — 1123F ACP DISCUSS/DSCN MKR DOCD: CPT | Performed by: PHYSICIAN ASSISTANT

## 2022-06-07 PROCEDURE — 99214 OFFICE O/P EST MOD 30 MIN: CPT | Performed by: PHYSICIAN ASSISTANT

## 2022-06-07 RX ORDER — TIZANIDINE 4 MG/1
4 TABLET ORAL 3 TIMES DAILY PRN
Qty: 30 TABLET | Refills: 0 | Status: SHIPPED | OUTPATIENT
Start: 2022-06-07

## 2022-06-07 RX ORDER — TIZANIDINE 4 MG/1
4 TABLET ORAL 3 TIMES DAILY PRN
Qty: 30 TABLET | Refills: 0 | Status: SHIPPED | OUTPATIENT
Start: 2022-06-07 | End: 2022-06-07

## 2022-06-07 ASSESSMENT — PATIENT HEALTH QUESTIONNAIRE - PHQ9
SUM OF ALL RESPONSES TO PHQ9 QUESTIONS 1 & 2: 0
SUM OF ALL RESPONSES TO PHQ QUESTIONS 1-9: 0
1. LITTLE INTEREST OR PLEASURE IN DOING THINGS: 0
SUM OF ALL RESPONSES TO PHQ QUESTIONS 1-9: 0
2. FEELING DOWN, DEPRESSED OR HOPELESS: 0

## 2022-06-07 NOTE — PROGRESS NOTES
6/7/2022    Yajaira Roberts    Chief Complaint   Patient presents with    Follow-up    Shortness of Breath     on going for 6 months has discussed with Dr. Cristin Pringle.  Discuss Labs     discuss echo and stress test results. HPI  History was obtained from pt. Alfredito Nolasco is a 66 y.o. female with a PMHx as listed below who presents today for ROV and to discuss imaging results. ECHO - 2/18/22    Dyspnea- pt has had echo and stress test that were normal except for pulmonary hypertension- pt says it seems to be getting worse, can walk up stairs with mild SOB, but if she carries laundry she will be very SOB. Pt has noticed that she wheezing as well when gasping for breath. Denies smoking history or exposure. No symptoms of bleeding. Arthritis in back seems to be getting worse. Sometimes when she standing it feels like she has a spasm in her back and it tightens up. Pt was on meloxicam which they decreased due to kidney function. Patient wonders if there is any else they can do? She also has a lot of pain in the joints of her fingers and thumbs as well as morning stiffness, patient says it takes 30 minutes to an hour for her to get her hands going in the morning. Has never been worked up for rheumatoid arthritis    htn -  Well controlled    pe- 10 years ago after car ride, does still take eliquis, denies any signs symptoms of DVT    hlp- needs rechecked    Needs labs    1. Dyspnea on exertion    2. Class 3 severe obesity due to excess calories without serious comorbidity with body mass index (BMI) of 40.0 to 44.9 in adult ([address]    3. Primary osteoarthritis of right knee    4. Polyarthritis    5.  Chronic bilateral low back pain, unspecified whether sciatica present         REVIEW OF SYMPTOMS    Review of Systems    PAST MEDICAL HISTORY  Past Medical History:   Diagnosis Date    Arthritis     generalized OA    DVT of lower extremity (deep venous thrombosis) ([address]     Left 2012    Fracture of left ankle 02/01/2004    GERD (gastroesophageal reflux disease)     H/O Doppler ultrasound 11/02/2014    Venous Doppler. Left lower extremity probably old DVT in the distal SFA and popliteal areas otherwise right lower extremity reveals normal findings.  H/O Doppler ultrasound 09/19/2017    LLE - chronic DVT, severe veous reflux    H/O echocardiogram 09/26/2016    EF60% mild MR, TR, mild pulm htn    H/O echocardiogram 07/18/2018    EF55-60% mild TR    Homocystinemia (HCC)     Hx of blood clots     \"hx of PE- 2013 and had DVT left leg that went to my lung\"    Hx of Doppler echocardiogram 12/22/2017    EF 50-60% mild biatrial enlargement, mild to mod TR, mildly elevated pulmonary artery pressure.  Hyperlipidemia     Hypertension, essential     Left leg swelling     wears compression stocking    Left Lower Extremity Venous Doppler ultrasound 09/16/2021    Evidence of partially occlusive chronic DVT in the left proximal and mid SFV.     Non-alcoholic fatty liver disease     Obesity     Osteoarthritis     Other chest pain 12/17/2018    Pulmonary embolism ([address] 2012    Bilateral    Spinal stenosis     lumbar spine--scheduled for epidural injection 3/19/2018    Varicose veins of lower extremity     Wears glasses        FAMILY HISTORY  Family History   Problem Relation Age of Onset    COPD Mother     Cancer Father         kidney    Heart Attack Father     Heart Disease Father     No Known Problems Sister     No Known Problems Sister        SOCIAL HISTORY  Social History     Socioeconomic History    Marital status:      Spouse name: Not on file    Number of children: 2    Years of education: Not on file    Highest education level: Not on file   Occupational History    Not on file   Tobacco Use    Smoking status: Never Smoker    Smokeless tobacco: Never Used   Vaping Use    Vaping Use: Never used   Substance and Sexual Activity    Alcohol use: Yes     Comment: average \"one time per [employer] Drug use: No    Sexual activity: Never   Other Topics Concern    Not on file   Social History Narrative    Not on file     Social Determinants of Health     Financial Resource Strain:     Difficulty of Paying Living Expenses: Not on file   Food Insecurity:     Worried About Running Out of Food in the Last Year: Not on file    Elle of Food in the Last Year: Not on file   Transportation Needs:     Lack of Transportation (Medical): Not on file    Lack of Transportation (Non-Medical): Not on file   Physical Activity:     Days of Exercise per Week: Not on file    Minutes of Exercise per Session: Not on file   Stress:     Feeling of Stress : Not on file   Social Connections:     Frequency of Communication with Friends and Family: Not on file    Frequency of Social Gatherings with Friends and Family: Not on file    Attends Restoration Services: Not on file    Active Member of [address] [place] [address] [employer] or Organizations: Not on file    Attends Club or Organization Meetings: Not on file    Marital Status: Not on file   Intimate Partner Violence:     Fear of Current or Ex-Partner: Not on file    Emotionally Abused: Not on file    Physically Abused: Not on file    Sexually Abused: Not on file   Housing Stability:     Unable to Pay for Housing in the Last Year: Not on file    Number of [place] in the Last Year: Not on file    Unstable Housing in the Last Year: Not on file        SURGICAL HISTORY  Past Surgical History:   Procedure Laterality Date    ANKLE SURGERY Left 02/2004    \"have plate and pins still\"    COLONOSCOPY  2010?     DILATION AND CURETTAGE OF UTERUS  May 2012    TONSILLECTOMY      age 10   [place] TOTAL KNEE ARTHROPLASTY Left 4/23/2019    KNEE TOTAL ARTHROPLASTY LEFT performed by Von Ledbetter DO at [address] EXTRACTION                   CURRENT MEDICATIONS  Current Outpatient Medications   Medication Sig Dispense Refill    tiZANidine (ZANAFLEX) 4 MG tablet Take 1 tablet by mouth 3 times daily as needed (back pain) 30 tablet 0    meloxicam (MOBIC) 7.5 MG tablet Take 0.5 tablets by mouth daily 45 tablet 0    metoprolol tartrate (LOPRESSOR) 25 MG tablet Take 1 tablet by mouth 2 times daily 60 tablet 5    rivaroxaban (XARELTO) 20 MG TABS tablet TAKE 1 TABLET BY MOUTH ONE TIME A DAY 90 tablet 1    losartan-hydroCHLOROthiazide (HYZAAR) 100-25 MG per tablet Take 1 tablet by mouth daily 90 tablet 1    folic acid (FOLVITE) 1 MG tablet Take 1 tablet by mouth daily 90 tablet 1    pravastatin (PRAVACHOL) 10 MG tablet Take 1 tablet by mouth daily 90 tablet 1    Elastic Bandages & Supports (MEDICAL COMPRESSION THIGH HIGH) MISC 1 Package by Does not apply route daily Wear daily and remove nightly   20 - 30 mmgh 1 each 0    aspirin 81 MG EC tablet Take 1 tablet by mouth daily 384 tablet 0    magnesium gluconate (MAGONATE) 500 MG tablet Take 500 mg by mouth Daily       No current facility-administered medications for this visit. ALLERGIES  Allergies   Allergen Reactions    Ace Inhibitors        PHYSICAL EXAM    /84 (Site: Right Upper Arm, Position: Sitting, Cuff Size: Medium Adult)   Pulse 62   Ht 5' 4\" (1.626 m)   Wt 256 lb 11.2 oz (116.4 kg)   LMP 01/16/2010   SpO2 96%   BMI 44.06 kg/m²     Physical Exam  Constitutional:       Appearance: Normal appearance. She is normal weight. HENT:      Head: Normocephalic and atraumatic. Right Ear: Tympanic membrane and external ear normal.      Left Ear: Tympanic membrane and external ear normal.      Nose: No rhinorrhea. Mouth/Throat:      Mouth: Mucous membranes are moist.      Pharynx: Oropharynx is clear. No oropharyngeal exudate or posterior oropharyngeal erythema. Eyes:      General: No scleral icterus. Extraocular Movements: Extraocular movements intact. Conjunctiva/sclera: Conjunctivae normal.      Pupils: Pupils are equal, round, and reactive to light.    Cardiovascular:      Rate and Rhythm: Normal rate and regular rhythm. Pulses: Normal pulses. Heart sounds: Normal heart sounds. No murmur heard. No friction rub. No gallop. Pulmonary:      Effort: Pulmonary effort is normal.      Breath sounds: No wheezing, rhonchi or rales. Comments: Decreased breath sounds, no wheezing or rhonchi  Abdominal:      General: Bowel sounds are normal. There is no distension. Palpations: Abdomen is soft. There is no mass. Tenderness: There is no abdominal tenderness. There is no right CVA tenderness, left CVA tenderness, guarding or rebound. Musculoskeletal:         General: Swelling and deformity present. Normal range of motion. Cervical back: Normal range of motion and neck supple. No rigidity. No muscular tenderness. Right lower leg: Edema present. Left lower leg: Edema present. Comments: Joints of hand are swollen   Skin:     General: Skin is warm and dry. Capillary Refill: Capillary refill takes less than 2 seconds. Findings: No bruising, erythema or rash. Neurological:      General: No focal deficit present. Mental Status: She is alert and oriented to person, place, and time. Coordination: Coordination normal.      Gait: Gait abnormal.   Psychiatric:         Mood and Affect: Mood normal.         Behavior: Behavior normal.         ASSESSMENT & PLAN    1. Dyspnea on exertion  Unclear etiology at this time, so far cardiac work-up has been normal, do not suspect acute PE, more likely I wonder if the scar tissue and areas of infarction from previous PE is causing restriction lung disease  - XR CHEST STANDARD (2 VW); Future  - T4, Free; Future  - CBC with Auto Differential; Future  - Comprehensive Metabolic Panel; Future  - Lipid, Fasting; Future  - Full PFT Study With Bronchodilator; Future    2. Class 3 severe obesity due to excess calories without serious comorbidity with body mass index (BMI) of 40.0 to 44.9 in adult [facility])    - Lipid, Fasting; Future    3.  Primary osteoarthritis of right knee      4. Polyarthritis  Patient having a lot of pain in her back and hands, would like to redo PT  I like to test her for rheumatoid arthritis  Discussed topical medications as she wants to stay away from NSAIDs and Tylenol  Will try tizanidine for back spasms  - RHEUMATOID FACTOR; Future  - CYCLIC CITRUL PEPTIDE ANTIBODY, IGG; Future  - tiZANidine (ZANAFLEX) 4 MG tablet; Take 1 tablet by mouth 3 times daily as needed (back pain)  Dispense: 30 tablet; Refill: 0    5. Chronic bilateral low back pain, unspecified whether sciatica present    - [facility]  - tiZANidine (ZANAFLEX) 4 MG tablet; Take 1 tablet by mouth 3 times daily as needed (back pain)  Dispense: 30 tablet; Refill: 0      Return if symptoms worsen or fail to improve. Electronically signed by Dennis Ferreira on 6/7/2022      Comment: Please note this report has been produced using speech recognition software and may contain errors related to that system including errors in grammar, punctuation, and spelling, as well as words and phrases that may be inappropriate. If there are any questions or concerns please feel free to contact the dictating provider for clarification.

## 2022-06-13 DIAGNOSIS — M13.0 POLYARTHRITIS: ICD-10-CM

## 2022-06-13 DIAGNOSIS — R06.09 DYSPNEA ON EXERTION: ICD-10-CM

## 2022-06-13 DIAGNOSIS — E66.01 CLASS 3 SEVERE OBESITY DUE TO EXCESS CALORIES WITHOUT SERIOUS COMORBIDITY WITH BODY MASS INDEX (BMI) OF 40.0 TO 44.9 IN ADULT (HCC): ICD-10-CM

## 2022-06-13 LAB
A/G RATIO: 1.5 (ref 1.1–2.2)
ALBUMIN SERPL-MCNC: 4 G/DL (ref 3.4–5)
ALP BLD-CCNC: 110 U/L (ref 40–129)
ALT SERPL-CCNC: 28 U/L (ref 10–40)
ANION GAP SERPL CALCULATED.3IONS-SCNC: 15 MMOL/L (ref 3–16)
AST SERPL-CCNC: 26 U/L (ref 15–37)
BASOPHILS ABSOLUTE: 0.1 K/UL (ref 0–0.2)
BASOPHILS RELATIVE PERCENT: 1.2 %
BILIRUB SERPL-MCNC: 0.5 MG/DL (ref 0–1)
BUN BLDV-MCNC: 18 MG/DL (ref 7–20)
CALCIUM SERPL-MCNC: 9.8 MG/DL (ref 8.3–10.6)
CHLORIDE BLD-SCNC: 100 MMOL/L (ref 99–110)
CHOLESTEROL, FASTING: 186 MG/DL (ref 0–199)
CO2: 23 MMOL/L (ref 21–32)
CREAT SERPL-MCNC: 1 MG/DL (ref 0.6–1.2)
EOSINOPHILS ABSOLUTE: 0.3 K/UL (ref 0–0.6)
EOSINOPHILS RELATIVE PERCENT: 4 %
GFR AFRICAN AMERICAN: >60
GFR NON-AFRICAN AMERICAN: 54
GLUCOSE BLD-MCNC: 105 MG/DL (ref 70–99)
HCT VFR BLD CALC: 43.8 % (ref 36–48)
HDLC SERPL-MCNC: 55 MG/DL (ref 40–60)
HEMOGLOBIN: 14.6 G/DL (ref 12–16)
LDL CHOLESTEROL CALCULATED: 107 MG/DL
LYMPHOCYTES ABSOLUTE: 1.7 K/UL (ref 1–5.1)
LYMPHOCYTES RELATIVE PERCENT: 23.1 %
MCH RBC QN AUTO: 28.6 PG (ref 26–34)
MCHC RBC AUTO-ENTMCNC: 33.2 G/DL (ref 31–36)
MCV RBC AUTO: 86.2 FL (ref 80–100)
MONOCYTES ABSOLUTE: 0.7 K/UL (ref 0–1.3)
MONOCYTES RELATIVE PERCENT: 9.7 %
NEUTROPHILS ABSOLUTE: 4.6 K/UL (ref 1.7–7.7)
NEUTROPHILS RELATIVE PERCENT: 62 %
PDW BLD-RTO: 14.2 % (ref 12.4–15.4)
PLATELET # BLD: 259 K/UL (ref 135–450)
PMV BLD AUTO: 9.2 FL (ref 5–10.5)
POTASSIUM SERPL-SCNC: 4.3 MMOL/L (ref 3.5–5.1)
RBC # BLD: 5.09 M/UL (ref 4–5.2)
RHEUMATOID FACTOR: <10 IU/ML
SODIUM BLD-SCNC: 138 MMOL/L (ref 136–145)
T4 FREE: 1.1 NG/DL (ref 0.9–1.8)
TOTAL PROTEIN: 6.6 G/DL (ref 6.4–8.2)
TRIGLYCERIDE, FASTING: 118 MG/DL (ref 0–150)
VLDLC SERPL CALC-MCNC: 24 MG/DL
WBC # BLD: 7.4 K/UL (ref 4–11)

## 2022-06-14 LAB — CYCLIC CITRULLINATED PEPTIDE ANTIBODY IGG: <0.5 U/ML (ref 0–2.9)

## 2022-06-18 ENCOUNTER — HOSPITAL ENCOUNTER (OUTPATIENT)
Dept: GENERAL RADIOLOGY | Age: 72
Discharge: HOME | End: 2022-06-18
Payer: COMMERCIAL

## 2022-06-18 ENCOUNTER — HOSPITAL ENCOUNTER (OUTPATIENT)
Age: 72
Discharge: HOME | End: 2022-06-18
Payer: COMMERCIAL

## 2022-06-18 DIAGNOSIS — R06.09 DYSPNEA ON EXERTION: ICD-10-CM

## 2022-06-18 PROCEDURE — 71046 X-RAY EXAM CHEST 2 VIEWS: CPT

## 2022-06-23 ENCOUNTER — HOSPITAL ENCOUNTER (OUTPATIENT)
Dept: PHYSICAL THERAPY | Age: 72
Setting detail: THERAPIES SERIES
Discharge: HOME | End: 2022-06-23
Payer: COMMERCIAL

## 2022-06-23 PROCEDURE — 97161 PT EVAL LOW COMPLEX 20 MIN: CPT

## 2022-06-23 PROCEDURE — 97110 THERAPEUTIC EXERCISES: CPT

## 2022-06-23 ASSESSMENT — PAIN DESCRIPTION - LOCATION: LOCATION: BACK

## 2022-06-23 ASSESSMENT — PAIN DESCRIPTION - ORIENTATION: ORIENTATION: LEFT;RIGHT

## 2022-06-23 ASSESSMENT — PAIN SCALES - GENERAL: PAINLEVEL_OUTOF10: 0

## 2022-06-23 ASSESSMENT — PAIN DESCRIPTION - FREQUENCY: FREQUENCY: INTERMITTENT

## 2022-06-23 ASSESSMENT — PAIN - FUNCTIONAL ASSESSMENT: PAIN_FUNCTIONAL_ASSESSMENT: PREVENTS OR INTERFERES SOME ACTIVE ACTIVITIES AND ADLS

## 2022-06-23 ASSESSMENT — PAIN DESCRIPTION - DESCRIPTORS: DESCRIPTORS: SPASM

## 2022-06-23 ASSESSMENT — PAIN DESCRIPTION - PAIN TYPE: TYPE: CHRONIC PAIN

## 2022-06-23 NOTE — PROGRESS NOTES
Physical Therapy: Initial Evaluation    Patient: Ana Chavez (63 y.o. female)   Examination Date:   Plan of Care Certification Period: 2022 to        :  1950 ;    Confirmed: Yes MRN: [id number]  CSN: [id number]   Insurance: Payor: Joe Lanier / Plan: [employer] [address] / Product Type: *No Product type* /   Insurance ID: [id number] - ([employer]) Secondary Insurance (if applicable):    Referring Physician: AGAPITO Crawford   PCP: Brandee Nagy MD Visits to Date/Visits Approved:   /      No Show/Cancelled Appts:   /       Medical Diagnosis: Low back pain, unspecified [M54.50]  Other chronic pain [G89.29] LBP  Treatment Diagnosis: Decreased tolerance to standing activity due to low back pain     PERTINENT MEDICAL HISTORY   Patient Assessed for Rehabilitation Services: Yes  Self reported health status[de-identified] Good    Medical History: Chart Reviewed: Yes   Past Medical History:   Diagnosis Date    Arthritis     generalized OA    DVT of lower extremity (deep venous thrombosis) ([facility] [address]     Left     Fracture of left ankle 2004    GERD (gastroesophageal reflux disease)     H/O Doppler ultrasound 2014    Venous Doppler. Left lower extremity probably old DVT in the distal SFA and popliteal areas otherwise right lower extremity reveals normal findings.  H/O Doppler ultrasound 2017    LLE - chronic DVT, severe veous reflux    H/O echocardiogram 2016    EF60% mild MR, TR, mild pulm htn    H/O echocardiogram 2018    EF55-60% mild TR    Homocystinemia (HCC)     Hx of blood clots     \"hx of PE-  and had DVT left leg that went to my lung\"    Hx of Doppler echocardiogram 2017    EF 50-60% mild biatrial enlargement, mild to mod TR, mildly elevated pulmonary artery pressure.     Hyperlipidemia     Hypertension, essential     Left leg swelling     wears compression stocking    Left Lower Extremity Venous Doppler ultrasound 09/16/2021    Evidence of partially occlusive chronic DVT in the left proximal and mid SFV.  Non-alcoholic fatty liver disease     Obesity     Osteoarthritis     Other chest pain 12/17/2018    Pulmonary embolism ([address] 2012    Bilateral    Spinal stenosis     lumbar spine--scheduled for epidural injection 3/19/2018    Varicose veins of lower extremity     Wears glasses      Surgical History:   Past Surgical History:   Procedure Laterality Date    ANKLE SURGERY Left 02/2004    \"have plate and pins still\"    COLONOSCOPY  2010?     DILATION AND CURETTAGE OF UTERUS  May 2012    TONSILLECTOMY      age 10   Floydene Ada TOTAL KNEE ARTHROPLASTY Left 4/23/2019    KNEE TOTAL ARTHROPLASTY LEFT performed by Beulah Reyes DO at [address] EXTRACTION         Medications:   Current Outpatient Medications:     tiZANidine (ZANAFLEX) 4 MG tablet, Take 1 tablet by mouth 3 times daily as needed (back pain), Disp: 30 tablet, Rfl: 0    meloxicam (MOBIC) 7.5 MG tablet, Take 0.5 tablets by mouth daily, Disp: 45 tablet, Rfl: 0    metoprolol tartrate (LOPRESSOR) 25 MG tablet, Take 1 tablet by mouth 2 times daily, Disp: 60 tablet, Rfl: 5    rivaroxaban (XARELTO) 20 MG TABS tablet, TAKE 1 TABLET BY MOUTH ONE TIME A DAY, Disp: 90 tablet, Rfl: 1    losartan-hydroCHLOROthiazide (HYZAAR) 100-25 MG per tablet, Take 1 tablet by mouth daily, Disp: 90 tablet, Rfl: 1    folic acid (FOLVITE) 1 MG tablet, Take 1 tablet by mouth daily, Disp: 90 tablet, Rfl: 1    pravastatin (PRAVACHOL) 10 MG tablet, Take 1 tablet by mouth daily, Disp: 90 tablet, Rfl: 1    Elastic Bandages & Supports (MEDICAL COMPRESSION THIGH HIGH) MISC, 1 Package by Does not apply route daily Wear daily and remove nightly  20 - 30 mmgh, Disp: 1 each, Rfl: 0    aspirin 81 MG EC tablet, Take 1 tablet by mouth daily, Disp: 384 tablet, Rfl: 0    magnesium gluconate (MAGONATE) 500 MG tablet, Take 500 mg by mouth Daily, Disp: , Rfl:   Allergies: Ace inhibitors SUBJECTIVE EXAMINATION     History obtained from[de-identified] Chart Review,Patient,      Family/Caregiver Present: No    Subjective History:    Subjective: Patient repors no previous injury or surgery, reports gradual increase in pain. She used to play golf, but stopped two years ago. Worse pain with walking and standing, half bending over. She tried one round of physical therapy 4 years ago, does not believe she gave her 100%. Ease with rest. Has not recently had imaigng completed. Had lumbar injections a few years ago, minimal relief. Has not been doing any exercises or stretching. 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 Difficulty with standing >5 minutes. Goals to be able to walk for exercise.   Additional Pertinent Hx (if applicable):     Previous treatments prior to current episode?: Injections,Outpatient PT  Any changes to allergies, medications, or have you had any medical procedures/ER visits since your last visit?: No      Learning/Language: Learning  Does the patient/guardian have any barriers to learning?: No barriers  Will there be a co-learner?: No  What is the preferred language of the patient/guardian?: English  Is an  required?: No  How does the patient/guardian prefer to learn new concepts?: Listening,Reading,Demonstration     Pain Screening   Pain Screening  Patient Currently in Pain: Yes  Pain Assessment: 0-10  Pain Level: 0  Best Pain Level: 0  Worst Pain Level: 8  Patient's Stated Pain Goal: 2  Pain Type: Chronic pain  Pain Location: Back  Pain Orientation: Left,Right  Pain Radiating Towards: No N/T noted  Pain Descriptors: Spasm  Pain Frequency: Intermittent  Functional Pain Assessment: Prevents or interferes some active activities and ADLs  Aggravating factors: Standing,Walking    Functional Status       Prior Level of Function:  Grooming: Independent/No Functional Limitation  Bathing: Independent/No Functional Limitation  Upper Body Dressing: Independent/No Functional Limitation  Lower Body Dressing: Independent/No Functional Limitation  Don/doff Socks/Shoes: Independent/No Functional Limitation  Toileting: Independent/No Functional Limitation  Self Care: Independent/No Functional Limitation  Bed Mobility: Required Assist/Some Functional Limitations  Transfers (sit to stand): Independent/No Functional Limitation  Transfers (stand to sit): Independent/No Functional Limitation  Walking: Required increased time  Stairs: Required increased time  ADLs: Independent/No Functional Limitation    Current Level of Function:      Grooming: Independent/No Functional Limitation  Bathing: Independent/No Functional Limitation  Upper Body Dressing: Independent/No Functional Limitation  Lower Body Dressing: Independent/No Functional Limitation  Don/doff Socks/Shoes: Independent/No Functional Limitation  Toileting: Independent/No Functional Limitation  Self Care:  Independent/No Functional Limitation  Bed Mobility: Required increased time  Transfers (sit to stand): Required increased time  Transfers (stand to sit): Required increased time  Walking: Required increased time  Stairs: Required increased time      OBJECTIVE EXAMINATION     Review of Systems:  Vision: Within Functional Limits  Hearing: Within functional limits  Follows Commands: Within Functional Limits    VBI Screening / Lumbar Screening:   Bowel/bladder disturbances: No  Saddle anesthesia: No  Unexplained weight loss: No  Severe motor weakness: No  Stumbling or giving way while walking: No  Unrelenting pain at night: No    Observations:  General Observations  Description: Patient ambulates without AD and no antalgic gait noted initially, does worsen with prolonged standing    Palpation:   Lumbar Spine Palpation: Noted hypertonicity of lumbar/thoracic paraspinals, piriformis (R > L)    Left AROM  Right AROM       WNL     WNL        Left PROM  Right PROM      General PROM LE: Right WFL,Left WFL    General PROM LE: Right WFL,Left WFL        Left Strength  Right Strength         Strength LLE  L Hip Flexion: 5/5  L Hip ABduction: 5/5  L Hip ADduction: 5/5  L Hip Internal Rotation: 5/5  L Hip External Rotation: 5/5  L Knee Flexion: 5/5  L Knee Extension: 5/5    Strength RLE  R Hip Flexion: 5/5  R Hip ABduction: 5/5  R Hip ADduction: 5/5  R Hip Internal Rotation: 5/5  R Hip External Rotation: 5/5  R Knee Flexion: 5/5  R Knee Extension: 5/5     Lumbar Assessment   AROM Lumbar Spine   Lumbar spine general AROM: Flexion: able to reach bottom of shins, increased stretch, increased pain in LB with returning to neutral; extension:no increased pain; rotation: able to clear ASIS, no increased pain       Trunk Strength           Muscle Length/Flexibility: Flexibility: noted significant moderate limitations in hip flexor mobility, minimal limitations in hamstring mobility    Joint Mobility (if applicable): unable to assess due to pt being unable to assume prone positioning     Special Tests:   Special Tests: (-) JASON ARGUELLO (-), (+) Monica test bilateral    Additional Finding(s) (if applicable): Other: 30 sec STS: able to complete 8 times with 2 UE assist       ASSESSMENT     Impression: Assessment: Pt is 66year old female with gradual worsening of chronic low back pain; no previous injuries or surgeries reported. Had injections several years ago with minimal relief. Pt now has difficulties with prolonged standing/walking >5 minutes, rolling in bed, and completing a walking program increases her pain. Pt demo deficits this date that include reduced hip abductor and glute max activation, tightness of lumbar/thoracic paraspinals, decreased ability to complete sit to stand transfers, limited endurance due to pain and SOB. Pt will benefit with PT services with core stability, TA activation, swiss ball exercises, hamstring/hip flexor/quadriceps stretching, BLE strengthening, modalities as needed to return to PLOF.  Pt prior to onset of current condition had min pain with able to complete full ADLs and work activities. Body Structures, Functions, Activity Limitations Requiring Skilled Therapeutic Intervention: Decreased functional mobility ,Decreased ADL status,Decreased endurance,Increased pain,Decreased ROM,Decreased balance,Decreased posture,Decreased strength    Statement of Medical Necessity: Physical Therapy is both indicated and medically necessary as outlined in the POC to increase the likelihood of meeting the functionally related goals stated below.      Patient's Activity Tolerance: Patient tolerated evaluation without incident      Patient's rehabilitation potential/prognosis is considered to be: Good    Factors which may impact rehabilitation potential include: None  Measures taken to address barrier(s): N/A     GOALS   Patient Goal(s): improve pain and start walking for exercise  Short Term Goals Completed by 5 weeks Goal Status   Pt demo I w/ HEP and symptom management New   Pt demo Oswestry score <40% disability to improve tolerance to ADL's New   Pt demo ability to complete >9 sit to stands in 30 seconds without UE assist New   Pt demo WNL hamstring and hip flexor mobility to improve tolerance to bed mobility New   Pt demo 5/5 BLE strength in all directions to improve tolerance to functional transfers New                                        TREATMENT PLAN       Requires PT Follow-Up: Yes    Pt. actively involved in establishing Plan of Care and Goals: Yes  Patient/ Caregiver education and instruction: Devika Bustillo of Care,Evaluative findings,Home Exercise Program,Posture,Pressure Relief,Anatomy of condition,Disease Specific Education         Treatment may include any combination of the following: Strengthening,ROM,Balance training,Stair training,Neuromuscular re-education,Modalities,Pain management,Home exercise program,Manual Therapy - Soft Tissue Mobilization,Manual Therapy - Joint Manipulation,Endurance training,Therapeutic activities     Frequency / Duration:  Patient to be seen 1x for 8 weeks weeks      Eval Complexity:    Decision Making: Low Complexity     Therapist Signature: Myrna Wooster, PT    Date: 8/07/4215     I certify that the above Therapy Services are being furnished while the patient is under my care. I agree with the treatment plan and certify that this therapy is necessary. [de-identified] Signature:  ___________________________   Date:_______                                                                   AGAPITO Brown        Physician Comments: _______________________________________________    Please sign and return to [address]. Please fax to the location listed below.  Indu Trinidad for this referral!    [address] [facility] [address], [address] [address]  Dept: [phone]  Loc: [phone]

## 2022-06-23 NOTE — PLAN OF CARE
Outpatient Physical Therapy           [place]           [] Phone: [phone]   Fax: [phone]  [facility]           [] Phone: [phone]   Fax: [phone]     To: AGAPITO Torres   From: Sarthak Hayes PT, PT     Patient: Carloz Manzo       : 1950  Diagnosis: Low back pain, unspecified [M54.50]  Other chronic pain [G89.29] Diagnosis: LBP  Treatment Diagnosis: Decreased tolerance to standing activity due to low back pain  Date: 2022    Physical Therapy Certification/Re-Certification Form  Dear Robert ALTMAN,  The following patient has been evaluated for physical therapy services and for therapy to continue, insurance requires physician review of the treatment plan initially and every 90 days. Please review the attached evaluation and/or summary of the patient's plan of care, and verify that you agree therapy should continue by signing the attached document and sending it back to our office. Assessment:    Assessment: Pt is 66year old female with gradual worsening of chronic low back pain; no previous injuries or surgeries reported. Had injections several years ago with minimal relief. Pt now has difficulties with prolonged standing/walking >5 minutes, rolling in bed, and completing a walking program increases her pain. Pt demo deficits this date that include reduced hip abductor and glute max activation, tightness of lumbar/thoracic paraspinals, decreased ability to complete sit to stand transfers, limited endurance due to pain and SOB. Pt will benefit with PT services with core stability, TA activation, swiss ball exercises, hamstring/hip flexor/quadriceps stretching, BLE strengthening, modalities as needed to return to OF. Pt prior to onset of current condition had min pain with able to complete full ADLs and work activities.     Plan of Care/Treatment to date:  [x] Therapeutic Exercise  [] Modalities:  [x] Therapeutic Activity     [] Ultrasound  [] Electrical Stimulation  [] Gait Training      [] Cervical Traction [] Lumbar Traction  [x] Neuromuscular Re-education    [x] Cold/hotpack [] Iontophoresis   [x] Instruction in HEP      [] Vasopneumatic    [] Dry Needling  [x] Manual Therapy               [] Aquatic Therapy       Other:          Frequency/Duration:  # Days per week: [x] 1 day # Weeks: [] 1 week [] 5 weeks     [] 2 days   [] 2 weeks [] 6 weeks     [] 3 days   [] 3 weeks [] 7 weeks     [] 4 days   [] 4 weeks [x] 8 weeks         [] 9 weeks [] 10 weeks         [] 11 weeks [] 12 weeks    Rehab Potential/Progress: [] Excellent [x] Good [] Fair  [] Poor     Goals:    Patient goals: improve pain and start walking for exercise  Short term goals  Time Frame for Short term goals: 5 weeks  Pt demo I w/ HEP and symptom management  Pt demo Oswestry score <40% disability to improve tolerance to ADL's  Pt demo ability to complete >9 sit to stands in 30 seconds without UE assist  Pt demo WNL hamstring and hip flexor mobility to improve tolerance to bed mobility  Pt demo 5/5 BLE strength in all directions to improve tolerance to functional transfers        Electronically signed by:  Naomie Rand, PT, DPT, CSCS 6/23/2022, 8:11 AM        If you have any questions or concerns, please don't hesitate to call.   Thank you for your referral.      Physician Signature:________________________________Date:_________ TIME: _____  By signing above, therapists plan is approved by physician

## 2022-06-23 NOTE — FLOWSHEET NOTE
Outpatient Physical Therapy  [place]           [x] Phone: [phone]   Fax: [phone]  [facility]           [] Phone: [phone]   Fax: [phone]        Physical Therapy Daily Treatment Note  Date:  2022    Patient Name:  Gabbie Umana    :  1950  MRN: [id number]  Restrictions/Precautions: NONE  Diagnosis:   Low back pain, unspecified [M54.50]  Other chronic pain [G89.29] Diagnosis: LBP  Date of Injury/Surgery: --  Treatment Diagnosis:  Decreased tolerance to standing activity due to low back pain  Insurance/Certification information: [employer] pcy  Referring Physician:  AGAPITO Juarez   PCP: Jim Silva MD  Next Doctor Visit:  --  Plan of care signed (Y/N):  N, sent 22  Outcome Measure: Oswestry: see folder  Visit# / total visits:   1/10  Pain level: 010   Goals:     Patient goals: improve pain and start walking for exercise  Short term goals  Time Frame for Short term goals: 5 weeks  Pt demo I w/ HEP and symptom management  Pt demo Oswestry score <40% disability to improve tolerance to ADL's  Pt demo ability to complete >9 sit to stands in 30 seconds without UE assist  Pt demo WNL hamstring and hip flexor mobility to improve tolerance to bed mobility  Pt demo 5/5 BLE strength in all directions to improve tolerance to functional transfers      Summary of Evaluation:  Assessment: Pt is 66year old female with gradual worsening of chronic low back pain; no previous injuries or surgeries reported. Had injections several years ago with minimal relief. Pt now has difficulties with prolonged standing/walking >5 minutes, rolling in bed, and completing a walking program increases her pain. Pt demo deficits this date that include reduced hip abductor and glute max activation, tightness of lumbar/thoracic paraspinals, decreased ability to complete sit to stand transfers, limited endurance due to pain and SOB.  Pt will benefit with PT services with core stability, TA activation, swiss ball exercises, hamstring/hip flexor/quadriceps stretching, BLE strengthening, modalities as needed to return to PLOF. Pt prior to onset of current condition had min pain with able to complete full ADLs and work activities. Subjective:  See renée         Any changes in Ambulatory Summary Sheet? None        Objective:  See eval   COVID screening questions were asked and patient attested that there had been no contact or symptoms        Exercises: (No more than 4 columns)   Exercise/Equipment 6/23/22 #1 Date Date           WARM UP      Nu Step               TABLE      *PPT      *SKTC      *SLR      Supine hip flexor stretch      Hamstring stretch      Supine March      SL Clamshells      STANDING      Pallof Press      TB Row                                         PROPRIOCEPTION                                    MODALITIES                      Other Therapeutic Activities/Education:  Patient received education on their current pathology and how their condition effects them with their functional activities. Patient understood discussion and questions were answered. Patient understands their activity limitations and understands rational for treatment progression. Home Exercise Program:  HO issued, reviewed and discussed with patient. Pt agreed to comply. Manual Treatments:  --      Modalities:  --      Communication with other providers:  shashial sent 6/23/22      Assessment:  (Response towards treatment session) (Pain Rating)  Assessment: Pt is 66year old female with gradual worsening of chronic low back pain; no previous injuries or surgeries reported. Had injections several years ago with minimal relief. Pt now has difficulties with prolonged standing/walking >5 minutes, rolling in bed, and completing a walking program increases her pain.  Pt demo deficits this date that include reduced hip abductor and glute max activation, tightness of lumbar/thoracic paraspinals, decreased ability to complete sit to stand transfers, limited endurance due to pain and SOB. Pt will benefit with PT services with core stability, TA activation, swiss ball exercises, hamstring/hip flexor/quadriceps stretching, BLE strengthening, modalities as needed to return to PLOF. Pt prior to onset of current condition had min pain with able to complete full ADLs and work activities.       Plan for Next Session: --       Time In / Time Out:    5350-2159      Timed Code/Total Treatment Minutes:  36'   (1) PT eval   (1) TE      Next Progress Note due:  10th visit    Plan of Care Interventions:  [x] Therapeutic Exercise  [] Modalities:  [x] Therapeutic Activity     [] Ultrasound  [] Estim  [] Gait Training      [] Cervical Traction [] Lumbar Traction  [x] Neuromuscular Re-education    [x] Cold/hotpack [] Iontophoresis   [x] Instruction in HEP      [] Vasopneumatic   [] Dry Needling    [x] Manual Therapy               [] Aquatic Therapy              Electronically signed by:  Cathy Alexandra, PT, DPT, CSCS 6/23/2022, 8:13 AM

## 2022-06-30 ENCOUNTER — HOSPITAL ENCOUNTER (OUTPATIENT)
Dept: PHYSICAL THERAPY | Age: 72
Setting detail: THERAPIES SERIES
Discharge: HOME | End: 2022-06-30
Payer: COMMERCIAL

## 2022-06-30 PROCEDURE — 97530 THERAPEUTIC ACTIVITIES: CPT

## 2022-06-30 PROCEDURE — 97110 THERAPEUTIC EXERCISES: CPT

## 2022-06-30 NOTE — FLOWSHEET NOTE
Outpatient Physical Therapy  [place]           [x] Phone: [phone]   Fax: [phone]  [facility] [place]           [] Phone: [phone]   Fax: [phone]        Physical Therapy Daily Treatment Note  Date:  2022    Patient Name:  Davida Read    :  1950  MRN: [id number]  Restrictions/Precautions: NONE  Diagnosis:   Low back pain, unspecified [M54.50]  Other chronic pain [G89.29] Diagnosis: LBP  Date of Injury/Surgery: --  Treatment Diagnosis:  Decreased tolerance to standing activity due to low back pain  Insurance/Certification information: [facility] 30 pcy  Referring Physician:  AGAPITO Mcgraw   PCP: Yoly Tan MD  Next Doctor Visit:  --  Plan of care signed (Y/N):  N, sent 22  Outcome Measure: Oswestry: see folder  Visit# / total visits:   2/10  Pain level: 0/10   Goals:     Patient goals: improve pain and start walking for exercise  Short term goals  Time Frame for Short term goals: 5 weeks  Pt demo I w/ HEP and symptom management  Pt demo Oswestry score <40% disability to improve tolerance to ADL's  Pt demo ability to complete >9 sit to stands in 30 seconds without UE assist  Pt demo WNL hamstring and hip flexor mobility to improve tolerance to bed mobility  Pt demo 5/5 BLE strength in all directions to improve tolerance to functional transfers      Summary of Evaluation:  Assessment: Pt is 66year old female with gradual worsening of chronic low back pain; no previous injuries or surgeries reported. Had injections several years ago with minimal relief. Pt now has difficulties with prolonged standing/walking >5 minutes, rolling in bed, and completing a walking program increases her pain. Pt demo deficits this date that include reduced hip abductor and glute max activation, tightness of lumbar/thoracic paraspinals, decreased ability to complete sit to stand transfers, limited endurance due to pain and SOB.  Pt will benefit with PT services with core stability, TA activation, swiss ball exercises, hamstring/hip flexor/quadriceps stretching, BLE strengthening, modalities as needed to return to PLOF. Pt prior to onset of current condition had min pain with able to complete full ADLs and work activities. Subjective:  Pt arrives to tx session reporting 0/10 pain in LB as long as she isn't on her feet; relates 7/10 when standing for greater than 5min. States compliance with HEP. Any changes in Ambulatory Summary Sheet? None        Objective:  See eval   COVID screening questions were asked and patient attested that there had been no contact or symptoms        Exercises: (No more than 4 columns)   Exercise/Equipment 6/23/22 #1 6/30/22 #2 Date           WARM UP      Nu Step     L4 5'          TABLE      *PPT  2x10 3\" hold    *SKTC  30\" x2 ea LE    *SLR  2x10    Supine hip flexor stretch  30\" x2 ea LE    Hamstring stretch  30\" x2 ea LE    Supine March  2x10     SL Clamshells  X10 RTB    DKTC  2x10 GSB                STANDING      Pallof Press  2x10 RTB    TB Row  2x10 RTB                                       PROPRIOCEPTION                                    MODALITIES                      Other Therapeutic Activities/Education:  Patient received education on their current pathology and how their condition effects them with their functional activities. Patient understood discussion and questions were answered. Patient understands their activity limitations and understands rational for treatment progression. Home Exercise Program:  HO issued, reviewed and discussed with patient. Pt agreed to comply. Manual Treatments:  --      Modalities:  --      Communication with other providers:  renée sent 6/23/22      Assessment:  Pt tolerates tx session well without adverse reactions or complications. Tolerates added exercises well without increased pain.  Pt will continue to benefit from PT services with core stability, TA activation, swiss ball exercises, hamstring/hip flexor/quadriceps stretching,  End pain: same       Assessment: Pt is 66year old female with gradual worsening of chronic low back pain; no previous injuries or surgeries reported. Had injections several years ago with minimal relief. Pt now has difficulties with prolonged standing/walking >5 minutes, rolling in bed, and completing a walking program increases her pain. Pt demo deficits this date that include reduced hip abductor and glute max activation, tightness of lumbar/thoracic paraspinals, decreased ability to complete sit to stand transfers, limited endurance due to pain and SOB. Pt will benefit with PT services with core stability, TA activation, swiss ball exercises, hamstring/hip flexor/quadriceps stretching, BLE strengthening, modalities as needed to return to [facility]. Pt prior to onset of current condition had min pain with able to complete full ADLs and work activities.       Plan for Next Session: --       Time In / Time Out:    0529 / 1730      Timed Code/Total Treatment Minutes:   39' / 39'    2 TE    1 TA      Next Progress Note due:  10th visit    Plan of Care Interventions:  [x] Therapeutic Exercise  [] Modalities:  [x] Therapeutic Activity     [] Ultrasound  [] Estim  [] Gait Training      [] Cervical Traction [] Lumbar Traction  [x] Neuromuscular Re-education    [x] Cold/hotpack [] Iontophoresis   [x] Instruction in HEP      [] Vasopneumatic   [] Dry Needling    [x] Manual Therapy               [] Aquatic Therapy              Electronically signed by:  Addie Paz PTA,      6/30/2022, 4:30 PM

## 2022-07-05 ENCOUNTER — HOSPITAL ENCOUNTER (OUTPATIENT)
Dept: PULMONOLOGY | Age: 72
Discharge: HOME | End: 2022-07-05
Payer: COMMERCIAL

## 2022-07-05 DIAGNOSIS — R06.09 DYSPNEA ON EXERTION: ICD-10-CM

## 2022-07-05 LAB
DLCO %PRED: 68 %
DLCO PRED: NORMAL
DLCO/VA %PRED: NORMAL
DLCO/VA PRED: NORMAL
DLCO/VA: NORMAL
DLCO: NORMAL
EXPIRATORY TIME-POST: NORMAL
EXPIRATORY TIME: NORMAL
FEF 25-75% %CHNG: NORMAL
FEF 25-75% %PRED-POST: NORMAL
FEF 25-75% %PRED-PRE: NORMAL
FEF 25-75% PRED: NORMAL
FEF 25-75%-POST: NORMAL
FEF 25-75%-PRE: NORMAL
FEV1 %PRED-POST: 70 %
FEV1 %PRED-PRE: 67 %
FEV1 PRED: NORMAL
FEV1-POST: NORMAL
FEV1-PRE: NORMAL
FEV1/FVC %PRED-POST: NORMAL
FEV1/FVC %PRED-PRE: NORMAL
FEV1/FVC PRED: NORMAL
FEV1/FVC-POST: 80 %
FEV1/FVC-PRE: 78 %
FVC %PRED-POST: NORMAL
FVC %PRED-PRE: NORMAL
FVC PRED: NORMAL
FVC-POST: NORMAL
FVC-PRE: NORMAL
GAW %PRED: NORMAL
GAW PRED: NORMAL
GAW: NORMAL
IC %PRED: NORMAL
IC PRED: NORMAL
IC: NORMAL
MEP: NORMAL
MIP: NORMAL
MVV %PRED-PRE: NORMAL
MVV PRED: NORMAL
MVV-PRE: NORMAL
PEF %PRED-POST: NORMAL
PEF %PRED-PRE: NORMAL
PEF PRED: NORMAL
PEF%CHNG: NORMAL
PEF-POST: NORMAL
PEF-PRE: NORMAL
RAW %PRED: NORMAL
RAW PRED: NORMAL
RAW: NORMAL
RV %PRED: NORMAL
RV PRED: NORMAL
RV: NORMAL
SVC %PRED: NORMAL
SVC PRED: NORMAL
SVC: NORMAL
TLC %PRED: 73 %
TLC PRED: NORMAL
TLC: NORMAL
VA %PRED: NORMAL
VA PRED: NORMAL
VA: NORMAL
VTG %PRED: NORMAL
VTG PRED: NORMAL
VTG: NORMAL

## 2022-07-05 PROCEDURE — 94060 EVALUATION OF WHEEZING: CPT

## 2022-07-05 PROCEDURE — 94726 PLETHYSMOGRAPHY LUNG VOLUMES: CPT

## 2022-07-05 PROCEDURE — 94729 DIFFUSING CAPACITY: CPT

## 2022-07-05 ASSESSMENT — PULMONARY FUNCTION TESTS
FEV1/FVC_PRE: 78
FEV1_PERCENT_PREDICTED_POST: 70
FEV1/FVC_POST: 80
FEV1_PERCENT_PREDICTED_PRE: 67

## 2022-07-07 ENCOUNTER — HOSPITAL ENCOUNTER (OUTPATIENT)
Dept: PHYSICAL THERAPY | Age: 72
Setting detail: THERAPIES SERIES
Discharge: HOME | End: 2022-07-07
Payer: COMMERCIAL

## 2022-07-07 DIAGNOSIS — R06.09 DOE (DYSPNEA ON EXERTION): Primary | ICD-10-CM

## 2022-07-07 DIAGNOSIS — R94.2 ABNORMAL PFTS (PULMONARY FUNCTION TESTS): ICD-10-CM

## 2022-07-07 PROCEDURE — 97112 NEUROMUSCULAR REEDUCATION: CPT

## 2022-07-07 PROCEDURE — 97110 THERAPEUTIC EXERCISES: CPT

## 2022-07-07 NOTE — FLOWSHEET NOTE
Outpatient Physical Therapy  [place]           [x] Phone: [phone]   Fax: [phone]  [facility]           [] Phone: [phone]   Fax: [phone]        Physical Therapy Daily Treatment Note  Date:  2022    Patient Name:  Ping Monte    :  1950  MRN: [id number]  Restrictions/Precautions: NONE  Diagnosis:   Low back pain, unspecified [M54.50]  Other chronic pain [G89.29] Diagnosis: LBP  Date of Injury/Surgery: --  Treatment Diagnosis:  Decreased tolerance to standing activity due to low back pain  Insurance/Certification information: [facility] 30 pcy  Referring Physician:  AGAPITO Covington   PCP: Ashley Meckel, MD  Next Doctor Visit:  --  Plan of care signed (Y/N):  Y, sent 22  Outcome Measure: Oswestry: see folder  Visit# / total visits:   3/10  Pain level: 0/10   Goals:     Patient goals: improve pain and start walking for exercise  Short term goals  Time Frame for Short term goals: 5 weeks  Pt demo I w/ HEP and symptom management  Pt demo Oswestry score <40% disability to improve tolerance to ADL's  Pt demo ability to complete >9 sit to stands in 30 seconds without UE assist  Pt demo WNL hamstring and hip flexor mobility to improve tolerance to bed mobility  Pt demo 5/5 BLE strength in all directions to improve tolerance to functional transfers      Summary of Evaluation:  Assessment: Pt is 66year old female with gradual worsening of chronic low back pain; no previous injuries or surgeries reported. Had injections several years ago with minimal relief. Pt now has difficulties with prolonged standing/walking >5 minutes, rolling in bed, and completing a walking program increases her pain. Pt demo deficits this date that include reduced hip abductor and glute max activation, tightness of lumbar/thoracic paraspinals, decreased ability to complete sit to stand transfers, limited endurance due to pain and SOB.  Pt will benefit with PT services with core stability, TA activation, swiss ball exercises, hamstring/hip flexor/quadriceps stretching, BLE strengthening, modalities as needed to return to PLOF. Pt prior to onset of current condition had min pain with able to complete full ADLs and work activities. Subjective:  Patient reports minimal pain this morning, states it bothers her only when standing for prolonged periods of time. Any changes in Ambulatory Summary Sheet? None      Objective:  See eval   COVID screening questions were asked and patient attested that there had been no contact or symptoms    Cues for TA hold throughout other activities   Min quad lag with SLR  Difficulties with maintaining core brace throughout sit to stand; had patient relax and re-brace at the top of the sit to stand      Exercises: (No more than 4 columns)   Exercise/Equipment 6/23/22 #1 6/30/22 #2 7/7/22 #3           WARM UP      Nu Step     L4 5' Lv4 5'         TABLE      *PPT  2x10 3\" hold x10 3\"   *SKTC  30\" x2 ea LE 30\" x2 ea side w/ towel under thigh   *SLR  2x10 2x10 w/ TA hold   Supine hip flexor stretch  30\" x2 ea LE 30\" x2 ea side, min OP from therapist   Hamstring stretch  30\" x2 ea LE 20\" x2 seated    Supine March  2x10  2x20 w/ TA hold   SL Clamshells  X10 RTB 2x10 RTB in SL   DKTC  2x10 GSB --               STANDING      Pallof Press  2x10 RTB 2x10 RTB   TB Row  2x10 RTB 2x10 RTB   STS   x10 cues for core brace during                                PROPRIOCEPTION                                    MODALITIES                      Other Therapeutic Activities/Education:  Patient received education on their current pathology and how their condition effects them with their functional activities. Patient understood discussion and questions were answered. Patient understands their activity limitations and understands rational for treatment progression. Home Exercise Program:  HO issued, reviewed and discussed with patient. Pt agreed to comply.         Manual Treatments:  --      Modalities:  --      Communication with other providers:  shashial sent 6/23/22      Assessment:  Lisa Wright demonstrates fair tolerance to today's session. She requires seated rest breaks between standing activities due to increased pain in her low back. Able to tolerate all table exercises without increased pain. Will continue to progress standing activity as tolerated. End pain:  1/10      Assessment: Pt is 66year old female with gradual worsening of chronic low back pain; no previous injuries or surgeries reported. Had injections several years ago with minimal relief. Pt now has difficulties with prolonged standing/walking >5 minutes, rolling in bed, and completing a walking program increases her pain. Pt demo deficits this date that include reduced hip abductor and glute max activation, tightness of lumbar/thoracic paraspinals, decreased ability to complete sit to stand transfers, limited endurance due to pain and SOB. Pt will benefit with PT services with core stability, TA activation, swiss ball exercises, hamstring/hip flexor/quadriceps stretching, BLE strengthening, modalities as needed to return to PLOF. Pt prior to onset of current condition had min pain with able to complete full ADLs and work activities.       Plan for Next Session: --       Time In / Time Out:    7762-4850      Timed Code/Total Treatment Minutes:   39'   (2) TE   (1) neuro      Next Progress Note due:  10th visit    Plan of Care Interventions:  [x] Therapeutic Exercise  [] Modalities:  [x] Therapeutic Activity     [] Ultrasound  [] Estim  [] Gait Training      [] Cervical Traction [] Lumbar Traction  [x] Neuromuscular Re-education    [x] Cold/hotpack [] Iontophoresis   [x] Instruction in HEP      [] Vasopneumatic   [] Dry Needling    [x] Manual Therapy               [] Aquatic Therapy              Electronically signed by:  Lorene Phillips, PT, DPT, CSCS      7/7/2022, 6:33 AM

## 2022-07-14 ENCOUNTER — HOSPITAL ENCOUNTER (OUTPATIENT)
Dept: PHYSICAL THERAPY | Age: 72
Setting detail: THERAPIES SERIES
Discharge: HOME | End: 2022-07-14
Payer: COMMERCIAL

## 2022-07-14 PROCEDURE — 97140 MANUAL THERAPY 1/> REGIONS: CPT

## 2022-07-14 PROCEDURE — 97112 NEUROMUSCULAR REEDUCATION: CPT

## 2022-07-14 PROCEDURE — 97110 THERAPEUTIC EXERCISES: CPT

## 2022-07-14 NOTE — FLOWSHEET NOTE
Outpatient Physical Therapy  [place]           [x] Phone: [phone]   Fax: [phone]  [facility]           [] Phone: [phone]   Fax: [phone]        Physical Therapy Daily Treatment Note  Date:  2022    Patient Name:  Charla Santana    :  1950  MRN: [id number]  Restrictions/Precautions: NONE  Diagnosis:   Low back pain, unspecified [M54.50]  Other chronic pain [G89.29] Diagnosis: LBP  Date of Injury/Surgery: --  Treatment Diagnosis:  Decreased tolerance to standing activity due to low back pain  Insurance/Certification information: [facility] 30 pcy  Referring Physician:  AGAPITO Foreman   PCP: Whitfield Hashimoto, MD  Next Doctor Visit:  --  Plan of care signed (Y/N):  Y, sent 22  Outcome Measure: Oswestry: see folder  Visit# / total visits:   4/10  Pain level: 0/10 Currently  6/10 after standing > 15'   Goals:     Patient goals: improve pain and start walking for exercise  Short term goals  Time Frame for Short term goals: 5 weeks  Pt demo I w/ HEP and symptom management  Pt demo Oswestry score <40% disability to improve tolerance to ADL's  Pt demo ability to complete >9 sit to stands in 30 seconds without UE assist  Pt demo WNL hamstring and hip flexor mobility to improve tolerance to bed mobility  Pt demo 5/5 BLE strength in all directions to improve tolerance to functional transfers      Summary of Evaluation:  Assessment: Pt is 66year old female with gradual worsening of chronic low back pain; no previous injuries or surgeries reported. Had injections several years ago with minimal relief. Pt now has difficulties with prolonged standing/walking >5 minutes, rolling in bed, and completing a walking program increases her pain. Pt demo deficits this date that include reduced hip abductor and glute max activation, tightness of lumbar/thoracic paraspinals, decreased ability to complete sit to stand transfers, limited endurance due to pain and SOB.  Pt will benefit with PT services with core stability, TA activation, swiss ball exercises, hamstring/hip flexor/quadriceps stretching, BLE strengthening, modalities as needed to return to PLOF. Pt prior to onset of current condition had min pain with able to complete full ADLs and work activities. Subjective: Pt stated that her pain was 0/10 currently. Pt stated that pain increases to 6/10 with standing more than 15'. Pt stated that she had increased pain doing her HEP and had difficulty getting OOB. Any changes in Ambulatory Summary Sheet?   None      Objective:   COVID screening questions were asked and patient attested that there had been no contact or symptoms    Patient had some foot spasms in her R foot during exercise that improved with weight bearing  Pt had some difficulty getting off the table       Exercises: (No more than 4 columns)   Exercise/Equipment 6/23/22 #1 6/30/22 #2 7/7/22 #3 7/14/2022 #4            WARM UP       Nu Step     L4 5' Lv4 5' L-4 x 5'           TABLE       *PPT  2x10 3\" hold x10 3\" 10x2 4\"    *SKTC  30\" x2 ea LE 30\" x2 ea side w/ towel under thigh 30\" x2 ea side w/ towel under thigh   *SLR  2x10 2x10 w/ TA hold 10x2 w/ TA   Supine hip flexor stretch  30\" x2 ea LE 30\" x2 ea side, min OP from therapist 30\" x2 L side, min OP from therapist    30\" x2 in   L sidelying by PTA    Hamstring stretch  30\" x2 ea LE 20\" x2 seated     Supine March  2x10  2x20 w/ TA hold 20x 2 w/ TA   SL Clamshells  X10 RTB 2x10 RTB in SL No TB  10x2 ea side   DKTC  2x10 GSB --                  STANDING       Pallof Press  2x10 RTB 2x10 RTB RTB 10x2   TB Row  2x10 RTB 2x10 RTB RTB 10x2   STS   x10 cues for core brace during 10x 2   21\"                                    PROPRIOCEPTION                                          MODALITIES                         Other Therapeutic Activities/Education:  Patient received education on their current pathology and how their condition effects them with their functional activities. Patient understood discussion and questions were answered. Patient understands their activity limitations and understands rational for treatment progression. Home Exercise Program:  HO issued, reviewed and discussed with patient. Pt agreed to comply. Manual Treatments: manual stretch of R hip flexor and  Stick rolling of R hip flexor and quad x10'        Modalities:  --      Communication with other providers:  eval sent 6/23/22      Assessment:  PT tolerated treatment fair today. Pt could not tolerate supine hip flexor stretch on R so it was done manually tot tolerance in L sidelying . Pt rated pain at 0/10 after treatment. Pt will continue to benefit from more strengthening and mobility. Assessment: Pt is 66year old female with gradual worsening of chronic low back pain; no previous injuries or surgeries reported. Had injections several years ago with minimal relief. Pt now has difficulties with prolonged standing/walking >5 minutes, rolling in bed, and completing a walking program increases her pain. Pt demo deficits this date that include reduced hip abductor and glute max activation, tightness of lumbar/thoracic paraspinals, decreased ability to complete sit to stand transfers, limited endurance due to pain and SOB. Pt will benefit with PT services with core stability, TA activation, swiss ball exercises, hamstring/hip flexor/quadriceps stretching, BLE strengthening, modalities as needed to return to [facility]. Pt prior to onset of current condition had min pain with able to complete full ADLs and work activities.       Plan for Next Session: --       Time In / Time Out:  5948/ 1808       Timed Code/Total Treatment Minutes:   50'/ 48' 1 man (10') 1 TE ( 25') 1 Neuro (15')       Next Progress Note due:  10th visit    Plan of Care Interventions:  [x] Therapeutic Exercise  [] Modalities:  [x] Therapeutic Activity     [] Ultrasound  [] Estim  [] Gait Training      [] Cervical Traction [] Lumbar Traction  [x] Neuromuscular Re-education    [x] Cold/hotpack [] Iontophoresis   [x] Instruction in HEP      [] Vasopneumatic   [] Dry Needling    [x] Manual Therapy               [] Aquatic Therapy              Electronically signed by:  Ann Aguirre PTA      7/14/2022, 12:56 PM     7/14/2022,6:25 PM

## 2022-07-21 ENCOUNTER — HOSPITAL ENCOUNTER (OUTPATIENT)
Dept: PHYSICAL THERAPY | Age: 72
Setting detail: THERAPIES SERIES
Discharge: HOME | End: 2022-07-21
Payer: COMMERCIAL

## 2022-07-21 PROCEDURE — 97112 NEUROMUSCULAR REEDUCATION: CPT

## 2022-07-21 PROCEDURE — 97110 THERAPEUTIC EXERCISES: CPT

## 2022-07-21 NOTE — FLOWSHEET NOTE
Outpatient Physical Therapy  [place]           [x] Phone: [phone]   Fax: [phone]  [place]           [] Phone: [phone]   Fax: [phone]        Physical Therapy Daily Treatment Note  Date:  2022    Patient Name:  Adrianne Gordon    :  1950  MRN: [id number]  Restrictions/Precautions: NONE  Diagnosis:   Low back pain, unspecified [M54.50]  Other chronic pain [G89.29] Diagnosis: LBP  Date of Injury/Surgery: --  Treatment Diagnosis:  Decreased tolerance to standing activity due to low back pain  Insurance/Certification information: [facility] 30 pcy  Referring Physician:  AGAPITO Boss   PCP: Tarik Whaley MD  Next Doctor Visit:  --  Plan of care signed (Y/N):  Y, sent 22  Outcome Measure: Oswestry: see folder  Visit# / total visits:   5/10  Pain level: 0/10   Goals:     Patient goals: improve pain and start walking for exercise  Short term goals  Time Frame for Short term goals: 5 weeks  Pt demo I w/ HEP and symptom management  Pt demo Oswestry score <40% disability to improve tolerance to ADL's  Pt demo ability to complete >9 sit to stands in 30 seconds without UE assist  Pt demo WNL hamstring and hip flexor mobility to improve tolerance to bed mobility  Pt demo 5/5 BLE strength in all directions to improve tolerance to functional transfers      Summary of Evaluation:  Assessment: Pt is 66year old female with gradual worsening of chronic low back pain; no previous injuries or surgeries reported. Had injections several years ago with minimal relief. Pt now has difficulties with prolonged standing/walking >5 minutes, rolling in bed, and completing a walking program increases her pain. Pt demo deficits this date that include reduced hip abductor and glute max activation, tightness of lumbar/thoracic paraspinals, decreased ability to complete sit to stand transfers, limited endurance due to pain and SOB.  Pt will benefit with PT services with core stability, TA activation, swiss ball exercises, hamstring/hip flexor/quadriceps stretching, BLE strengthening, modalities as needed to return to PLOF. Pt prior to onset of current condition had min pain with able to complete full ADLs and work activities. Subjective:  Pt stated that her pain was 0/10. Pt stated that she still has difficulty standing  or walking more than 5' without increased pain       Any changes in Ambulatory Summary Sheet? None      Objective:   COVID screening questions were asked and patient attested that there had been no contact or symptoms    Tightness noted in B hip flexors        Exercises: (No more than 4 columns)   Exercise/Equipment 6/30/22 #2 7/7/22 #3 7/14/2022 #4 7/22/2022 #5            WARM UP       Nu Step    L4 5' Lv4 5' L-4 x 5'  L-4 x 5'           TABLE       *PPT 2x10 3\" hold x10 3\" 10x2 4\"  10x2 5\"    *SKTC 30\" x2 ea LE 30\" x2 ea side w/ towel under thigh 30\" x2 ea side w/ towel under thigh 30\" x2 ea side w/ towel under thigh   *SLR 2x10 2x10 w/ TA hold 10x2 w/ TA 10x2 w/ TA   Supine hip flexor stretch 30\" x2 ea LE 30\" x2 ea side, min OP from therapist 30\" x2 L side, min OP from therapist    30\" x2 in   L sidelying by PTA  30\" x2 in   sidelying by PTA ea LE   Hamstring stretch 30\" x2 ea LE 20\" x2 seated      Supine March 2x10  2x20 w/ TA hold 20x 2 w/ TA 20x 2 w/ TA   SL Clamshells X10 RTB 2x10 RTB in SL No TB  10x2 ea side No TB  10x2 ea side   DKTC 2x10 GSB --                   STANDING       Pallof Press 2x10 RTB 2x10 RTB RTB 10x2 RTB 10x2   TB Row 2x10 RTB 2x10 RTB RTB 10x2 GTB 10x2   STS  x10 cues for core brace during 10x 2   21\" 10x 2   21\"                                    PROPRIOCEPTION                                          MODALITIES                         Other Therapeutic Activities/Education:  Patient received education on their current pathology and how their condition effects them with their functional activities.  Patient understood discussion and questions were answered. Patient understands their activity limitations and understands rational for treatment progression. Home Exercise Program:  HO issued, reviewed and discussed with patient. Pt agreed to comply. Manual Treatments: manual stretch of R hip flexor and  Stick rolling of R hip flexor and quad x10'        Modalities:  --      Communication with other providers:  shashial sent 6/23/22      Assessment:  PT tolerated treatment tolerated treatment fairly well. Pt is making some strength gains and ability to transition on table today. Pt rated pain at 0/10 after treatment. Pt will continue to benefit from more strengthening and mobility. Assessment: Pt is 66year old female with gradual worsening of chronic low back pain; no previous injuries or surgeries reported. Had injections several years ago with minimal relief. Pt now has difficulties with prolonged standing/walking >5 minutes, rolling in bed, and completing a walking program increases her pain. Pt demo deficits this date that include reduced hip abductor and glute max activation, tightness of lumbar/thoracic paraspinals, decreased ability to complete sit to stand transfers, limited endurance due to pain and SOB. Pt will benefit with PT services with core stability, TA activation, swiss ball exercises, hamstring/hip flexor/quadriceps stretching, BLE strengthening, modalities as needed to return to PLOF. Pt prior to onset of current condition had min pain with able to complete full ADLs and work activities.       Plan for Next Session: --       Time In / Time Out:  0073/5008      Timed Code/Total Treatment Minutes:   38'/38' 2 TE ( 23') 1 neuro (15')       Next Progress Note due:  10th visit    Plan of Care Interventions:  [x] Therapeutic Exercise  [] Modalities:  [x] Therapeutic Activity     [] Ultrasound  [] Estim  [] Gait Training      [] Cervical Traction [] Lumbar Traction  [x] Neuromuscular Re-education    [x] Cold/hotpack [] Iontophoresis   [x] Instruction in HEP      [] Vasopneumatic   [] Dry Needling    [x] Manual Therapy               [] Aquatic Therapy              Electronically signed by:  Nic Ott PTA      7/21/2022, 3:00 PM      7/21/2022,6:10 PM

## 2022-07-27 ENCOUNTER — HOSPITAL ENCOUNTER (OUTPATIENT)
Dept: PHYSICAL THERAPY | Age: 72
Setting detail: THERAPIES SERIES
Discharge: HOME | End: 2022-07-27
Payer: COMMERCIAL

## 2022-07-27 PROCEDURE — 97110 THERAPEUTIC EXERCISES: CPT

## 2022-07-27 PROCEDURE — 97112 NEUROMUSCULAR REEDUCATION: CPT

## 2022-07-27 NOTE — FLOWSHEET NOTE
Outpatient Physical Therapy  [place]           [x] Phone: [phone]   Fax: [phone]  [place]           [] Phone: [phone]   Fax: [phone]        Physical Therapy Daily Treatment Note  Date:  2022    Patient Name:  Davide Lezama    :  1950  MRN: [id number]  Restrictions/Precautions: NONE  Diagnosis:   Low back pain, unspecified [M54.50]  Other chronic pain [G89.29] Diagnosis: LBP  Date of Injury/Surgery: --  Treatment Diagnosis:  Decreased tolerance to standing activity due to low back pain  Insurance/Certification information: [employer] 30 pcy  Referring Physician:  AGAPITO Arias   PCP: Alisson Young MD  Next Doctor Visit:  --  Plan of care signed (Y/N):  Y, sent 22  Outcome Measure: Oswestry: see folder  Visit# / total visits:   6/10  Pain level: 0/10   Goals:     Patient goals: improve pain and start walking for exercise  Short term goals  Time Frame for Short term goals: 5 weeks  Pt demo I w/ HEP and symptom management  Pt demo Oswestry score <40% disability to improve tolerance to ADL's  Pt demo ability to complete >9 sit to stands in 30 seconds without UE assist  Pt demo WNL hamstring and hip flexor mobility to improve tolerance to bed mobility  Pt demo 5/5 BLE strength in all directions to improve tolerance to functional transfers      Summary of Evaluation:  Assessment: Pt is 66year old female with gradual worsening of chronic low back pain; no previous injuries or surgeries reported. Had injections several years ago with minimal relief. Pt now has difficulties with prolonged standing/walking >5 minutes, rolling in bed, and completing a walking program increases her pain. Pt demo deficits this date that include reduced hip abductor and glute max activation, tightness of lumbar/thoracic paraspinals, decreased ability to complete sit to stand transfers, limited endurance due to pain and SOB.  Pt will benefit with PT services with core stability, TA activation, swiss ball exercises, hamstring/hip flexor/quadriceps stretching, BLE strengthening, modalities as needed to return to PLOF. Pt prior to onset of current condition had min pain with able to complete full ADLs and work activities. Subjective:  Pt stated that her pain was 0/10 currently and 6-7/10 when she stands. States her back has been slightly better. She [place]. Any changes in Ambulatory Summary Sheet? None      Objective:   COVID screening questions were asked and patient attested that there had been no contact or symptoms    Unable to clear bottom from table during bridges; but no increased pain following the 1st repetition        Exercises: (No more than 4 columns)   Exercise/Equipment 7/14/2022 #4 7/22/2022 #5 7/27/22 #6           WARM UP      Nu Step    L-4 x 5'  L-4 x 5'  Lv4 5'         TABLE      *PPT 10x2 4\"  10x2 5\"  --   *SKTC 30\" x2 ea side w/ towel under thigh 30\" x2 ea side w/ towel under thigh --   *SLR 10x2 w/ TA 10x2 w/ TA --   Supine hip flexor stretch 30\" x2 L side, min OP from therapist    30\" x2 in   L sidelying by PTA  30\" x2 in   sidelying by PTA ea LE --   Hamstring stretch   30\" x2 ea side   Supine March 20x 2 w/ TA 20x 2 w/ TA --   SL Clamshells No TB  10x2 ea side No TB  10x2 ea side *seated, holding YTB 2x10   Bridge   2x10   Modified Deadbug   2x20 alt          STANDING      Pallof Press RTB 10x2 RTB 10x2 GTB 2x10   TB Row RTB 10x2 GTB 10x2 GTB 2x10   STS 10x 2   21\" 10x 2   21\" x10   Hip Extensions   2x10   Step Ups   2x10                    PROPRIOCEPTION                                    MODALITIES                      Other Therapeutic Activities/Education:  Patient received education on their current pathology and how their condition effects them with their functional activities. Patient understood discussion and questions were answered.  Patient understands their activity limitations and understands rational for treatment progression. Home Exercise Program:  HO issued, reviewed and discussed with patient. Pt agreed to comply. Manual Treatments:     Modalities:  --      Communication with other providers:  eval sent 6/23/22      Assessment:    Judy Schaefer demonstrates fair tolerance to today's session. Incorporated more standing activity, which required x1 seated rest break due to increased fatigue. She is able to recall her home program well. Making good progress towards her goals, but continues to have ongoing pain with prolonged standing. End of session: 2/10    Assessment: Pt is 66year old female with gradual worsening of chronic low back pain; no previous injuries or surgeries reported. Had injections several years ago with minimal relief. Pt now has difficulties with prolonged standing/walking >5 minutes, rolling in bed, and completing a walking program increases her pain. Pt demo deficits this date that include reduced hip abductor and glute max activation, tightness of lumbar/thoracic paraspinals, decreased ability to complete sit to stand transfers, limited endurance due to pain and SOB. Pt will benefit with PT services with core stability, TA activation, swiss ball exercises, hamstring/hip flexor/quadriceps stretching, BLE strengthening, modalities as needed to return to PLOF. Pt prior to onset of current condition had min pain with able to complete full ADLs and work activities.       Plan for Next Session: --       Time In / Time Out:  5790-3684      Timed Code/Total Treatment Minutes:    36'   (2) TE   (1) NEURO      Next Progress Note due:  10th visit    Plan of Care Interventions:  [x] Therapeutic Exercise  [] Modalities:  [x] Therapeutic Activity     [] Ultrasound  [] Estim  [] Gait Training      [] Cervical Traction [] Lumbar Traction  [x] Neuromuscular Re-education    [x] Cold/hotpack [] Iontophoresis   [x] Instruction in HEP      [] Vasopneumatic   [] Dry Needling    [x] Manual Therapy [] Aquatic Therapy              Electronically signed by:  Ethel Cross, PT, DPT, CSCS  3/76/4765,8:57 AM

## 2022-08-01 RX ORDER — MELOXICAM 7.5 MG/1
3.75 TABLET ORAL DAILY
Qty: 45 TABLET | Refills: 0 | Status: SHIPPED | OUTPATIENT
Start: 2022-08-01

## 2022-08-03 ENCOUNTER — HOSPITAL ENCOUNTER (OUTPATIENT)
Dept: PHYSICAL THERAPY | Age: 72
Setting detail: THERAPIES SERIES
Discharge: HOME | End: 2022-08-03
Payer: COMMERCIAL

## 2022-08-03 PROCEDURE — 97110 THERAPEUTIC EXERCISES: CPT

## 2022-08-03 NOTE — FLOWSHEET NOTE
Outpatient Physical Therapy  [place]           [x] Phone: [phone]   Fax: [phone]  [facility]           [] Phone: [phone]   Fax: [phone]        Physical Therapy Daily Treatment Note  Date:  8/3/2022    Patient Name:  Virgie Vazquez    :  1950  MRN: [id number]  Restrictions/Precautions: NONE  Diagnosis:   Low back pain, unspecified [M54.50]  Other chronic pain [G89.29] Diagnosis: LBP  Date of Injury/Surgery: --  Treatment Diagnosis:  Decreased tolerance to standing activity due to low back pain  Insurance/Certification information: [facility] 30 pcy  Referring Physician:  AGAPITO Noriega   PCP: Skye Rey MD  Next Doctor Visit:  --  Plan of care signed (Y/N):  Y, sent 22  Outcome Measure: Oswestry: see folder  Visit# / total visits:   7/10  Pain level: 0/10   Goals:     Patient goals: improve pain and start walking for exercise  Short term goals  Time Frame for Short term goals: 5 weeks  Pt demo I w/ HEP and symptom management MET  Pt demo Oswestry score <40% disability to improve tolerance to ADL's MET  Pt demo ability to complete >9 sit to stands in 30 seconds without UE assist MET  Pt demo WNL hamstring and hip flexor mobility to improve tolerance to bed mobility Improved, reviewed hamstring stretching  Pt demo 5/5 BLE strength in all directions to improve tolerance to functional transfers MET      Summary of Evaluation:  Assessment: Pt is 66year old female with gradual worsening of chronic low back pain; no previous injuries or surgeries reported. Had injections several years ago with minimal relief. Pt now has difficulties with prolonged standing/walking >5 minutes, rolling in bed, and completing a walking program increases her pain.  Pt demo deficits this date that include reduced hip abductor and glute max activation, tightness of lumbar/thoracic paraspinals, decreased ability to complete sit to stand transfers, limited endurance due to pain and SOB. Pt will benefit with PT services with core stability, TA activation, swiss ball exercises, hamstring/hip flexor/quadriceps stretching, BLE strengthening, modalities as needed to return to PLOF. Pt prior to onset of current condition had min pain with able to complete full ADLs and work activities. Subjective:  Tomasa Rodríguez reports no pain upon arrival. States her back feels better with activities at home, but will still get pain with prolonged standing and walking         Any changes in Ambulatory Summary Sheet? None      Objective:   COVID screening questions were asked and patient attested that there had been no contact or symptoms    SEE DN        Exercises: (No more than 4 columns)   Exercise/Equipment 7/14/2022 #4 7/22/2022 #5 7/27/22 #6 8/3/22 #7            WARM UP       Nu Step    L-4 x 5'  L-4 x 5'  Lv4 5' Lv4 6'          TABLE       *PPT 10x2 4\"  10x2 5\"  --    *SKTC 30\" x2 ea side w/ towel under thigh 30\" x2 ea side w/ towel under thigh --    *SLR 10x2 w/ TA 10x2 w/ TA --    Supine hip flexor stretch 30\" x2 L side, min OP from therapist    30\" x2 in   L sidelying by PTA  30\" x2 in   sidelying by PTA ea LE --    Hamstring stretch   30\" x2 ea side    Supine March 20x 2 w/ TA 20x 2 w/ TA --    SL Clamshells No TB  10x2 ea side No TB  10x2 ea side *seated, holding YTB 2x10    Bridge   2x10    Modified Deadbug   2x20 alt            STANDING       Pallof Press RTB 10x2 RTB 10x2 GTB 2x10    TB Row RTB 10x2 GTB 10x2 GTB 2x10    STS 10x 2   21\" 10x 2   21\" x10    Hip Extensions   2x10    Step Ups   2x10                       PROPRIOCEPTION                                          MODALITIES                         Other Therapeutic Activities/Education:  Patient received education on their current pathology and how their condition effects them with their functional activities. Patient understood discussion and questions were answered.  Patient understands their activity limitations and understands rational for treatment progression. Home Exercise Program:  HO issued, reviewed and discussed with patient. Pt agreed to comply. Manual Treatments:     Modalities:  --      Communication with other providers:  shashial sent 6/23/22      Assessment:    Judy Schaefer has completed 7 visits since the start of therapy on 6/23/22. She demonstrates overall improved tolerance to standing activities, endurance, BLE strengthening, core activation. She still continues to be limited at prolonged activity due to increased back pain, SOB, and poor endurance. Reviewed importance of beginning a walking program in combination with her home exercise program. She has improved ability to activate her core musculature with various activities. At this time, Judy Schaefer has met most of her goals and will be discharged from therapy to continue independently. End of session: 2/10    Assessment: Pt is 66year old female with gradual worsening of chronic low back pain; no previous injuries or surgeries reported. Had injections several years ago with minimal relief. Pt now has difficulties with prolonged standing/walking >5 minutes, rolling in bed, and completing a walking program increases her pain. Pt demo deficits this date that include reduced hip abductor and glute max activation, tightness of lumbar/thoracic paraspinals, decreased ability to complete sit to stand transfers, limited endurance due to pain and SOB. Pt will benefit with PT services with core stability, TA activation, swiss ball exercises, hamstring/hip flexor/quadriceps stretching, BLE strengthening, modalities as needed to return to PLOF. Pt prior to onset of current condition had min pain with able to complete full ADLs and work activities.       Plan for Next Session: --       Time In / Time Out:  5992-1621      Timed Code/Total Treatment Minutes:    27'   (2) TE      Next Progress Note due:  10th visit    Plan of Care Interventions:  [x] Therapeutic Exercise  [] Modalities:  [x] Therapeutic Activity     [] Ultrasound  [] Estim  [] Gait Training      [] Cervical Traction [] Lumbar Traction  [x] Neuromuscular Re-education    [x] Cold/hotpack [] Iontophoresis   [x] Instruction in HEP      [] Vasopneumatic   [] Dry Needling    [x] Manual Therapy               [] Aquatic Therapy              Electronically signed by:  Kathy Bee PT, DPT, CSCS  8/4/5430,0:85 AM

## 2022-08-03 NOTE — DISCHARGE SUMMARY
Outpatient Physical Therapy           [place]           [] Phone: [phone]   Fax: [phone]  [place]           [] Phone: [phone]   Fax: [phone]      To: AGAPITO Olivarez     From: Kishan Vences PT, PT     Patient: Janet Freeman                    : 1950  Diagnosis:  Low back pain, unspecified [M54.50]  Other chronic pain [G89.29]        Treatment Diagnosis:       Date: 8/3/2022  []  Progress Note                [x]  Discharge Note    Evaluation Date:   22  Total Visits to date:   7 Cancels/No-shows to date:  0    Subjective:  Vitaliy Tellez reports no pain upon arrival. States her back feels better with activities at home, but will still get pain with prolonged standing and walking      Plan of Care/Treatment to date:  [x] Therapeutic Exercise    [] Modalities:  [x] Therapeutic Activity     [] Ultrasound  [] Electrical Stimulation  [] Gait Training      [] Cervical Traction   [] Lumbar Traction  [x] Neuromuscular Re-education  [] Cold/hotpack [] Iontophoresis  [x] Instruction in HEP      Other:  [x] Manual Therapy       []  Vasopneumatic  [] Aquatic Therapy       []   Dry Needle Therapy                      Objective/Significant Findings At Last Visit/Comments:      Lumbar Spine Palpation: Noted mild hypertonicity of lumbar/thoracic paraspinals     Left AROM Right AROM       WNL    WNL        Left PROM Right PROM      General PROM LE: Right WFL,Left WFL   General PROM LE: Right WFL,Left WFL        Left Strength Right Strength      Strength LLE  L Hip Flexion: 5/5  L Hip ABduction: 5/5  L Hip ADduction: 5/5  L Hip Internal Rotation: 5/5  L Hip External Rotation: 5/5  L Knee Flexion: 5/5  L Knee Extension: 5/5 Strength RLE  R Hip Flexion: 5/5  R Hip ABduction: 5/5  R Hip ADduction: 5/5  R Hip Internal Rotation: 5/5  R Hip External Rotation: 5/5  R Knee Flexion: 5/5  R Knee Extension: 5/5          Lumbar Assessment   AROM Lumbar Spine  Lumbar spine general AROM:   Flexion: able to reach bottom of shins, increased stretch  Extension:no increased pain; pt reports, \"this feels good\"  Rotation: able to clear ASIS, no increased pain        Muscle Length/Flexibility: noted significant moderate limitations in hip flexor mobility, minimal limitations in hamstring mobility     Joint Mobility (if applicable): unable to assess due to pt being unable to assume prone positioning      30 sec STS: 13 times, without UE assist  6MWT: 1,170 ft; noted increased pain at the end and moderate SOB      Assessment:   Ray Pineda has completed 7 visits since the start of therapy on 6/23/22. She demonstrates overall improved tolerance to standing activities, endurance, BLE strengthening, core activation. She still continues to be limited at prolonged activity due to increased back pain, SOB, and poor endurance. Reviewed importance of beginning a walking program in combination with her home exercise program. She has improved ability to activate her core musculature with various activities. At this time, Ray Pineda has met most of her goals and will be discharged from therapy to continue independently.        Goal Status:  [x] Achieved [] Partially Achieved  [] Not Achieved     Patient goals: improve pain and start walking for exercise  Short term goals  Time Frame for Short term goals: 5 weeks  Pt demo I w/ HEP and symptom management MET  Pt demo Oswestry score <40% disability to improve tolerance to ADL's MET  Pt demo ability to complete >9 sit to stands in 30 seconds without UE assist MET  Pt demo WNL hamstring and hip flexor mobility to improve tolerance to bed mobility Improved, reviewed hamstring stretching  Pt demo 5/5 BLE strength in all directions to improve tolerance to functional transfers MET      Frequency/Duration:  # Days per week: [] 1 day # Weeks: [] 1 week [] 4 weeks [] 8 weeks     [x] 2 days   [] 2 weeks [x] 5 weeks [] 10 weeks     [] 3 days   [] 3 weeks [] 6 weeks [] 12 weeks       Rehab Potential: [] Excellent [x] Good [] Fair  [] Poor       Patient Status: [] Continue per initial plan of Care     [x] Patient now discharged     [] Additional visits requested, Please re-certify for additional visits: If we are requesting more visits, we fully anticipate the patient's condition is expected to improve within the treatment timeframe we are requesting. Electronically signed by:  Carlos White PT, LB, [facility] 8/3/2022, 8:49 AM    If you have any questions or concerns, please don't hesitate to call.   Thank you for your referral.    Physician Signature:______________________ Date:______ Time: ________  By signing above, therapists plan is approved by physician

## 2022-08-30 RX ORDER — FOLIC ACID 1 MG/1
1 TABLET ORAL DAILY
Qty: 90 TABLET | Refills: 1 | Status: SHIPPED | OUTPATIENT
Start: 2022-08-30

## 2022-08-30 RX ORDER — PRAVASTATIN SODIUM 10 MG
10 TABLET ORAL DAILY
Qty: 90 TABLET | Refills: 1 | Status: SHIPPED | OUTPATIENT
Start: 2022-08-30 | End: 2022-11-28

## 2022-08-30 RX ORDER — LOSARTAN POTASSIUM AND HYDROCHLOROTHIAZIDE 25; 100 MG/1; MG/1
1 TABLET ORAL DAILY
Qty: 90 TABLET | Refills: 1 | Status: SHIPPED | OUTPATIENT
Start: 2022-08-30

## 2022-09-02 ENCOUNTER — HOSPITAL ENCOUNTER (OUTPATIENT)
Dept: WOMENS IMAGING | Age: 72
Discharge: HOME | End: 2022-09-02
Payer: COMMERCIAL

## 2022-09-02 DIAGNOSIS — Z12.31 ENCOUNTER FOR SCREENING MAMMOGRAM FOR MALIGNANT NEOPLASM OF BREAST: ICD-10-CM

## 2022-09-02 PROCEDURE — 77067 SCR MAMMO BI INCL CAD: CPT

## 2022-09-07 LAB
CHOLESTEROL: 192 MG/DL
GLUCOSE BLD-MCNC: 90 MG/DL (ref 70–99)
HDLC SERPL-MCNC: 58 MG/DL
LDL CHOLESTEROL CALCULATED: 107 MG/DL
PATIENT FASTING?: YES
TRIGL SERPL-MCNC: 134 MG/DL

## 2022-11-09 ENCOUNTER — COMMUNITY OUTREACH (OUTPATIENT)
Dept: FAMILY MEDICINE CLINIC | Age: 72
End: 2022-11-09

## 2022-11-21 ENCOUNTER — TELEPHONE (OUTPATIENT)
Dept: FAMILY MEDICINE CLINIC | Age: 72
End: 2022-11-21

## 2022-11-21 RX ORDER — MELOXICAM 7.5 MG/1
TABLET ORAL
Qty: 45 TABLET | Refills: 0 | OUTPATIENT
Start: 2022-11-21

## 2022-11-21 RX ORDER — MELOXICAM 7.5 MG/1
3.75 TABLET ORAL DAILY
Qty: 45 TABLET | Refills: 0 | Status: SHIPPED | OUTPATIENT
Start: 2022-11-21

## 2023-02-02 RX ORDER — MELOXICAM 7.5 MG/1
3.75 TABLET ORAL DAILY
Qty: 45 TABLET | Refills: 0 | Status: SHIPPED | OUTPATIENT
Start: 2023-02-02

## 2023-02-08 ENCOUNTER — OFFICE VISIT (OUTPATIENT)
Dept: FAMILY MEDICINE CLINIC | Age: 73
End: 2023-02-08
Payer: COMMERCIAL

## 2023-02-08 VITALS
DIASTOLIC BLOOD PRESSURE: 66 MMHG | WEIGHT: 256 LBS | SYSTOLIC BLOOD PRESSURE: 126 MMHG | HEIGHT: 65 IN | BODY MASS INDEX: 42.65 KG/M2 | HEART RATE: 70 BPM | OXYGEN SATURATION: 99 %

## 2023-02-08 DIAGNOSIS — J02.9 ACUTE PHARYNGITIS, UNSPECIFIED ETIOLOGY: Primary | ICD-10-CM

## 2023-02-08 DIAGNOSIS — M17.11 PRIMARY OSTEOARTHRITIS OF RIGHT KNEE: ICD-10-CM

## 2023-02-08 DIAGNOSIS — K21.9 GASTROESOPHAGEAL REFLUX DISEASE, UNSPECIFIED WHETHER ESOPHAGITIS PRESENT: ICD-10-CM

## 2023-02-08 DIAGNOSIS — E78.5 HYPERLIPIDEMIA, UNSPECIFIED HYPERLIPIDEMIA TYPE: ICD-10-CM

## 2023-02-08 DIAGNOSIS — Z12.11 COLON CANCER SCREENING: ICD-10-CM

## 2023-02-08 DIAGNOSIS — I10 HYPERTENSION, ESSENTIAL: ICD-10-CM

## 2023-02-08 DIAGNOSIS — I27.82 CHRONIC PULMONARY EMBOLISM, UNSPECIFIED PULMONARY EMBOLISM TYPE, UNSPECIFIED WHETHER ACUTE COR PULMONALE PRESENT (HCC): ICD-10-CM

## 2023-02-08 PROCEDURE — 99213 OFFICE O/P EST LOW 20 MIN: CPT | Performed by: FAMILY MEDICINE

## 2023-02-08 PROCEDURE — 1123F ACP DISCUSS/DSCN MKR DOCD: CPT | Performed by: FAMILY MEDICINE

## 2023-02-08 PROCEDURE — 3074F SYST BP LT 130 MM HG: CPT | Performed by: FAMILY MEDICINE

## 2023-02-08 PROCEDURE — 3078F DIAST BP <80 MM HG: CPT | Performed by: FAMILY MEDICINE

## 2023-02-08 RX ORDER — PREDNISONE 10 MG/1
10 TABLET ORAL 2 TIMES DAILY
Qty: 10 TABLET | Refills: 0 | Status: SHIPPED | OUTPATIENT
Start: 2023-02-08 | End: 2023-02-08

## 2023-02-08 RX ORDER — AMOXICILLIN AND CLAVULANATE POTASSIUM 875; 125 MG/1; MG/1
1 TABLET, FILM COATED ORAL 2 TIMES DAILY
Qty: 14 TABLET | Refills: 0 | Status: SHIPPED | OUTPATIENT
Start: 2023-02-08 | End: 2023-02-08

## 2023-02-08 RX ORDER — AMOXICILLIN AND CLAVULANATE POTASSIUM 875; 125 MG/1; MG/1
1 TABLET, FILM COATED ORAL 2 TIMES DAILY
Qty: 14 TABLET | Refills: 0 | Status: SHIPPED | OUTPATIENT
Start: 2023-02-08 | End: 2023-02-15

## 2023-02-08 RX ORDER — PREDNISONE 10 MG/1
10 TABLET ORAL 2 TIMES DAILY
Qty: 10 TABLET | Refills: 0 | Status: SHIPPED | OUTPATIENT
Start: 2023-02-08 | End: 2023-02-13

## 2023-02-08 ASSESSMENT — ENCOUNTER SYMPTOMS
EYE PAIN: 0
CHEST TIGHTNESS: 0
VOMITING: 0
WHEEZING: 0
DIARRHEA: 0
SORE THROAT: 1
TROUBLE SWALLOWING: 0
SHORTNESS OF BREATH: 0
NAUSEA: 0
ABDOMINAL PAIN: 0
BLOOD IN STOOL: 0
COUGH: 1

## 2023-02-08 ASSESSMENT — PATIENT HEALTH QUESTIONNAIRE - PHQ9
SUM OF ALL RESPONSES TO PHQ QUESTIONS 1-9: 0
SUM OF ALL RESPONSES TO PHQ QUESTIONS 1-9: 0
2. FEELING DOWN, DEPRESSED OR HOPELESS: 0
SUM OF ALL RESPONSES TO PHQ QUESTIONS 1-9: 0
1. LITTLE INTEREST OR PLEASURE IN DOING THINGS: 0
SUM OF ALL RESPONSES TO PHQ9 QUESTIONS 1 & 2: 0
SUM OF ALL RESPONSES TO PHQ QUESTIONS 1-9: 0

## 2023-02-08 NOTE — PROGRESS NOTES
2/8/2023    Annemarie Opitz    Chief Complaint   Patient presents with    Pharyngitis     X1 week, productive cough, some blood. Covid test negative. HPI  History was obtained from the patient. Catherine Snider is a 67 y.o. female who presents today with history of cough that is, minimal mainly sore throat and notes a little bit of purulent sputum with a blood streak in it today. Patient is checked for home COVID test couple times they have been negative. Has not had a high fever having trouble with some blockage in right nostril. She denies chest pain or true dyspnea. Patient has a past history of arthralgia, hypertension, back pain from spinal stenosis, hyperlipidemia, recurrent PE on NOAC therapy and GERD. Walter King REVIEW OF SYMPTOMS    Review of Systems   Constitutional:  Negative for activity change and fatigue. HENT:  Positive for congestion and sore throat. Negative for hearing loss, mouth sores and trouble swallowing. Eyes:  Negative for pain and visual disturbance. Respiratory:  Positive for cough. Negative for chest tightness, shortness of breath and wheezing. Cardiovascular:  Negative for chest pain and palpitations. Gastrointestinal:  Negative for abdominal pain, blood in stool, diarrhea, nausea and vomiting. Endocrine: Negative for polydipsia and polyuria. Genitourinary:  Negative for dysuria, frequency and urgency. Musculoskeletal:  Negative for arthralgias, gait problem and neck stiffness. Skin:  Negative for rash. Allergic/Immunologic: Negative for environmental allergies. Neurological:  Negative for dizziness, seizures, speech difficulty and weakness. Hematological:  Does not bruise/bleed easily. Psychiatric/Behavioral:  Negative for agitation, confusion, hallucinations, self-injury and suicidal ideas. The patient is not nervous/anxious.       PAST MEDICAL HISTORY  Past Medical History:   Diagnosis Date    Arthritis     generalized OA    DVT of lower extremity (deep venous thrombosis) ([facility] [address]     Left 2012    Fracture of left ankle 02/01/2004    GERD (gastroesophageal reflux disease)     H/O Doppler ultrasound 11/02/2014    Venous Doppler. Left lower extremity probably old DVT in the distal SFA and popliteal areas otherwise right lower extremity reveals normal findings. H/O Doppler ultrasound 09/19/2017    LLE - chronic DVT, severe veous reflux    H/O echocardiogram 09/26/2016    EF60% mild MR, TR, mild pulm htn    H/O echocardiogram 07/18/2018    EF55-60% mild TR    Homocystinemia (HCC)     Hx of blood clots     \"hx of PE- 2013 and had DVT left leg that went to my lung\"    Hx of Doppler echocardiogram 12/22/2017    EF 50-60% mild biatrial enlargement, mild to mod TR, mildly elevated pulmonary artery pressure. Hyperlipidemia     Hypertension, essential     Left leg swelling     wears compression stocking    Left Lower Extremity Venous Doppler ultrasound 09/16/2021    Evidence of partially occlusive chronic DVT in the left proximal and mid SFV.     Non-alcoholic fatty liver disease     Obesity     Osteoarthritis     Other chest pain 12/17/2018    Pulmonary embolism ([facility] [address] 2012    Bilateral    Spinal stenosis     lumbar spine--scheduled for epidural injection 3/19/2018    Varicose veins of lower extremity     Wears glasses        FAMILY HISTORY  Family History   Problem Relation Age of Onset    COPD Mother     Cancer Father         kidney    Heart Attack Father     Heart Disease Father     No Known Problems Sister     No Known Problems Sister     Breast Cancer Neg Hx     Ovarian Cancer Neg Hx        SOCIAL HISTORY  Social History     Socioeconomic History    Marital status:      Spouse name: None    Number of children: 2    Years of education: None    Highest education level: None   Tobacco Use    Smoking status: Never    Smokeless tobacco: Never   Vaping Use    Vaping Use: Never used   Substance and Sexual Activity    Alcohol use: Yes     Comment: average \"one time per month\" Drug use: No    Sexual activity: Never        SURGICAL HISTORY  Past Surgical History:   Procedure Laterality Date    ANKLE SURGERY Left 02/2004    \"have plate and pins still\"    COLONOSCOPY  2010? DILATION AND CURETTAGE OF UTERUS  May 2012    TONSILLECTOMY      age 10    TOTAL KNEE ARTHROPLASTY Left 4/23/2019    KNEE TOTAL ARTHROPLASTY LEFT performed by Nirmal Márquez DO at [address]  Current Outpatient Medications   Medication Sig Dispense Refill    amoxicillin-clavulanate (AUGMENTIN) 875-125 MG per tablet Take 1 tablet by mouth 2 times daily for 7 days 14 tablet 0    predniSONE (DELTASONE) 10 MG tablet Take 1 tablet by mouth 2 times daily for 5 days 10 tablet 0    meloxicam (MOBIC) 7.5 MG tablet Take 0.5 tablets by mouth daily 45 tablet 0    losartan-hydroCHLOROthiazide (HYZAAR) 100-25 MG per tablet Take 1 tablet by mouth daily 90 tablet 1    folic acid (FOLVITE) 1 MG tablet Take 1 tablet by mouth daily 90 tablet 1    pravastatin (PRAVACHOL) 10 MG tablet Take 1 tablet by mouth daily 90 tablet 1    rivaroxaban (XARELTO) 20 MG TABS tablet TAKE 1 TABLET BY MOUTH ONE TIME A DAY 90 tablet 1    metoprolol tartrate (LOPRESSOR) 25 MG tablet Take 1 tablet by mouth 2 times daily 60 tablet 5    albuterol sulfate HFA (VENTOLIN HFA) 108 (90 Base) MCG/ACT inhaler Inhale 2 puffs into the lungs 4 times daily as needed for Wheezing 54 g 1    tiZANidine (ZANAFLEX) 4 MG tablet Take 1 tablet by mouth 3 times daily as needed (back pain) 30 tablet 0    aspirin 81 MG EC tablet Take 1 tablet by mouth daily 384 tablet 0    magnesium gluconate (MAGONATE) 500 MG tablet Take 500 mg by mouth Daily      Elastic Bandages & Supports (MEDICAL COMPRESSION THIGH HIGH) MISC 1 Package by Does not apply route daily Wear daily and remove nightly   20 - 30 mmgh 1 each 0     No current facility-administered medications for this visit.        ALLERGIES  Allergies   Allergen Reactions Ace Inhibitors        PHYSICAL EXAM    /66 (Site: Left Upper Arm, Position: Sitting, Cuff Size: Large Adult)   Pulse 70   Ht 5' 4.5\" (1.638 m)   Wt 256 lb (116.1 kg)   LMP 01/16/2010   SpO2 99%   BMI 43.26 kg/m²     Physical Exam  Vitals and nursing note reviewed. Constitutional:       General: She is not in acute distress. Appearance: Normal appearance. She is well-developed. She is not ill-appearing or toxic-appearing. HENT:      Head: Normocephalic and atraumatic. Nose: Congestion present. Mouth/Throat:      Mouth: Mucous membranes are moist.      Pharynx: Posterior oropharyngeal erythema present. Eyes:      Pupils: Pupils are equal, round, and reactive to light. Cardiovascular:      Rate and Rhythm: Normal rate and regular rhythm. Heart sounds: Normal heart sounds. No murmur heard. No gallop. Pulmonary:      Effort: Pulmonary effort is normal. No respiratory distress. Breath sounds: Normal breath sounds. No wheezing or rales. Abdominal:      Palpations: Abdomen is soft. Musculoskeletal:         General: No swelling or deformity. Normal range of motion. Cervical back: Normal range of motion and neck supple. Skin:     General: Skin is warm and dry. Coloration: Skin is not jaundiced. Neurological:      General: No focal deficit present. Mental Status: She is alert and oriented to person, place, and time. Mental status is at baseline. Cranial Nerves: No cranial nerve deficit. Motor: No weakness. Gait: Gait normal.   Psychiatric:         Mood and Affect: Mood normal.         Behavior: Behavior normal.         Thought Content: Thought content normal.       ASSESSMENT & PLAN     Diagnosis Orders   1. Acute pharyngitis, unspecified etiology        2. Primary osteoarthritis of right knee        3. Hypertension, essential  CBC    Comprehensive Metabolic Panel      4. Hyperlipidemia, unspecified hyperlipidemia type  LIPID PANEL      5. Chronic pulmonary embolism, unspecified pulmonary embolism type, unspecified whether acute cor pulmonale present ([facility] [address]        6. Gastroesophageal reflux disease, unspecified whether esophagitis present        7. Colon cancer screening  Fecal DNA Colorectal cancer screening (Cologuard)      We will check lipid panel, CMP and CBC and have her follow-up for results. She is to push fluids use Tylenol for discomfort gargle as needed treat pharyngitis acutely with prednisone 10 mg twice daily for 5 days and Augmentin 875 1 p.o. twice daily with food for 1 week. If starts bring up more sputum that appears to be grossly bloody she will need to go to the emergency room. At this point I suspect tinge of blood in sputum is from pharyngitis and congestion in a patient on anticoagulation. We will also send in order for Cologuard for her. Return in about 6 months (around 8/8/2023).          Electronically signed by Leyda Ray MD on 2/8/2023

## 2023-02-15 RX ORDER — LOSARTAN POTASSIUM AND HYDROCHLOROTHIAZIDE 25; 100 MG/1; MG/1
1 TABLET ORAL DAILY
Qty: 90 TABLET | Refills: 1 | Status: SHIPPED | OUTPATIENT
Start: 2023-02-15

## 2023-02-15 RX ORDER — FOLIC ACID 1 MG/1
1 TABLET ORAL DAILY
Qty: 90 TABLET | Refills: 1 | Status: SHIPPED | OUTPATIENT
Start: 2023-02-15

## 2023-03-09 ENCOUNTER — TELEPHONE (OUTPATIENT)
Dept: CARDIOLOGY CLINIC | Age: 73
End: 2023-03-09

## 2023-03-24 RX ORDER — PRAVASTATIN SODIUM 10 MG
10 TABLET ORAL DAILY
Qty: 90 TABLET | Refills: 1 | Status: SHIPPED | OUTPATIENT
Start: 2023-03-24 | End: 2023-06-22

## 2023-04-04 ENCOUNTER — TELEPHONE (OUTPATIENT)
Dept: CARDIOLOGY CLINIC | Age: 73
End: 2023-04-04

## 2023-04-04 NOTE — TELEPHONE ENCOUNTER
Patient requested to see when her paymnt on her account was that she paid $5.38. I let her know, I didn't find that amount paid.

## 2023-04-07 DIAGNOSIS — E78.5 HYPERLIPIDEMIA, UNSPECIFIED HYPERLIPIDEMIA TYPE: ICD-10-CM

## 2023-04-07 DIAGNOSIS — I10 HYPERTENSION, ESSENTIAL: ICD-10-CM

## 2023-04-07 LAB
ALBUMIN SERPL-MCNC: 4.1 G/DL (ref 3.4–5)
ALBUMIN/GLOB SERPL: 1.6 {RATIO} (ref 1.1–2.2)
ALP SERPL-CCNC: 94 U/L (ref 40–129)
ALT SERPL-CCNC: 34 U/L (ref 10–40)
ANION GAP SERPL CALCULATED.3IONS-SCNC: 14 MMOL/L (ref 3–16)
AST SERPL-CCNC: 28 U/L (ref 15–37)
BILIRUB SERPL-MCNC: 0.4 MG/DL (ref 0–1)
BUN SERPL-MCNC: 23 MG/DL (ref 7–20)
CALCIUM SERPL-MCNC: 9.6 MG/DL (ref 8.3–10.6)
CHLORIDE SERPL-SCNC: 100 MMOL/L (ref 99–110)
CHOLEST SERPL-MCNC: 195 MG/DL (ref 0–199)
CO2 SERPL-SCNC: 24 MMOL/L (ref 21–32)
CREAT SERPL-MCNC: 1 MG/DL (ref 0.6–1.2)
DEPRECATED RDW RBC AUTO: 14.4 % (ref 12.4–15.4)
GFR SERPLBLD CREATININE-BSD FMLA CKD-EPI: 59 ML/MIN/{1.73_M2}
GLUCOSE SERPL-MCNC: 102 MG/DL (ref 70–99)
HCT VFR BLD AUTO: 44.1 % (ref 36–48)
HDLC SERPL-MCNC: 54 MG/DL (ref 40–60)
HGB BLD-MCNC: 14.6 G/DL (ref 12–16)
LDLC SERPL CALC-MCNC: 114 MG/DL
MCH RBC QN AUTO: 28.9 PG (ref 26–34)
MCHC RBC AUTO-ENTMCNC: 33.1 G/DL (ref 31–36)
MCV RBC AUTO: 87.4 FL (ref 80–100)
PLATELET # BLD AUTO: 253 K/UL (ref 135–450)
PMV BLD AUTO: 10 FL (ref 5–10.5)
POTASSIUM SERPL-SCNC: 3.9 MMOL/L (ref 3.5–5.1)
PROT SERPL-MCNC: 6.6 G/DL (ref 6.4–8.2)
RBC # BLD AUTO: 5.04 M/UL (ref 4–5.2)
SODIUM SERPL-SCNC: 138 MMOL/L (ref 136–145)
TRIGL SERPL-MCNC: 135 MG/DL (ref 0–150)
VLDLC SERPL CALC-MCNC: 27 MG/DL
WBC # BLD AUTO: 7.3 K/UL (ref 4–11)

## 2023-05-08 RX ORDER — MELOXICAM 7.5 MG/1
3.75 TABLET ORAL DAILY
Qty: 45 TABLET | Refills: 0 | Status: SHIPPED | OUTPATIENT
Start: 2023-05-08

## 2023-06-01 DIAGNOSIS — M79.673 PAIN OF FOOT, UNSPECIFIED LATERALITY: Primary | ICD-10-CM

## 2023-06-01 DIAGNOSIS — M79.673 PAIN OF FOOT, UNSPECIFIED LATERALITY: ICD-10-CM

## 2023-06-01 RX ORDER — METHYLPREDNISOLONE 4 MG/1
TABLET ORAL
Qty: 1 KIT | Refills: 0 | Status: SHIPPED | OUTPATIENT
Start: 2023-06-01 | End: 2023-06-07

## 2023-06-02 LAB — URATE SERPL-MCNC: 7.1 MG/DL (ref 2.6–6)

## 2023-06-22 ENCOUNTER — OFFICE VISIT (OUTPATIENT)
Dept: ORTHOPEDIC SURGERY | Age: 73
End: 2023-06-22

## 2023-06-22 VITALS
SYSTOLIC BLOOD PRESSURE: 122 MMHG | HEART RATE: 65 BPM | DIASTOLIC BLOOD PRESSURE: 72 MMHG | HEIGHT: 65 IN | BODY MASS INDEX: 41.75 KG/M2 | WEIGHT: 250.6 LBS | RESPIRATION RATE: 14 BRPM | OXYGEN SATURATION: 97 %

## 2023-06-22 DIAGNOSIS — E66.01 OBESITY, CLASS III, BMI 40-49.9 (MORBID OBESITY) (HCC): ICD-10-CM

## 2023-06-22 DIAGNOSIS — M70.61 TROCHANTERIC BURSITIS OF RIGHT HIP: Primary | ICD-10-CM

## 2023-06-22 ASSESSMENT — ENCOUNTER SYMPTOMS
RESPIRATORY NEGATIVE: 1
GASTROINTESTINAL NEGATIVE: 1
EYES NEGATIVE: 1

## 2023-06-22 NOTE — PATIENT INSTRUCTIONS
Injection given in right trochanteric bursa today. If you continue to have significant pain with no relief from the injection then call the office but make sure you give it at least 2 weeks before calling and if at that point we could consider intra-articular injection in the hip but that would have to be done at the hospital under x-ray. Follow-up as needed    We are committed to providing you the best care possible. If you receive a survey after visiting one of our offices, please take time to share your experience concerning your physician office visit. These surveys are confidential and no health information about you is shared. We are eager to improve for you and we are counting on your feedback to help make that happen.

## 2023-06-22 NOTE — PROGRESS NOTES
Lori Mustafa is a 67 y.o. female that is in the office today with right hip. Hurts more into the right hip joint instead of the bursa. Patient states that about 10 days ago she started to have pains within the hip. Uncomfortable to sleep and she was taking narcotic pain medication that was prescribed to her in 2019 for some pain relief. Patient claims that it did her some relief. Patient did receive a steroid injection in 2019 by SHANNAN Doe
lower extremities. right hip exam:  Sensation is intact to the right lower extremity to light touch. Muscular strength is clinically appropriate bilaterally. Mild pain radiating into the groin with internal rotation of the hip. Mild pain to palpation over the greater trochanter and IT band. 5/5 hip abduction, 5/5 hip adduction. Outsiderecord review: none    Imaging studies:  taken and reviewed  1 views of the right hip, pelvis show moderate to severe degenerative changes within the right hip joint with no fractures or dislocations. The official read and interpretation of these x-rays will be done by the the [facility]       Impression:  right hip trochanteric bursitis      Plan:    Patient Instructions   Injection given in right trochanteric bursa today. If you continue to have significant pain with no relief from the injection then call the office but make sure you give it at least 2 weeks before calling and if at that point we could consider intra-articular injection in the hip but that would have to be done at the hospital under x-ray. Follow-up as needed    We are committed to providing you the best care possible. If you receive a survey after visiting one of our offices, please take time to share your experience concerning your physician office visit. These surveys are confidential and no health information about you is shared. We are eager to improve for you and we are counting on your feedback to help make that happen. right hip greater trochanteric bursa injection procedure note    Pre-procedure diagnosis:  right hip greater trochanteric bursitis    Post-procedure diagnosis:  same    Procedure: The planned procedure/risks/benefits/alternatives were discussed with the patient. Risks include, but are not limited to, increased pain, drug reaction, infection, bleeding, lack of improvement, neurovascular injury, and increased blood sugar levels.   The patient understood and

## 2023-07-12 ENCOUNTER — OFFICE VISIT (OUTPATIENT)
Dept: OBGYN | Age: 73
End: 2023-07-12

## 2023-07-12 ENCOUNTER — TELEPHONE (OUTPATIENT)
Dept: OBGYN | Age: 73
End: 2023-07-12

## 2023-07-12 VITALS
BODY MASS INDEX: 41.48 KG/M2 | HEIGHT: 64 IN | WEIGHT: 243 LBS | SYSTOLIC BLOOD PRESSURE: 152 MMHG | DIASTOLIC BLOOD PRESSURE: 81 MMHG

## 2023-07-12 DIAGNOSIS — Z78.0 ENCOUNTER FOR OSTEOPOROSIS SCREENING IN ASYMPTOMATIC POSTMENOPAUSAL PATIENT: ICD-10-CM

## 2023-07-12 DIAGNOSIS — Z01.419 ENCOUNTER FOR WELL WOMAN EXAM WITH ROUTINE GYNECOLOGICAL EXAM: ICD-10-CM

## 2023-07-12 DIAGNOSIS — Z13.820 ENCOUNTER FOR OSTEOPOROSIS SCREENING IN ASYMPTOMATIC POSTMENOPAUSAL PATIENT: ICD-10-CM

## 2023-07-12 DIAGNOSIS — N95.0 PMB (POSTMENOPAUSAL BLEEDING): Primary | ICD-10-CM

## 2023-07-12 NOTE — PROGRESS NOTES
7/12/23    Serena Primrose  1950    Chief Complaint   Patient presents with    New Patient     Annual exam, postmenopausal, no HRT, not sexually active, pap 2019 negative no hx of abnormal pap's, mammo 2023 negative, dexa never, colonoscopy 2013 negative, colguard 2023 negative. Complains of postmenopausal bleeding      Other     Bleeding started 7/9/23 in the evening, no provocation noted to cause PMB, still currently bleeding, describes bleeding as moderate bright red constant flow, denies any pelvic pain, dysuria, frequency. Normal BM  Within past 4 yrs had same problem but bleeding only lasted short period of time . Serena Primrose is a 67 y.o. female who presents today for evaluation of annual examination and postmenopausal bleeding. Patient    Past Medical History:   Diagnosis Date    Arthritis     generalized OA    DVT of lower extremity (deep venous thrombosis) ([address])     Left 2012    Fracture of left ankle 02/01/2004    GERD (gastroesophageal reflux disease)     H/O Doppler ultrasound 11/02/2014    Venous Doppler. Left lower extremity probably old DVT in the distal SFA and popliteal areas otherwise right lower extremity reveals normal findings. H/O Doppler ultrasound 09/19/2017    LLE - chronic DVT, severe veous reflux    H/O echocardiogram 09/26/2016    EF60% mild MR, TR, mild pulm htn    H/O echocardiogram 07/18/2018    EF55-60% mild TR    Homocystinemia (HCC)     Hx of blood clots     \"hx of PE- 2013 and had DVT left leg that went to my lung\"    Hx of Doppler echocardiogram 12/22/2017    EF 50-60% mild biatrial enlargement, mild to mod TR, mildly elevated pulmonary artery pressure. Hyperlipidemia     Hypertension, essential     Left leg swelling     wears compression stocking    Left Lower Extremity Venous Doppler ultrasound 09/16/2021    Evidence of partially occlusive chronic DVT in the left proximal and mid SFV.     Non-alcoholic fatty liver disease     Obesity     Osteoarthritis     Other

## 2023-07-12 NOTE — TELEPHONE ENCOUNTER
Patient informed and scheduled for in office hysteroscopy [facility]  7/24/23 8:45am documents scanned

## 2023-07-12 NOTE — TELEPHONE ENCOUNTER
Received request from Dr. Tami Goldberg for hysteroscopy dilation and curettage [id number] for in office procedure.   Per Ese at [address] [facility] no prior auth required for in office procedure   Ref # for phone call [id number]  Ready to schedule   Referral created in Epic

## 2023-07-14 ENCOUNTER — TELEPHONE (OUTPATIENT)
Dept: OBGYN | Age: 73
End: 2023-07-14

## 2023-07-17 ENCOUNTER — TELEPHONE (OUTPATIENT)
Dept: OBGYN | Age: 73
End: 2023-07-17

## 2023-07-17 RX ORDER — MISOPROSTOL 100 UG/1
200 TABLET ORAL 4 TIMES DAILY
Qty: 8 TABLET | Refills: 0 | Status: SHIPPED | OUTPATIENT
Start: 2023-07-17

## 2023-07-24 ENCOUNTER — HOSPITAL ENCOUNTER (OUTPATIENT)
Age: 73
Setting detail: SPECIMEN
Discharge: HOME | End: 2023-07-24
Payer: COMMERCIAL

## 2023-07-24 ENCOUNTER — PROCEDURE VISIT (OUTPATIENT)
Dept: OBGYN | Age: 73
End: 2023-07-24
Payer: COMMERCIAL

## 2023-07-24 VITALS
HEIGHT: 64 IN | SYSTOLIC BLOOD PRESSURE: 131 MMHG | DIASTOLIC BLOOD PRESSURE: 79 MMHG | BODY MASS INDEX: 40.97 KG/M2 | WEIGHT: 240 LBS

## 2023-07-24 DIAGNOSIS — N95.0 PMB (POSTMENOPAUSAL BLEEDING): Primary | ICD-10-CM

## 2023-07-24 PROCEDURE — 88305 TISSUE EXAM BY PATHOLOGIST: CPT | Performed by: PATHOLOGY

## 2023-07-24 PROCEDURE — 58558 HYSTEROSCOPY BIOPSY: CPT | Performed by: OBSTETRICS & GYNECOLOGY

## 2023-07-24 PROCEDURE — 96372 THER/PROPH/DIAG INJ SC/IM: CPT | Performed by: OBSTETRICS & GYNECOLOGY

## 2023-07-24 RX ORDER — KETOROLAC TROMETHAMINE 30 MG/ML
30 INJECTION, SOLUTION INTRAMUSCULAR; INTRAVENOUS ONCE
Status: COMPLETED | OUTPATIENT
Start: 2023-07-24 | End: 2023-07-24

## 2023-07-24 RX ADMIN — KETOROLAC TROMETHAMINE 30 MG: 30 INJECTION, SOLUTION INTRAMUSCULAR; INTRAVENOUS at 08:48

## 2023-07-24 SDOH — ECONOMIC STABILITY: FOOD INSECURITY: WITHIN THE PAST 12 MONTHS, THE FOOD YOU BOUGHT JUST DIDN'T LAST AND YOU DIDN'T HAVE MONEY TO GET MORE.: NEVER TRUE

## 2023-07-24 SDOH — ECONOMIC STABILITY: INCOME INSECURITY: HOW HARD IS IT FOR YOU TO PAY FOR THE VERY BASICS LIKE FOOD, HOUSING, MEDICAL CARE, AND HEATING?: NOT HARD AT ALL

## 2023-07-24 SDOH — ECONOMIC STABILITY: FOOD INSECURITY: WITHIN THE PAST 12 MONTHS, YOU WORRIED THAT YOUR FOOD WOULD RUN OUT BEFORE YOU GOT MONEY TO BUY MORE.: NEVER TRUE

## 2023-07-24 SDOH — ECONOMIC STABILITY: HOUSING INSECURITY
IN THE LAST 12 MONTHS, WAS THERE A TIME WHEN YOU DID NOT HAVE A STEADY PLACE TO SLEEP OR SLEPT IN A SHELTER (INCLUDING NOW)?: NO

## 2023-07-25 RX ORDER — PREDNISONE 10 MG/1
10 TABLET ORAL
Qty: 15 TABLET | Refills: 0 | Status: SHIPPED | OUTPATIENT
Start: 2023-07-25 | End: 2023-07-30

## 2023-07-25 RX ORDER — GABAPENTIN 300 MG/1
300 CAPSULE ORAL 2 TIMES DAILY
Qty: 28 CAPSULE | Refills: 1 | Status: SHIPPED | OUTPATIENT
Start: 2023-07-25 | End: 2023-08-22

## 2023-07-31 NOTE — PROGRESS NOTES
Hysteroscopy D&C for postmenopausal bleeding    Patient taken procedure room where pelvic examination performed. Cervix was injected with 1% lidocaine with epinephrine. Cervix dilated with plastic dilator. History of is advanced to the fundus. No endometrial lesions to be seen. Endometrial sampling was obtained with a plastic curette. Patient tolerated procedure well. Patient follow-up in 2 weeks for pathology discussion.

## 2023-08-07 ENCOUNTER — OFFICE VISIT (OUTPATIENT)
Dept: OBGYN | Age: 73
End: 2023-08-07
Payer: COMMERCIAL

## 2023-08-07 VITALS
SYSTOLIC BLOOD PRESSURE: 141 MMHG | BODY MASS INDEX: 40.97 KG/M2 | HEIGHT: 64 IN | DIASTOLIC BLOOD PRESSURE: 64 MMHG | WEIGHT: 240 LBS

## 2023-08-07 DIAGNOSIS — N85.00 ENDOMETRIAL HYPERPLASIA WITHOUT ATYPIA: Primary | ICD-10-CM

## 2023-08-07 PROCEDURE — 99214 OFFICE O/P EST MOD 30 MIN: CPT | Performed by: OBSTETRICS & GYNECOLOGY

## 2023-08-07 PROCEDURE — 3078F DIAST BP <80 MM HG: CPT | Performed by: OBSTETRICS & GYNECOLOGY

## 2023-08-07 PROCEDURE — 1123F ACP DISCUSS/DSCN MKR DOCD: CPT | Performed by: OBSTETRICS & GYNECOLOGY

## 2023-08-07 PROCEDURE — 3074F SYST BP LT 130 MM HG: CPT | Performed by: OBSTETRICS & GYNECOLOGY

## 2023-08-07 RX ORDER — MELOXICAM 7.5 MG/1
3.75 TABLET ORAL DAILY
Qty: 45 TABLET | Refills: 0 | Status: SHIPPED | OUTPATIENT
Start: 2023-08-07

## 2023-08-08 RX ORDER — MEDROXYPROGESTERONE ACETATE 10 MG/1
10 TABLET ORAL DAILY
Qty: 30 TABLET | Refills: 3 | Status: SHIPPED | OUTPATIENT
Start: 2023-08-08

## 2023-08-08 NOTE — PROGRESS NOTES
8/8/23    Luis Chopra  1950    Chief Complaint   Patient presents with    Post-Op Check     Pt had hysteroscopy D&C 7/13/23 due to PMB. States she is doing well since procedure. No complaints         Luis Chopra is a 75 y.o. female who presents today for evaluation of pathology results. She underwent a hysteroscopy D&C and pathology revealed endometrial hyperplasia without atypia. Past Medical History:   Diagnosis Date    Arthritis     generalized OA    DVT of lower extremity (deep venous thrombosis) ([address])     Left 2012    Fracture of left ankle 02/01/2004    GERD (gastroesophageal reflux disease)     H/O Doppler ultrasound 11/02/2014    Venous Doppler. Left lower extremity probably old DVT in the distal SFA and popliteal areas otherwise right lower extremity reveals normal findings. H/O Doppler ultrasound 09/19/2017    LLE - chronic DVT, severe veous reflux    H/O echocardiogram 09/26/2016    EF60% mild MR, TR, mild pulm htn    H/O echocardiogram 07/18/2018    EF55-60% mild TR    Homocystinemia (HCC)     Hx of blood clots     \"hx of PE- 2013 and had DVT left leg that went to my lung\"    Hx of Doppler echocardiogram 12/22/2017    EF 50-60% mild biatrial enlargement, mild to mod TR, mildly elevated pulmonary artery pressure. Hyperlipidemia     Hypertension, essential     Left leg swelling     wears compression stocking    Left Lower Extremity Venous Doppler ultrasound 09/16/2021    Evidence of partially occlusive chronic DVT in the left proximal and mid SFV. Non-alcoholic fatty liver disease     Obesity     Osteoarthritis     Other chest pain 12/17/2018    Postmenopausal bleeding     Pulmonary embolism ([address]) 2012    Bilateral    Spinal stenosis     lumbar spine--scheduled for epidural injection 3/19/2018    Varicose veins of lower extremity     Wears glasses        Past Surgical History:   Procedure Laterality Date    ANKLE SURGERY Left 02/2004    \"have plate and pins still\"    COLONOSCOPY  2010?

## 2023-08-15 NOTE — PATIENT INSTRUCTIONS
Hospitalist Progress Note    CC: Azotemia    Name:  Dorota Hobson /Age/Sex: 1955  (70 y.o. male)   MRN & CSN:  [id number] & [id number] Encounter Date/Time: 8/15/2023 1:56 PM EDT   Location:   PCP: Steven Wood MD     Attending:Larry Orlando MD         Hospital course:  70 y.o. male with a significant past medical history of hypertension, hyperlipidemia, type 2 diabetes, chronic diastolic heart failure, COPD, CAD, and CKD who presents to [facility] emergency permit from [facility] with reports of elevated BUN on routine blood work. He notes recently has been having mild symptoms of fatigue and weakness at nursing home which prompted blood work. Blood work from today's encounter is not available for review but repeat blood work was performed here in the ED. Pertinent findings include sodium 131, chloride 92, , creatinine 2.1, magnesium 2.7, blood sugar 410, proBNP 2373, initial at bedtime troponin 66 with repeat of 65. Nephrology was consulted, recommended holding diuretic therapy, slow MIVF, repeat lab work in the morning. Patient denies any other symptoms of the chest pain, dyspnea, fever, or poor urinary output. Chest x-ray tonight showed stable chronic cardiomegaly with stable central pulmonary congestion. Patient received following cc bolus in the emergency department. PT evaluated by nephrology. He has had multiple admissions for fluid overload and his renal function is very sensitive to diuretics.     Admit date: 2023  Days in hospital:  Hospital Day: 5  Status:  Inpatient       Subjective: : Patient have no overnight complaints states his abdomen is still swollen otherwise feeling okay    Patient had a bowel movement yesterday,       ROS:   A comprehensive review of systems was negative except for: chronic pain, abd more distended, legs slightly less swollen, constipated Injection in right hip bursa  Follow-up as needed, if pain returns it is okay to call the office for another injection if it has been at least 6 weeks.

## 2023-08-21 RX ORDER — LOSARTAN POTASSIUM AND HYDROCHLOROTHIAZIDE 25; 100 MG/1; MG/1
1 TABLET ORAL DAILY
Qty: 90 TABLET | Refills: 0 | Status: SHIPPED | OUTPATIENT
Start: 2023-08-21

## 2023-08-21 RX ORDER — FOLIC ACID 1 MG/1
1 TABLET ORAL DAILY
Qty: 90 TABLET | Refills: 1 | Status: SHIPPED | OUTPATIENT
Start: 2023-08-21

## 2023-08-21 NOTE — TELEPHONE ENCOUNTER
Requested Prescriptions     Signed Prescriptions Disp Refills    losartan-hydroCHLOROthiazide (HYZAAR) 100-25 MG per tablet 90 tablet 0     Sig: Take 1 tablet by mouth daily     Authorizing Provider: Marii Hernandez     Ordering User: CHENG Kent    folic acid (FOLVITE) 1 MG tablet 90 tablet 1     Sig: Take 1 tablet by mouth daily     Authorizing Provider: Marii Hernandez     Ordering User: Brittney Mercado [id number]

## 2023-09-05 RX ORDER — PRAVASTATIN SODIUM 10 MG
10 TABLET ORAL DAILY
Qty: 90 TABLET | Refills: 1 | Status: SHIPPED | OUTPATIENT
Start: 2023-09-05 | End: 2023-12-04

## 2023-09-05 RX ORDER — MELOXICAM 7.5 MG/1
3.75 TABLET ORAL DAILY
Qty: 45 TABLET | Refills: 0 | Status: SHIPPED | OUTPATIENT
Start: 2023-09-05

## 2023-09-05 RX ORDER — ASPIRIN 81 MG/1
81 TABLET ORAL DAILY
Qty: 384 TABLET | Refills: 0 | OUTPATIENT
Start: 2023-09-05

## 2023-09-11 LAB
CHOLEST SERPL-MCNC: 214 MG/DL
GLUCOSE SERPL-MCNC: 76 MG/DL (ref 70–99)
HDLC SERPL-MCNC: 59 MG/DL
LDLC SERPL CALC-MCNC: 129 MG/DL
PATIENT FASTING?: YES
TRIGL SERPL-MCNC: 131 MG/DL

## 2023-09-14 ENCOUNTER — OFFICE VISIT (OUTPATIENT)
Dept: FAMILY MEDICINE CLINIC | Age: 73
End: 2023-09-14
Payer: COMMERCIAL

## 2023-09-14 VITALS
HEART RATE: 56 BPM | BODY MASS INDEX: 41.85 KG/M2 | OXYGEN SATURATION: 98 % | HEIGHT: 64 IN | SYSTOLIC BLOOD PRESSURE: 104 MMHG | RESPIRATION RATE: 16 BRPM | DIASTOLIC BLOOD PRESSURE: 60 MMHG | WEIGHT: 245.1 LBS

## 2023-09-14 DIAGNOSIS — I83.893 VARICOSE VEINS OF LOWER EXTREMITIES WITH COMPLICATIONS, BILATERAL: ICD-10-CM

## 2023-09-14 DIAGNOSIS — I27.82 CHRONIC PULMONARY EMBOLISM, UNSPECIFIED PULMONARY EMBOLISM TYPE, UNSPECIFIED WHETHER ACUTE COR PULMONALE PRESENT (HCC): ICD-10-CM

## 2023-09-14 DIAGNOSIS — G25.81 RLS (RESTLESS LEGS SYNDROME): ICD-10-CM

## 2023-09-14 DIAGNOSIS — E66.01 CLASS 3 SEVERE OBESITY DUE TO EXCESS CALORIES WITHOUT SERIOUS COMORBIDITY WITH BODY MASS INDEX (BMI) OF 40.0 TO 44.9 IN ADULT (HCC): Chronic | ICD-10-CM

## 2023-09-14 DIAGNOSIS — I10 HYPERTENSION, ESSENTIAL: Primary | ICD-10-CM

## 2023-09-14 DIAGNOSIS — I82.5Y2 CHRONIC DEEP VEIN THROMBOSIS (DVT) OF PROXIMAL VEIN OF LEFT LOWER EXTREMITY (HCC): ICD-10-CM

## 2023-09-14 DIAGNOSIS — M15.9 GENERALIZED OA: Chronic | ICD-10-CM

## 2023-09-14 DIAGNOSIS — E78.5 HYPERLIPIDEMIA, UNSPECIFIED HYPERLIPIDEMIA TYPE: ICD-10-CM

## 2023-09-14 PROCEDURE — 99214 OFFICE O/P EST MOD 30 MIN: CPT | Performed by: FAMILY MEDICINE

## 2023-09-14 PROCEDURE — 3078F DIAST BP <80 MM HG: CPT | Performed by: FAMILY MEDICINE

## 2023-09-14 PROCEDURE — 3074F SYST BP LT 130 MM HG: CPT | Performed by: FAMILY MEDICINE

## 2023-09-14 PROCEDURE — 1123F ACP DISCUSS/DSCN MKR DOCD: CPT | Performed by: FAMILY MEDICINE

## 2023-09-14 RX ORDER — PRAMIPEXOLE DIHYDROCHLORIDE 0.12 MG/1
0.12 TABLET ORAL NIGHTLY
Qty: 90 TABLET | Refills: 1 | Status: SHIPPED | OUTPATIENT
Start: 2023-09-14

## 2023-09-14 ASSESSMENT — ENCOUNTER SYMPTOMS
WHEEZING: 0
BLOOD IN STOOL: 0
EYE PAIN: 0
TROUBLE SWALLOWING: 0
VOMITING: 0
ABDOMINAL PAIN: 0
BACK PAIN: 1
NAUSEA: 0
CHEST TIGHTNESS: 0
DIARRHEA: 0
SHORTNESS OF BREATH: 0

## 2023-09-14 NOTE — PROGRESS NOTES
Pulmonary effort is normal. No respiratory distress. Breath sounds: Normal breath sounds. No wheezing, rhonchi or rales. Abdominal:      Palpations: Abdomen is soft. Musculoskeletal:         General: Swelling present. No deformity. Normal range of motion. Cervical back: Normal range of motion and neck supple. No rigidity. Lymphadenopathy:      Cervical: No cervical adenopathy. Skin:     General: Skin is warm and dry. Coloration: Skin is not jaundiced. Findings: No bruising. Neurological:      General: No focal deficit present. Mental Status: She is alert and oriented to person, place, and time. Mental status is at baseline. Cranial Nerves: No cranial nerve deficit. Motor: No weakness. Gait: Gait abnormal.   Psychiatric:         Mood and Affect: Mood normal.         Behavior: Behavior normal.         Thought Content: Thought content normal.         ASSESSMENT & PLAN     Diagnosis Orders   1. Hypertension, essential  Basic Metabolic Panel      2. Class 3 severe obesity due to excess calories without serious comorbidity with body mass index (BMI) of 40.0 to 44.9 in adult [facility])  [employer] [facility]      3. Chronic deep vein thrombosis (DVT) of proximal vein of left lower extremity (HCC)        4. Generalized OA        5. Chronic pulmonary embolism, unspecified pulmonary embolism type, unspecified whether acute cor pulmonale present ([address])        6. Hyperlipidemia, unspecified hyperlipidemia type        7. Varicose veins of lower extremities with complications, bilateral        8. RLS (restless legs syndrome)        Trial of Mirapex for restless leg syndrome as listed 0.125 q. evening and observe. Make referral to [facility] for increased weight and we will check a BMP today. Med list reviewed- no refills needed at this point. Patient advised to get a high-dose flu shot and new COVID shot when available in the next few weeks.

## 2023-10-23 ENCOUNTER — OFFICE VISIT MH/BH (OUTPATIENT)
Dept: BARIATRICS/WEIGHT MGMT | Age: 73
End: 2023-10-23
Payer: COMMERCIAL

## 2023-10-23 VITALS
BODY MASS INDEX: 42.17 KG/M2 | DIASTOLIC BLOOD PRESSURE: 82 MMHG | HEIGHT: 64 IN | SYSTOLIC BLOOD PRESSURE: 134 MMHG | HEART RATE: 62 BPM | WEIGHT: 247 LBS | OXYGEN SATURATION: 96 %

## 2023-10-23 DIAGNOSIS — E66.01 MORBID OBESITY WITH BMI OF 40.0-44.9, ADULT (HCC): ICD-10-CM

## 2023-10-23 DIAGNOSIS — Z79.899 MEDICATION MANAGEMENT: Primary | ICD-10-CM

## 2023-10-23 PROCEDURE — 1123F ACP DISCUSS/DSCN MKR DOCD: CPT | Performed by: NURSE PRACTITIONER

## 2023-10-23 PROCEDURE — 99204 OFFICE O/P NEW MOD 45 MIN: CPT | Performed by: NURSE PRACTITIONER

## 2023-10-23 PROCEDURE — 3079F DIAST BP 80-89 MM HG: CPT | Performed by: NURSE PRACTITIONER

## 2023-10-23 PROCEDURE — 93000 ELECTROCARDIOGRAM COMPLETE: CPT | Performed by: NURSE PRACTITIONER

## 2023-10-23 PROCEDURE — 3075F SYST BP GE 130 - 139MM HG: CPT | Performed by: NURSE PRACTITIONER

## 2023-10-23 RX ORDER — PHENTERMINE HYDROCHLORIDE 37.5 MG/1
37.5 TABLET ORAL
Qty: 30 TABLET | Refills: 0 | Status: SHIPPED | OUTPATIENT
Start: 2023-10-23 | End: 2023-11-22

## 2023-11-02 ENCOUNTER — OFFICE VISIT (OUTPATIENT)
Dept: BARIATRICS/WEIGHT MGMT | Age: 73
End: 2023-11-02

## 2023-11-02 VITALS — BODY MASS INDEX: 41.38 KG/M2 | HEIGHT: 64 IN | WEIGHT: 242.4 LBS

## 2023-11-02 DIAGNOSIS — E66.01 MORBID OBESITY WITH BMI OF 40.0-44.9, ADULT (HCC): Primary | ICD-10-CM

## 2023-11-02 PROCEDURE — NBSRV NON-BILLABLE SERVICE

## 2023-11-07 ENCOUNTER — HOSPITAL ENCOUNTER (OUTPATIENT)
Dept: WOMENS IMAGING | Age: 73
Discharge: HOME | End: 2023-11-07
Payer: COMMERCIAL

## 2023-11-07 DIAGNOSIS — Z12.31 ENCOUNTER FOR SCREENING MAMMOGRAM FOR BREAST CANCER: ICD-10-CM

## 2023-11-07 PROCEDURE — 77063 BREAST TOMOSYNTHESIS BI: CPT

## 2023-11-10 RX ORDER — MELOXICAM 7.5 MG/1
3.75 TABLET ORAL DAILY
Qty: 45 TABLET | Refills: 0 | Status: SHIPPED | OUTPATIENT
Start: 2023-11-10

## 2023-11-10 RX ORDER — LOSARTAN POTASSIUM AND HYDROCHLOROTHIAZIDE 25; 100 MG/1; MG/1
1 TABLET ORAL DAILY
Qty: 90 TABLET | Refills: 0 | Status: SHIPPED | OUTPATIENT
Start: 2023-11-10

## 2023-11-22 ENCOUNTER — OFFICE VISIT (OUTPATIENT)
Dept: BARIATRICS/WEIGHT MGMT | Age: 73
End: 2023-11-22

## 2023-11-22 VITALS
OXYGEN SATURATION: 99 % | WEIGHT: 238 LBS | DIASTOLIC BLOOD PRESSURE: 70 MMHG | HEART RATE: 64 BPM | BODY MASS INDEX: 40.63 KG/M2 | HEIGHT: 64 IN | SYSTOLIC BLOOD PRESSURE: 118 MMHG

## 2023-11-22 DIAGNOSIS — E66.01 MORBID OBESITY WITH BMI OF 40.0-44.9, ADULT (HCC): ICD-10-CM

## 2023-11-22 DIAGNOSIS — Z79.899 MEDICATION MANAGEMENT: Primary | ICD-10-CM

## 2023-11-22 RX ORDER — PHENTERMINE HYDROCHLORIDE 37.5 MG/1
37.5 TABLET ORAL
Qty: 30 TABLET | Refills: 0 | Status: SHIPPED | OUTPATIENT
Start: 2023-11-22 | End: 2023-12-22

## 2023-12-14 RX ORDER — PRAMIPEXOLE DIHYDROCHLORIDE 0.12 MG/1
0.12 TABLET ORAL NIGHTLY
Qty: 90 TABLET | Refills: 1 | OUTPATIENT
Start: 2023-12-14

## 2023-12-15 RX ORDER — PRAMIPEXOLE DIHYDROCHLORIDE 0.12 MG/1
0.12 TABLET ORAL NIGHTLY
Qty: 90 TABLET | Refills: 1 | Status: SHIPPED | OUTPATIENT
Start: 2023-12-15

## 2024-01-15 ENCOUNTER — HOSPITAL ENCOUNTER (OUTPATIENT)
Age: 74
Discharge: HOME | End: 2024-01-15
Payer: COMMERCIAL

## 2024-01-15 DIAGNOSIS — I10 HYPERTENSION, ESSENTIAL: ICD-10-CM

## 2024-01-15 LAB
ANION GAP SERPL CALCULATED.3IONS-SCNC: 14 MMOL/L (ref 7–16)
BUN SERPL-MCNC: 20 MG/DL (ref 6–23)
CALCIUM SERPL-MCNC: 9.2 MG/DL (ref 8.3–10.6)
CHLORIDE BLD-SCNC: 102 MMOL/L (ref 99–110)
CO2: 23 MMOL/L (ref 21–32)
CREAT SERPL-MCNC: 1.1 MG/DL (ref 0.6–1.1)
GFR SERPL CREATININE-BSD FRML MDRD: 53 ML/MIN/1.73M2
GLUCOSE SERPL-MCNC: 93 MG/DL (ref 70–99)
POTASSIUM SERPL-SCNC: 3.6 MMOL/L (ref 3.5–5.1)
SODIUM BLD-SCNC: 139 MMOL/L (ref 135–145)

## 2024-01-15 PROCEDURE — 36415 COLL VENOUS BLD VENIPUNCTURE: CPT

## 2024-01-15 PROCEDURE — 80048 BASIC METABOLIC PNL TOTAL CA: CPT

## 2024-01-17 ENCOUNTER — OFFICE VISIT (OUTPATIENT)
Dept: BARIATRICS/WEIGHT MGMT | Age: 74
End: 2024-01-17
Payer: COMMERCIAL

## 2024-01-17 VITALS
HEIGHT: 64 IN | DIASTOLIC BLOOD PRESSURE: 64 MMHG | BODY MASS INDEX: 38.43 KG/M2 | SYSTOLIC BLOOD PRESSURE: 100 MMHG | WEIGHT: 225.1 LBS | OXYGEN SATURATION: 97 % | HEART RATE: 66 BPM

## 2024-01-17 DIAGNOSIS — E66.9 OBESITY (BMI 30-39.9): ICD-10-CM

## 2024-01-17 DIAGNOSIS — Z79.899 MEDICATION MANAGEMENT: Primary | ICD-10-CM

## 2024-01-17 PROCEDURE — 3074F SYST BP LT 130 MM HG: CPT | Performed by: NURSE PRACTITIONER

## 2024-01-17 PROCEDURE — 99214 OFFICE O/P EST MOD 30 MIN: CPT | Performed by: NURSE PRACTITIONER

## 2024-01-17 PROCEDURE — 3078F DIAST BP <80 MM HG: CPT | Performed by: NURSE PRACTITIONER

## 2024-01-17 PROCEDURE — 1123F ACP DISCUSS/DSCN MKR DOCD: CPT | Performed by: NURSE PRACTITIONER

## 2024-01-17 RX ORDER — PHENTERMINE HYDROCHLORIDE 37.5 MG/1
37.5 TABLET ORAL
Qty: 30 TABLET | Refills: 0 | Status: SHIPPED | OUTPATIENT
Start: 2024-01-17 | End: 2024-02-16

## 2024-01-17 ASSESSMENT — ENCOUNTER SYMPTOMS: CONSTIPATION: 1

## 2024-01-17 NOTE — PROGRESS NOTES
Chief Complaint   Patient presents with    Weight Management     Med  Adipex # 4         SUBJECTIVE:    HPI: Patient is here with complaints of: Monthly Adipex visit.    Obesity with a BMI of Body mass index is 38.64 kg/m²..    Current weight: 225 pounds    Weight change since last visit: -6.8 pounds (if pt failed to lose weight, cannot remain on medication).    Month 4 of being on Adipex.     Any new absolute contraindications (glaucoma, drug abuse, CAD, uncontrolled HTN, arrhythmias, stroke, CHF, hyperthyroidism, MAOI use, pregnant or breastfeeding) to being on medication: DENIES     Pt complains of the following side effects: Constipation     Current dietary measures: calorie awareness, low carb, drinking decaf tea     Current exercise measures: walking at work    I have reviewed the patient's(pertinent information to this visit) medical history, family history(scanned in  the [employer] under \"patient questioner\"), social history and review of systems with the patient today in the office.          Past Surgical History:   Procedure Laterality Date    ANKLE SURGERY Left 02/2004    \"have plate and pins still\"    COLONOSCOPY  2010?    DILATION AND CURETTAGE OF UTERUS  May 2012    TONSILLECTOMY      age 4    TOTAL KNEE ARTHROPLASTY Left 4/23/2019    KNEE TOTAL ARTHROPLASTY LEFT performed by Salvatore Medina DO at [facility] OR    WISDOM TOOTH EXTRACTION         Past Medical History:   Diagnosis Date    Anticoagulant long-term use     Arthritis     generalized OA    DVT of lower extremity (deep venous thrombosis) (HCC)     Left 2012    Fracture of left ankle 02/01/2004    GERD (gastroesophageal reflux disease)     H/O Doppler ultrasound 11/02/2014    Venous Doppler. Left lower extremity probably old DVT in the distal SFA and popliteal areas otherwise right lower extremity reveals normal findings.    H/O Doppler ultrasound 09/19/2017    LLE - chronic DVT, severe veous reflux    H/O echocardiogram 09/26/2016    EF60% mild

## 2024-01-23 ENCOUNTER — TELEPHONE (OUTPATIENT)
Dept: FAMILY MEDICINE CLINIC | Age: 74
End: 2024-01-23

## 2024-01-23 NOTE — TELEPHONE ENCOUNTER
Spoke to patient per Dr. Cavazos's result note. She states she is not taking meloxicam. Marked it as not taking.

## 2024-02-19 ENCOUNTER — OFFICE VISIT (OUTPATIENT)
Dept: BARIATRICS/WEIGHT MGMT | Age: 74
End: 2024-02-19
Payer: COMMERCIAL

## 2024-02-19 VITALS
WEIGHT: 220.1 LBS | HEIGHT: 64 IN | BODY MASS INDEX: 37.58 KG/M2 | SYSTOLIC BLOOD PRESSURE: 124 MMHG | DIASTOLIC BLOOD PRESSURE: 80 MMHG | HEART RATE: 76 BPM | OXYGEN SATURATION: 100 %

## 2024-02-19 DIAGNOSIS — Z79.899 MEDICATION MANAGEMENT: Primary | ICD-10-CM

## 2024-02-19 DIAGNOSIS — E66.9 OBESITY (BMI 30-39.9): ICD-10-CM

## 2024-02-19 PROCEDURE — 99214 OFFICE O/P EST MOD 30 MIN: CPT | Performed by: NURSE PRACTITIONER

## 2024-02-19 PROCEDURE — 3079F DIAST BP 80-89 MM HG: CPT | Performed by: NURSE PRACTITIONER

## 2024-02-19 PROCEDURE — 1123F ACP DISCUSS/DSCN MKR DOCD: CPT | Performed by: NURSE PRACTITIONER

## 2024-02-19 PROCEDURE — 3074F SYST BP LT 130 MM HG: CPT | Performed by: NURSE PRACTITIONER

## 2024-02-19 RX ORDER — FOLIC ACID 1 MG/1
1 TABLET ORAL DAILY
Qty: 90 TABLET | Refills: 1 | Status: SHIPPED | OUTPATIENT
Start: 2024-02-19

## 2024-02-19 RX ORDER — MELOXICAM 7.5 MG/1
3.75 TABLET ORAL DAILY
Qty: 45 TABLET | Refills: 0 | Status: SHIPPED | OUTPATIENT
Start: 2024-02-19

## 2024-02-19 RX ORDER — PRAVASTATIN SODIUM 10 MG
10 TABLET ORAL DAILY
Qty: 90 TABLET | Refills: 1 | Status: SHIPPED | OUTPATIENT
Start: 2024-02-19 | End: 2024-08-17

## 2024-02-19 RX ORDER — LOSARTAN POTASSIUM AND HYDROCHLOROTHIAZIDE 25; 100 MG/1; MG/1
1 TABLET ORAL DAILY
Qty: 90 TABLET | Refills: 0 | Status: SHIPPED | OUTPATIENT
Start: 2024-02-19

## 2024-02-19 NOTE — PROGRESS NOTES
Chief Complaint   Patient presents with    Weight Management     Med wm adipex 5          SUBJECTIVE:    HPI: Patient is here with complaints of: Monthly Adipex visit.    Obesity with a BMI of Body mass index is 37.78 kg/m²..    Current weight: 220 pounds    Weight change since last visit: -5 pounds (if pt failed to lose weight, cannot remain on medication).    Month 5 of being on Adipex.     Any new absolute contraindications (glaucoma, drug abuse, CAD, uncontrolled HTN, arrhythmias, stroke, CHF, hyperthyroidism, MAOI use, pregnant or breastfeeding) to being on medication: DENIES     Pt complains of the following side effects: DENIES    Current dietary measures: portion control and healthier foods     Current exercise measures: some walking; not consistent    I have reviewed the patient's(pertinent information to this visit) medical history, family history(scanned in  the [employer] under \"patient questioner\"), social history and review of systems with the patient today in the office.          Past Surgical History:   Procedure Laterality Date    ANKLE SURGERY Left 02/2004    \"have plate and pins still\"    COLONOSCOPY  2010?    DILATION AND CURETTAGE OF UTERUS  May 2012    TONSILLECTOMY      age 4    TOTAL KNEE ARTHROPLASTY Left 4/23/2019    KNEE TOTAL ARTHROPLASTY LEFT performed by Salvatore Medina DO at [facility] OR    WISDOM TOOTH EXTRACTION         Past Medical History:   Diagnosis Date    Anticoagulant long-term use     Arthritis     generalized OA    DVT of lower extremity (deep venous thrombosis) (HCC)     Left 2012    Fracture of left ankle 02/01/2004    GERD (gastroesophageal reflux disease)     H/O Doppler ultrasound 11/02/2014    Venous Doppler. Left lower extremity probably old DVT in the distal SFA and popliteal areas otherwise right lower extremity reveals normal findings.    H/O Doppler ultrasound 09/19/2017    LLE - chronic DVT, severe veous reflux    H/O echocardiogram 09/26/2016    EF60% mild MR, TR,

## 2024-03-11 ENCOUNTER — TELEPHONE (OUTPATIENT)
Dept: FAMILY MEDICINE CLINIC | Age: 74
End: 2024-03-11

## 2024-03-11 DIAGNOSIS — E78.5 HYPERLIPIDEMIA, UNSPECIFIED HYPERLIPIDEMIA TYPE: ICD-10-CM

## 2024-03-11 DIAGNOSIS — M25.50 ARTHRALGIA, UNSPECIFIED JOINT: ICD-10-CM

## 2024-03-11 DIAGNOSIS — I10 HYPERTENSION, ESSENTIAL: Primary | ICD-10-CM

## 2024-03-11 DIAGNOSIS — I27.82 CHRONIC PULMONARY EMBOLISM, UNSPECIFIED PULMONARY EMBOLISM TYPE, UNSPECIFIED WHETHER ACUTE COR PULMONALE PRESENT (HCC): ICD-10-CM

## 2024-03-11 DIAGNOSIS — I10 HYPERTENSION, ESSENTIAL: ICD-10-CM

## 2024-03-11 DIAGNOSIS — M15.9 GENERALIZED OA: Primary | Chronic | ICD-10-CM

## 2024-03-11 NOTE — TELEPHONE ENCOUNTER
PT WANTS TO GET THE F/U LAB DONE TODAY BUT HAS NO ORDER IN CHART. CAN IT BE PUT IN AND PT IS GOING TO [facility] LAB TO GET IT DONE

## 2024-03-12 ENCOUNTER — HOSPITAL ENCOUNTER (OUTPATIENT)
Age: 74
Discharge: HOME | End: 2024-03-12
Payer: COMMERCIAL

## 2024-03-12 DIAGNOSIS — M15.9 GENERALIZED OA: Chronic | ICD-10-CM

## 2024-03-12 DIAGNOSIS — E78.5 HYPERLIPIDEMIA, UNSPECIFIED HYPERLIPIDEMIA TYPE: ICD-10-CM

## 2024-03-12 DIAGNOSIS — I27.82 CHRONIC PULMONARY EMBOLISM, UNSPECIFIED PULMONARY EMBOLISM TYPE, UNSPECIFIED WHETHER ACUTE COR PULMONALE PRESENT (HCC): ICD-10-CM

## 2024-03-12 DIAGNOSIS — M25.50 ARTHRALGIA, UNSPECIFIED JOINT: ICD-10-CM

## 2024-03-12 DIAGNOSIS — I10 HYPERTENSION, ESSENTIAL: ICD-10-CM

## 2024-03-12 LAB
ALBUMIN SERPL-MCNC: 4.3 GM/DL (ref 3.4–5)
ALP BLD-CCNC: 103 IU/L (ref 40–128)
ALT SERPL-CCNC: 19 U/L (ref 10–40)
ANION GAP SERPL CALCULATED.3IONS-SCNC: 14 MMOL/L (ref 7–16)
AST SERPL-CCNC: 22 IU/L (ref 15–37)
BILIRUB SERPL-MCNC: 0.6 MG/DL (ref 0–1)
BUN SERPL-MCNC: 14 MG/DL (ref 6–23)
CALCIUM SERPL-MCNC: 10 MG/DL (ref 8.3–10.6)
CHLORIDE BLD-SCNC: 100 MMOL/L (ref 99–110)
CHOLEST SERPL-MCNC: 187 MG/DL
CO2: 24 MMOL/L (ref 21–32)
CREAT SERPL-MCNC: 0.8 MG/DL (ref 0.6–1.1)
GFR SERPL CREATININE-BSD FRML MDRD: >60 ML/MIN/1.73M2
GLUCOSE SERPL-MCNC: 81 MG/DL (ref 70–99)
HCT VFR BLD CALC: 45.1 % (ref 37–47)
HDLC SERPL-MCNC: 60 MG/DL
HEMOGLOBIN: 14 GM/DL (ref 12.5–16)
LDLC SERPL CALC-MCNC: 105 MG/DL
MCH RBC QN AUTO: 27.7 PG (ref 27–31)
MCHC RBC AUTO-ENTMCNC: 31 % (ref 32–36)
MCV RBC AUTO: 89.1 FL (ref 78–100)
PDW BLD-RTO: 13.6 % (ref 11.7–14.9)
PLATELET # BLD: 284 K/CU MM (ref 140–440)
PMV BLD AUTO: 11.3 FL (ref 7.5–11.1)
POTASSIUM SERPL-SCNC: 3.7 MMOL/L (ref 3.5–5.1)
RBC # BLD: 5.06 M/CU MM (ref 4.2–5.4)
SODIUM BLD-SCNC: 138 MMOL/L (ref 135–145)
TOTAL PROTEIN: 6.9 GM/DL (ref 6.4–8.2)
TRIGL SERPL-MCNC: 109 MG/DL
URATE SERPL-MCNC: 6.3 MG/DL (ref 2.6–6)
WBC # BLD: 8.1 K/CU MM (ref 4–10.5)

## 2024-03-12 PROCEDURE — 36415 COLL VENOUS BLD VENIPUNCTURE: CPT

## 2024-03-12 PROCEDURE — 80053 COMPREHEN METABOLIC PANEL: CPT

## 2024-03-12 PROCEDURE — 84550 ASSAY OF BLOOD/URIC ACID: CPT

## 2024-03-12 PROCEDURE — 80061 LIPID PANEL: CPT

## 2024-03-12 PROCEDURE — 85027 COMPLETE CBC AUTOMATED: CPT

## 2024-03-13 ENCOUNTER — OFFICE VISIT (OUTPATIENT)
Dept: FAMILY MEDICINE CLINIC | Age: 74
End: 2024-03-13
Payer: COMMERCIAL

## 2024-03-13 VITALS
HEART RATE: 74 BPM | BODY MASS INDEX: 37.03 KG/M2 | SYSTOLIC BLOOD PRESSURE: 108 MMHG | DIASTOLIC BLOOD PRESSURE: 68 MMHG | OXYGEN SATURATION: 99 % | WEIGHT: 216.9 LBS | RESPIRATION RATE: 16 BRPM | HEIGHT: 64 IN

## 2024-03-13 DIAGNOSIS — K76.0 NON-ALCOHOLIC FATTY LIVER DISEASE: ICD-10-CM

## 2024-03-13 DIAGNOSIS — M25.50 ARTHRALGIA, UNSPECIFIED JOINT: ICD-10-CM

## 2024-03-13 DIAGNOSIS — G25.81 RLS (RESTLESS LEGS SYNDROME): ICD-10-CM

## 2024-03-13 DIAGNOSIS — I10 HYPERTENSION, ESSENTIAL: Primary | ICD-10-CM

## 2024-03-13 DIAGNOSIS — I27.82 CHRONIC PULMONARY EMBOLISM, UNSPECIFIED PULMONARY EMBOLISM TYPE, UNSPECIFIED WHETHER ACUTE COR PULMONALE PRESENT (HCC): ICD-10-CM

## 2024-03-13 DIAGNOSIS — R63.4 WEIGHT LOSS: ICD-10-CM

## 2024-03-13 DIAGNOSIS — K21.9 GASTROESOPHAGEAL REFLUX DISEASE, UNSPECIFIED WHETHER ESOPHAGITIS PRESENT: ICD-10-CM

## 2024-03-13 DIAGNOSIS — E66.01 SEVERE OBESITY (BMI 35.0-39.9) WITH COMORBIDITY (HCC): ICD-10-CM

## 2024-03-13 DIAGNOSIS — E66.01 CLASS 3 SEVERE OBESITY DUE TO EXCESS CALORIES WITHOUT SERIOUS COMORBIDITY WITH BODY MASS INDEX (BMI) OF 40.0 TO 44.9 IN ADULT (HCC): Chronic | ICD-10-CM

## 2024-03-13 PROCEDURE — 99214 OFFICE O/P EST MOD 30 MIN: CPT | Performed by: FAMILY MEDICINE

## 2024-03-13 PROCEDURE — 1123F ACP DISCUSS/DSCN MKR DOCD: CPT | Performed by: FAMILY MEDICINE

## 2024-03-13 PROCEDURE — 3078F DIAST BP <80 MM HG: CPT | Performed by: FAMILY MEDICINE

## 2024-03-13 PROCEDURE — 3074F SYST BP LT 130 MM HG: CPT | Performed by: FAMILY MEDICINE

## 2024-03-13 ASSESSMENT — PATIENT HEALTH QUESTIONNAIRE - PHQ9
2. FEELING DOWN, DEPRESSED OR HOPELESS: 0
1. LITTLE INTEREST OR PLEASURE IN DOING THINGS: 0
SUM OF ALL RESPONSES TO PHQ QUESTIONS 1-9: 0
SUM OF ALL RESPONSES TO PHQ9 QUESTIONS 1 & 2: 0
SUM OF ALL RESPONSES TO PHQ QUESTIONS 1-9: 0
SUM OF ALL RESPONSES TO PHQ QUESTIONS 1-9: 0

## 2024-03-13 ASSESSMENT — ENCOUNTER SYMPTOMS
BLOOD IN STOOL: 0
SHORTNESS OF BREATH: 0
BACK PAIN: 1
VOMITING: 0
EYE PAIN: 0
DIARRHEA: 0
ABDOMINAL PAIN: 0
TROUBLE SWALLOWING: 0
CHEST TIGHTNESS: 0
NAUSEA: 0
WHEEZING: 0

## 2024-03-13 NOTE — PROGRESS NOTES
3/13/2024    Ayesha Martinez    Chief Complaint   Patient presents with    6 Month Follow-Up    Blood Pressure Check     Blood pressure. Taking 1/2 losartan and went off metoprolol. She has lost weight and decided to change her medication.       HPI  History was obtained from the patient.  Ayesha is a 73 y.o. female who presents today with routine recheck.  Patient with history of hypertension, arthritis, obesity, hyperlipidemia, GERD, remote recurrent DVT and PE currently on NOAC therapy, nonalcoholic fatty liver disease, and restless leg syndrome.    Overall she is feeling quite well she has lost significant amount of weight over the last couple months maybe up to a total of 40 pounds in the last year or so.  Pressure looks good today she has been able to cut her losartan dose in half and went off her metoprolol altogether.  In addition with the weight loss she has been able to cut back on her meloxicam greatly and drinking more fluids her renal numbers are actually improved she had labs done yesterday morning that look great.  Her labs included a CBC, CMP, lipid panel and uric acid of 6.3.  Please note she has had no chronic recent flares.  Mammogram was last done in November 23 continues to work full-time.  Patient's shots overall are up-to-date we did discuss the fact that the CDC is recommending another COVID shot for people over 65 has been 4-month since her last COVID shot to keep them safe tell the new COVID shot is available in the fall of this year.  Patient's arthritic symptoms and spinal stenosis and back pain is about the same restless leg symptoms are doing well when she takes her medication.  GERD symptoms are unchanged and she continues to tolerate her current medications including her Xarelto.    REVIEW OF SYMPTOMS    Review of Systems   Constitutional:  Negative for activity change and fatigue.   HENT:  Negative for congestion, hearing loss, mouth sores and trouble swallowing.    Eyes:  Negative for

## 2024-03-18 ENCOUNTER — OFFICE VISIT (OUTPATIENT)
Dept: BARIATRICS/WEIGHT MGMT | Age: 74
End: 2024-03-18
Payer: COMMERCIAL

## 2024-03-18 VITALS
HEIGHT: 64 IN | SYSTOLIC BLOOD PRESSURE: 116 MMHG | OXYGEN SATURATION: 97 % | WEIGHT: 215.6 LBS | BODY MASS INDEX: 36.81 KG/M2 | HEART RATE: 75 BPM | DIASTOLIC BLOOD PRESSURE: 70 MMHG

## 2024-03-18 DIAGNOSIS — Z79.899 MEDICATION MANAGEMENT: Primary | ICD-10-CM

## 2024-03-18 DIAGNOSIS — E66.9 OBESITY (BMI 30-39.9): ICD-10-CM

## 2024-03-18 PROCEDURE — 3078F DIAST BP <80 MM HG: CPT | Performed by: NURSE PRACTITIONER

## 2024-03-18 PROCEDURE — 1123F ACP DISCUSS/DSCN MKR DOCD: CPT | Performed by: NURSE PRACTITIONER

## 2024-03-18 PROCEDURE — 3074F SYST BP LT 130 MM HG: CPT | Performed by: NURSE PRACTITIONER

## 2024-03-18 PROCEDURE — 99213 OFFICE O/P EST LOW 20 MIN: CPT | Performed by: NURSE PRACTITIONER

## 2024-03-18 RX ORDER — PRAMIPEXOLE DIHYDROCHLORIDE 0.12 MG/1
0.12 TABLET ORAL NIGHTLY
Qty: 90 TABLET | Refills: 1 | Status: SHIPPED | OUTPATIENT
Start: 2024-03-18

## 2024-03-18 NOTE — PROGRESS NOTES
Chief Complaint   Patient presents with    Weight Management     Med WM Adipex # 6         SUBJECTIVE:    HPI: Patient is here with complaints of: Monthly Adipex visit.    Obesity with a BMI of Body mass index is 37.01 kg/m²..    Current weight: 215 pounds     Weight change since last visit: -4.5 pounds (if pt failed to lose weight, cannot remain on medication).    Pt complains of the following side effects: DENIES    Current dietary measures: portion control, mindful eating, and healthier food options    Current exercise measures: some walking    I have reviewed the patient's(pertinent information to this visit) medical history, family history(scanned in  the [employer] under \"patient questioner\"), social history and review of systems with the patient today in the office.          Past Surgical History:   Procedure Laterality Date    ANKLE SURGERY Left 02/2004    \"have plate and pins still\"    COLONOSCOPY  2010?    DILATION AND CURETTAGE OF UTERUS  May 2012    TONSILLECTOMY      age 4    TOTAL KNEE ARTHROPLASTY Left 4/23/2019    KNEE TOTAL ARTHROPLASTY LEFT performed by Salvatore Medina DO at [facility] OR    WISDOM TOOTH EXTRACTION         Past Medical History:   Diagnosis Date    Anticoagulant long-term use     Arthritis     generalized OA    DVT of lower extremity (deep venous thrombosis) (HCC)     Left 2012    Fracture of left ankle 02/01/2004    GERD (gastroesophageal reflux disease)     H/O Doppler ultrasound 11/02/2014    Venous Doppler. Left lower extremity probably old DVT in the distal SFA and popliteal areas otherwise right lower extremity reveals normal findings.    H/O Doppler ultrasound 09/19/2017    LLE - chronic DVT, severe veous reflux    H/O echocardiogram 09/26/2016    EF60% mild MR, TR, mild pulm htn    H/O echocardiogram 07/18/2018    EF55-60% mild TR    Homocystinemia     Hx of blood clots     \"hx of PE- 2013 and had DVT left leg that went to my lung\"    Hx of Doppler echocardiogram 12/22/2017    EF

## 2024-06-13 ENCOUNTER — APPOINTMENT (OUTPATIENT)
Dept: CT IMAGING | Age: 74
End: 2024-06-13
Payer: COMMERCIAL

## 2024-06-13 ENCOUNTER — HOSPITAL ENCOUNTER (EMERGENCY)
Age: 74
Discharge: HOME | End: 2024-06-13
Attending: EMERGENCY MEDICINE
Payer: COMMERCIAL

## 2024-06-13 VITALS
SYSTOLIC BLOOD PRESSURE: 152 MMHG | DIASTOLIC BLOOD PRESSURE: 57 MMHG | WEIGHT: 200 LBS | RESPIRATION RATE: 16 BRPM | BODY MASS INDEX: 34.15 KG/M2 | HEART RATE: 89 BPM | TEMPERATURE: 98.1 F | OXYGEN SATURATION: 99 % | HEIGHT: 64 IN

## 2024-06-13 DIAGNOSIS — S09.90XA CLOSED HEAD INJURY, INITIAL ENCOUNTER: Primary | ICD-10-CM

## 2024-06-13 PROCEDURE — 99284 EMERGENCY DEPT VISIT MOD MDM: CPT

## 2024-06-13 PROCEDURE — 70450 CT HEAD/BRAIN W/O DYE: CPT

## 2024-06-13 ASSESSMENT — PAIN DESCRIPTION - DESCRIPTORS: DESCRIPTORS: TINGLING

## 2024-06-13 ASSESSMENT — PAIN - FUNCTIONAL ASSESSMENT
PAIN_FUNCTIONAL_ASSESSMENT: 0-10
PAIN_FUNCTIONAL_ASSESSMENT: ACTIVITIES ARE NOT PREVENTED

## 2024-06-13 ASSESSMENT — PAIN DESCRIPTION - PAIN TYPE: TYPE: ACUTE PAIN

## 2024-06-13 ASSESSMENT — PAIN DESCRIPTION - FREQUENCY: FREQUENCY: CONTINUOUS

## 2024-06-13 ASSESSMENT — PAIN DESCRIPTION - LOCATION: LOCATION: HEAD

## 2024-06-13 ASSESSMENT — PAIN SCALES - GENERAL: PAINLEVEL_OUTOF10: 4

## 2024-06-14 NOTE — ED PROVIDER NOTES
Triage Chief Complaint:   Head Injury (Pt was at a baseball game a ball came over the safety net and hit pt in the forehead no LOC. Pt is A&O x4.)    [place]:  Ayesha Martinez is a 73 y.o. female that presents with head injury.  Patient was at baseball game just this evening when he fell ball went over the net barrier and struck her in the forehead.  There is no loss of consciousness.  Patient is on Xarelto for anticoagulation.  Patient discussed her injury with her son-in-law who is an ER doctor in the area and he recommended being evaluated with CAT scan in the emergency department for further evaluation.  Patient at this time is complaining just mild pain to her upper forehead with associated swelling.    ROS:  General:  No fevers, no chills  ENT: No sore throat, no runny nose  Cardiovascular:  No chest pain, no palpitations  Respiratory:  No shortness of breath, no cough  Gastrointestinal:  No pain, no nausea, no vomiting, no diarrhea  : No pain with urinating, no increased frequency urinating  Neurologic:  No numbness, no weakness, + headache  Extremities:  No edema, no pain  Skin:  No rash  Psych: No axienty    Past Medical History:   Diagnosis Date    Anticoagulant long-term use     Arthritis     generalized OA    DVT of lower extremity (deep venous thrombosis) (HCC)     Left 2012    Fracture of left ankle 02/01/2004    GERD (gastroesophageal reflux disease)     H/O Doppler ultrasound 11/02/2014    Venous Doppler. Left lower extremity probably old DVT in the distal SFA and popliteal areas otherwise right lower extremity reveals normal findings.    H/O Doppler ultrasound 09/19/2017    LLE - chronic DVT, severe veous reflux    H/O echocardiogram 09/26/2016    EF60% mild MR, TR, mild pulm htn    H/O echocardiogram 07/18/2018    EF55-60% mild TR    Homocystinemia     Hx of blood clots     \"hx of PE- 2013 and had DVT left leg that went to my lung\"    Hx of Doppler echocardiogram 12/22/2017    EF 50-60% mild biatrial

## 2024-06-17 RX ORDER — MELOXICAM 7.5 MG/1
3.75 TABLET ORAL DAILY
Qty: 45 TABLET | Refills: 0 | Status: SHIPPED | OUTPATIENT
Start: 2024-06-17

## 2024-07-21 ENCOUNTER — APPOINTMENT (OUTPATIENT)
Dept: GENERAL RADIOLOGY | Age: 74
End: 2024-07-21
Payer: COMMERCIAL

## 2024-07-21 ENCOUNTER — HOSPITAL ENCOUNTER (EMERGENCY)
Age: 74
Discharge: HOME | End: 2024-07-21
Attending: EMERGENCY MEDICINE
Payer: COMMERCIAL

## 2024-07-21 ENCOUNTER — APPOINTMENT (OUTPATIENT)
Dept: CT IMAGING | Age: 74
End: 2024-07-21
Payer: COMMERCIAL

## 2024-07-21 VITALS
TEMPERATURE: 97.8 F | DIASTOLIC BLOOD PRESSURE: 86 MMHG | SYSTOLIC BLOOD PRESSURE: 136 MMHG | OXYGEN SATURATION: 98 % | WEIGHT: 196 LBS | BODY MASS INDEX: 33.46 KG/M2 | RESPIRATION RATE: 16 BRPM | HEART RATE: 74 BPM | HEIGHT: 64 IN
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
## 2024-07-21 DIAGNOSIS — M25.562 ACUTE PAIN OF LEFT KNEE: ICD-10-CM

## 2024-07-21 DIAGNOSIS — M79.18 MUSCULOSKELETAL PAIN: ICD-10-CM

## 2024-07-21 DIAGNOSIS — S09.90XA INJURY OF HEAD, INITIAL ENCOUNTER: Primary | ICD-10-CM

## 2024-07-21 DIAGNOSIS — M25.532 LEFT WRIST PAIN: ICD-10-CM

## 2024-07-21 PROCEDURE — 70450 CT HEAD/BRAIN W/O DYE: CPT

## 2024-07-21 PROCEDURE — 73110 X-RAY EXAM OF WRIST: CPT

## 2024-07-21 PROCEDURE — 73562 X-RAY EXAM OF KNEE 3: CPT

## 2024-07-21 PROCEDURE — 99284 EMERGENCY DEPT VISIT MOD MDM: CPT

## 2024-07-21 PROCEDURE — 73130 X-RAY EXAM OF HAND: CPT

## 2024-07-21 NOTE — ED PROVIDER NOTES
Pupils are equal, round, and reactive to light.   Cardiovascular:      Rate and Rhythm: Normal rate and regular rhythm.      Pulses: Normal pulses.      Heart sounds: Normal heart sounds.   Pulmonary:      Effort: Pulmonary effort is normal. No respiratory distress.      Breath sounds: Normal breath sounds. No stridor. No wheezing, rhonchi or rales.   Musculoskeletal:         General: Swelling, tenderness and signs of injury present.      Left elbow: Normal.      Left forearm: Normal.      Left wrist: Swelling and tenderness present. No lacerations or crepitus. Decreased range of motion. Normal pulse.      Left hand: Swelling and tenderness present. No lacerations. Decreased range of motion. Normal sensation. Normal pulse.      Cervical back: Full passive range of motion without pain and normal range of motion. No rigidity or tenderness. No spinous process tenderness or muscular tenderness.      Left knee: Swelling present. No erythema or lacerations. Decreased range of motion. Tenderness present.   Skin:     General: Skin is warm.      Coloration: Skin is not jaundiced or pale.      Findings: Bruising present. No erythema, lesion or rash.   Neurological:      General: No focal deficit present.      Mental Status: She is alert and oriented to person, place, and time.      Cranial Nerves: No cranial nerve deficit.      Sensory: No sensory deficit.      Motor: No weakness.      Coordination: Coordination normal.   Psychiatric:         Mood and Affect: Mood normal.         Behavior: Behavior normal. Behavior is cooperative.         Thought Content: Thought content normal.         Judgment: Judgment normal.           I have reviewed andinterpreted all of the currently available lab results from this visit (if applicable):    No results found for this visit on 07/21/24.     Radiographs (if obtained):  [] The following radiograph was interpreted by myself in the absence of a radiologist:  [x] Radiologist's Report

## 2024-07-21 NOTE — ED TRIAGE NOTES
Pt C/O fall. States that she tripped in the doorway. C/O injury to head, L knee, and L hand. Denies LOC. Patient does take Xarelto.

## 2024-07-22 ENCOUNTER — CARE COORDINATION (OUTPATIENT)
Dept: OTHER | Facility: CLINIC | Age: 74
End: 2024-07-22

## 2024-07-22 NOTE — CARE COORDINATION
ED Follow Up Call    2024    Patient: Ayesha Martinez Patient : 1950   MRN: [id number]  Reason for Admission: Fall, head injury  Discharge Date: 24   Pt doing fine today she said. Her fall was isolated, she went to the ED mainly because she is on xarelto and was concerned about bleeding. Said her knee is still a little swollen but is ok. She denies any needs or questions today, Pt did appreciate the call today. ACM will sign off at this time         Care Transitions ED Follow Up    Care Transitions Interventions  Do you have any ongoing symptoms?: No   Did you call your PCP prior to going to the ED?: No - Did not call PCP   Do you have a copy of your discharge instructions?: Yes   Do you understand what to report and when to return?: Yes   Are you following your discharge instructions?: Yes   Do you have all of your prescriptions and are they filled?: Yes   Have you scheduled your follow up appointment?: No   Were you discharged with any Home Care or Post Acute Services or do you currently have any active services?: No   Post Acute Services: Home Health (Comment: [facility] PT; Discharged from [facility] as of 19)         Do you have any needs or concerns that I can assist you with?: No   Identified Barriers: None             Kimberli UMAÑA, RN- [facility]  Associate Care Manager  [phone]  [email or web address]

## 2024-07-29 ENCOUNTER — OFFICE VISIT (OUTPATIENT)
Dept: ORTHOPEDIC SURGERY | Age: 74
End: 2024-07-29
Payer: COMMERCIAL

## 2024-07-29 VITALS — HEART RATE: 64 BPM | TEMPERATURE: 97.5 F | OXYGEN SATURATION: 100 %

## 2024-07-29 DIAGNOSIS — S80.02XA CONTUSION OF LEFT KNEE, INITIAL ENCOUNTER: Primary | ICD-10-CM

## 2024-07-29 PROCEDURE — 99213 OFFICE O/P EST LOW 20 MIN: CPT | Performed by: PHYSICIAN ASSISTANT

## 2024-07-29 PROCEDURE — 1123F ACP DISCUSS/DSCN MKR DOCD: CPT | Performed by: PHYSICIAN ASSISTANT

## 2024-07-29 RX ORDER — CEPHALEXIN 500 MG/1
500 CAPSULE ORAL 4 TIMES DAILY
Qty: 28 CAPSULE | Refills: 0 | Status: SHIPPED | OUTPATIENT
Start: 2024-07-29 | End: 2024-08-05

## 2024-07-29 ASSESSMENT — ENCOUNTER SYMPTOMS
GASTROINTESTINAL NEGATIVE: 1
RESPIRATORY NEGATIVE: 1
EYES NEGATIVE: 1

## 2024-07-29 NOTE — PROGRESS NOTES
Patient seen in office today for: FU ED  Pain in left knee    Had a fall and bruising and knot on head her left side redness and swelling xray done at ED   She is concerned about why its still feeling and looking as its been 2 weeks or so since the fall she's asks if MRI might be needed   Patient reports 1/10 pain.sitting - and moving around   RICE and medication are effective to alleviate pain and reduce swelling.   Pain also alleviated by: OTC med as needed   Pain worsened by: Patient reports painful ROM & weight bearing.     EXAMINATION: XR KNEE LEFT (3 VIEWS), 7/21/2024 6:41 PM     HISTORY: pain     COMPARISON:  No comparisons available.          Findings:  No acute fracture or malalignment.     Knee prosthesis intact  Soft tissues unremarkable.              IMPRESSION:  No acute fracture or malalignment.

## 2024-07-29 NOTE — PATIENT INSTRUCTIONS
Apply a compressive ACE bandage. Rest and elevate the affected painful area.  Apply cold compresses intermittently as needed.  As pain recedes, begin normal activities slowly as tolerated.  Call if symptoms persist.  Follow up as needed

## 2024-07-29 NOTE — PROGRESS NOTES
Review of Systems   Constitutional: Negative.    HENT: Negative.     Eyes: Negative.    Respiratory: Negative.     Cardiovascular: Negative.    Gastrointestinal: Negative.    Genitourinary: Negative.    Musculoskeletal:  Positive for arthralgias and joint swelling.   Skin: Negative.    Neurological: Negative.    Psychiatric/Behavioral: Negative.           HPI:  Ayesha Martinez is a 74 y.o. who has a history of a left total knee replacement done years ago and recently had a fall and she is coming in today to make sure nothing happened to the knee replacement.  She has pain and swelling in the left knee as well as some bruising.  She did have x-rays which were read as negative.  She states that she is on Xarelto which makes her bleed a lot easier.  She wanted to make sure that she did not tear a ligament in the knee.    Past Medical History:   Diagnosis Date    Anticoagulant long-term use     Arthritis     generalized OA    DVT of lower extremity (deep venous thrombosis) (HCC)     Left 2012    Fracture of left ankle 02/01/2004    GERD (gastroesophageal reflux disease)     H/O Doppler ultrasound 11/02/2014    Venous Doppler. Left lower extremity probably old DVT in the distal SFA and popliteal areas otherwise right lower extremity reveals normal findings.    H/O Doppler ultrasound 09/19/2017    LLE - chronic DVT, severe veous reflux    H/O echocardiogram 09/26/2016    EF60% mild MR, TR, mild pulm htn    H/O echocardiogram 07/18/2018    EF55-60% mild TR    Homocystinemia     Hx of blood clots     \"hx of PE- 2013 and had DVT left leg that went to my lung\"    Hx of Doppler echocardiogram 12/22/2017    EF 50-60% mild biatrial enlargement, mild to mod TR, mildly elevated pulmonary artery pressure.    Hyperlipidemia     Hypertension, essential     Left leg swelling     wears compression stocking    Left Lower Extremity Venous Doppler ultrasound 09/16/2021    Evidence of partially occlusive chronic DVT in the left proximal
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
## 2024-07-31 DIAGNOSIS — M79.89 LEFT LEG SWELLING: Primary | ICD-10-CM

## 2024-08-02 ENCOUNTER — HOSPITAL ENCOUNTER (OUTPATIENT)
Age: 74
Discharge: HOME | End: 2024-08-02
Payer: COMMERCIAL

## 2024-08-02 ENCOUNTER — HOSPITAL ENCOUNTER (OUTPATIENT)
Dept: GENERAL RADIOLOGY | Age: 74
Discharge: HOME | End: 2024-08-02
Payer: COMMERCIAL

## 2024-08-02 DIAGNOSIS — M25.572 LEFT ANKLE PAIN, UNSPECIFIED CHRONICITY: ICD-10-CM

## 2024-08-02 DIAGNOSIS — M25.572 LEFT ANKLE PAIN, UNSPECIFIED CHRONICITY: Primary | ICD-10-CM

## 2024-08-02 PROCEDURE — 73610 X-RAY EXAM OF ANKLE: CPT

## 2024-08-09 ENCOUNTER — OFFICE VISIT (OUTPATIENT)
Dept: ORTHOPEDIC SURGERY | Age: 74
End: 2024-08-09
Payer: COMMERCIAL

## 2024-08-09 VITALS — OXYGEN SATURATION: 98 % | RESPIRATION RATE: 17 BRPM | TEMPERATURE: 97.6 F | HEART RATE: 90 BPM

## 2024-08-09 DIAGNOSIS — M25.572 PAIN AND SWELLING OF LEFT ANKLE: Primary | ICD-10-CM

## 2024-08-09 DIAGNOSIS — M25.472 PAIN AND SWELLING OF LEFT ANKLE: Primary | ICD-10-CM

## 2024-08-09 PROCEDURE — 1123F ACP DISCUSS/DSCN MKR DOCD: CPT | Performed by: PHYSICIAN ASSISTANT

## 2024-08-09 PROCEDURE — 99213 OFFICE O/P EST LOW 20 MIN: CPT | Performed by: PHYSICIAN ASSISTANT

## 2024-08-09 RX ORDER — PREDNISONE 20 MG/1
20 TABLET ORAL 2 TIMES DAILY
Qty: 14 TABLET | Refills: 0 | Status: SHIPPED | OUTPATIENT
Start: 2024-08-09 | End: 2024-08-16

## 2024-08-09 NOTE — PROGRESS NOTES
Patient seen in office today for: Left ankle pain after a fall on 7/21/2024, we have been treating left knee r/t the same injury.    DOI: 7/21/2024    Patient reports 3/10 pain.  RICE and medication are effective to alleviate pain and reduce swelling. Pain worsened by: Patient reports painful ROM & weight bearing.     Dr Francis ordered xrays of the left ankle and they were performed 8/2/2024.

## 2024-08-09 NOTE — PATIENT INSTRUCTIONS
Continue weight-bearing as tolerated.  Continue range of motion exercises as instructed.  Ice and elevate as needed.  Tylenol or Motrin for pain.  Start prednisone  Follow up as needed     We are committed to providing you the best care possible.     If you receive a survey after visiting one of our offices, please take time to share your experience concerning your physician office visit.  These surveys are confidential and no health information about you is shared.     We are eager to improve for you and we are counting on your feedback to help make that happen. If you felt my care was outstanding, please mention me by name: Cony HARRISON

## 2024-08-12 RX ORDER — LOSARTAN POTASSIUM AND HYDROCHLOROTHIAZIDE 25; 100 MG/1; MG/1
1 TABLET ORAL DAILY
Qty: 90 TABLET | Refills: 0 | Status: SHIPPED | OUTPATIENT
Start: 2024-08-12

## 2024-08-12 ASSESSMENT — ENCOUNTER SYMPTOMS
EYES NEGATIVE: 1
GASTROINTESTINAL NEGATIVE: 1
RESPIRATORY NEGATIVE: 1

## 2024-08-12 NOTE — PROGRESS NOTES
Review of Systems   Constitutional: Negative.    HENT: Negative.     Eyes: Negative.    Respiratory: Negative.     Cardiovascular: Negative.    Gastrointestinal: Negative.    Genitourinary: Negative.    Musculoskeletal:  Positive for arthralgias and joint swelling.   Skin: Negative.    Neurological: Negative.    Psychiatric/Behavioral: Negative.           Previous HPI:  Ayesha Matrinez is a 74 y.o. who has a history of a left total knee replacement done years ago and recently had a fall and she is coming in today to make sure nothing happened to the knee replacement.  She has pain and swelling in the left knee as well as some bruising.  She did have x-rays which were read as negative.  She states that she is on Xarelto which makes her bleed a lot easier.  She wanted to make sure that she did not tear a ligament in the knee.    Current HPI: Ayesha Martinez is a 74-year-old female who is following up today after her fall that I saw her for several weeks ago.  She states that she did take the antibiotic and really did not notice much difference with anything regarding the swelling or redness in the leg.  She is not having much knee pain today but she is coming to talk about her left ankle which has swelling and has some pain over a previous scar from ankle surgery due to a fracture.  She states that at 1 point after the ankle surgery she had an area of the incision that opened up \"a metal BB like object came out of the incision\" and then after that the incision closed up.  Other than that she has not had any significant issues with the ankle fracture surgery.        Past Medical History:   Diagnosis Date    Anticoagulant long-term use     Arthritis     generalized OA    DVT of lower extremity (deep venous thrombosis) (HCC)     Left 2012    Fracture of left ankle 02/01/2004    GERD (gastroesophageal reflux disease)     H/O Doppler ultrasound 11/02/2014    Venous Doppler. Left lower extremity probably old DVT in the distal

## 2024-08-20 RX ORDER — PRAMIPEXOLE DIHYDROCHLORIDE 0.12 MG/1
0.12 TABLET ORAL NIGHTLY
Qty: 90 TABLET | Refills: 1 | Status: SHIPPED | OUTPATIENT
Start: 2024-08-20

## 2024-09-09 RX ORDER — ASPIRIN 81 MG/1
81 TABLET ORAL DAILY
Qty: 384 TABLET | Refills: 0 | OUTPATIENT
Start: 2024-09-09

## 2024-09-09 RX ORDER — PRAVASTATIN SODIUM 10 MG
10 TABLET ORAL DAILY
Qty: 90 TABLET | Refills: 1 | Status: SHIPPED | OUTPATIENT
Start: 2024-09-09 | End: 2025-03-08

## 2024-09-09 RX ORDER — FOLIC ACID 1 MG/1
1 TABLET ORAL DAILY
Qty: 90 TABLET | Refills: 1 | Status: SHIPPED | OUTPATIENT
Start: 2024-09-09

## 2024-09-09 RX ORDER — MELOXICAM 7.5 MG/1
3.75 TABLET ORAL DAILY
Qty: 45 TABLET | Refills: 0 | Status: SHIPPED | OUTPATIENT
Start: 2024-09-09

## 2024-09-13 ENCOUNTER — OFFICE VISIT (OUTPATIENT)
Dept: FAMILY MEDICINE CLINIC | Age: 74
End: 2024-09-13
Payer: COMMERCIAL

## 2024-09-13 VITALS
HEIGHT: 64 IN | RESPIRATION RATE: 16 BRPM | OXYGEN SATURATION: 98 % | BODY MASS INDEX: 33.97 KG/M2 | HEART RATE: 68 BPM | DIASTOLIC BLOOD PRESSURE: 68 MMHG | WEIGHT: 199 LBS | SYSTOLIC BLOOD PRESSURE: 108 MMHG

## 2024-09-13 DIAGNOSIS — I10 HYPERTENSION, ESSENTIAL: ICD-10-CM

## 2024-09-13 DIAGNOSIS — M15.9 GENERALIZED OA: Chronic | ICD-10-CM

## 2024-09-13 DIAGNOSIS — I83.893 VARICOSE VEINS OF LOWER EXTREMITIES WITH COMPLICATIONS, BILATERAL: ICD-10-CM

## 2024-09-13 DIAGNOSIS — Z00.00 WELL ADULT HEALTH CHECK: Primary | ICD-10-CM

## 2024-09-13 DIAGNOSIS — E78.5 HYPERLIPIDEMIA, UNSPECIFIED HYPERLIPIDEMIA TYPE: ICD-10-CM

## 2024-09-13 DIAGNOSIS — G25.81 RLS (RESTLESS LEGS SYNDROME): ICD-10-CM

## 2024-09-13 PROCEDURE — 3078F DIAST BP <80 MM HG: CPT | Performed by: FAMILY MEDICINE

## 2024-09-13 PROCEDURE — 3074F SYST BP LT 130 MM HG: CPT | Performed by: FAMILY MEDICINE

## 2024-09-13 PROCEDURE — 99397 PER PM REEVAL EST PAT 65+ YR: CPT | Performed by: FAMILY MEDICINE

## 2024-09-13 SDOH — ECONOMIC STABILITY: FOOD INSECURITY: WITHIN THE PAST 12 MONTHS, YOU WORRIED THAT YOUR FOOD WOULD RUN OUT BEFORE YOU GOT MONEY TO BUY MORE.: NEVER TRUE

## 2024-09-13 SDOH — ECONOMIC STABILITY: FOOD INSECURITY: WITHIN THE PAST 12 MONTHS, THE FOOD YOU BOUGHT JUST DIDN'T LAST AND YOU DIDN'T HAVE MONEY TO GET MORE.: NEVER TRUE

## 2024-09-13 SDOH — ECONOMIC STABILITY: INCOME INSECURITY: HOW HARD IS IT FOR YOU TO PAY FOR THE VERY BASICS LIKE FOOD, HOUSING, MEDICAL CARE, AND HEATING?: NOT HARD AT ALL

## 2024-09-13 ASSESSMENT — ENCOUNTER SYMPTOMS
VOMITING: 0
NAUSEA: 0
EYE PAIN: 0
CHEST TIGHTNESS: 0
ABDOMINAL PAIN: 0
TROUBLE SWALLOWING: 0
DIARRHEA: 0
WHEEZING: 0
BLOOD IN STOOL: 0
SHORTNESS OF BREATH: 0
BACK PAIN: 1

## 2024-11-06 ENCOUNTER — OFFICE VISIT (OUTPATIENT)
Dept: FAMILY MEDICINE CLINIC | Age: 74
End: 2024-11-06
Payer: COMMERCIAL

## 2024-11-06 VITALS
TEMPERATURE: 97.1 F | HEART RATE: 60 BPM | WEIGHT: 202 LBS | BODY MASS INDEX: 34.49 KG/M2 | OXYGEN SATURATION: 96 % | DIASTOLIC BLOOD PRESSURE: 84 MMHG | HEIGHT: 64 IN | SYSTOLIC BLOOD PRESSURE: 142 MMHG

## 2024-11-06 DIAGNOSIS — R22.1 THROAT SWELLING: Primary | ICD-10-CM

## 2024-11-06 DIAGNOSIS — R13.10 DYSPHAGIA, UNSPECIFIED TYPE: ICD-10-CM

## 2024-11-06 LAB — STREPTOCOCCUS A RNA: NEGATIVE

## 2024-11-06 PROCEDURE — 1123F ACP DISCUSS/DSCN MKR DOCD: CPT | Performed by: PHYSICIAN ASSISTANT

## 2024-11-06 PROCEDURE — 87651 STREP A DNA AMP PROBE: CPT | Performed by: PHYSICIAN ASSISTANT

## 2024-11-06 PROCEDURE — 3077F SYST BP >= 140 MM HG: CPT | Performed by: PHYSICIAN ASSISTANT

## 2024-11-06 PROCEDURE — 3079F DIAST BP 80-89 MM HG: CPT | Performed by: PHYSICIAN ASSISTANT

## 2024-11-06 PROCEDURE — 99213 OFFICE O/P EST LOW 20 MIN: CPT | Performed by: PHYSICIAN ASSISTANT

## 2024-11-06 RX ORDER — OMEPRAZOLE 40 MG/1
40 CAPSULE, DELAYED RELEASE ORAL
Qty: 30 CAPSULE | Refills: 2 | Status: SHIPPED | OUTPATIENT
Start: 2024-11-06

## 2024-11-06 RX ORDER — FAMOTIDINE 20 MG/1
20 TABLET, FILM COATED ORAL 2 TIMES DAILY
Qty: 60 TABLET | Refills: 2 | Status: SHIPPED | OUTPATIENT
Start: 2024-11-06

## 2024-11-06 RX ORDER — METHYLPREDNISOLONE 4 MG/1
TABLET ORAL
Qty: 1 KIT | Refills: 0 | Status: SHIPPED | OUTPATIENT
Start: 2024-11-06

## 2024-11-06 NOTE — PROGRESS NOTES
11/6/2024    Ayesha Martinez    Chief Complaint   Patient presents with    Pharyngitis     - headache, pain behind right ear, denies nasal sx's, voice is hoarse, denies sore throat, is having sensation throat is swollen, denies cough, denies chest sx's, discomfort in throat while taking a deep breathe, denies body aches or chilling, denies gi sx's. Sx's started 11/2/24. Tried tylenol without success.        HPI  History was obtained from patient.  Ayesha is a 74 y.o. female who presents today with concerns that her throat may be swollen.  She is having some difficulty swallowing.  States when she swallows liquids, it seems to bubble and then causes her to choke.  It is like the liquid will not go down.  Her voice has been hoarse.  She has had some throat clearing.  She denies any sore throat but has had some pain behind the right ear at times.  She denies nasal congestion, runny nose, postnasal drip, cough, fevers.  She denies stridor or wheezing.  She denies any history of GERD but I do see it on her problem list.  She is not on any PPI or H2 blockers.        PAST MEDICAL HISTORY  Past Medical History:   Diagnosis Date    Anticoagulant long-term use     Arthritis     generalized OA    DVT of lower extremity (deep venous thrombosis) (HCC)     Left 2012    Fracture of left ankle 02/01/2004    GERD (gastroesophageal reflux disease)     H/O Doppler ultrasound 11/02/2014    Venous Doppler. Left lower extremity probably old DVT in the distal SFA and popliteal areas otherwise right lower extremity reveals normal findings.    H/O Doppler ultrasound 09/19/2017    LLE - chronic DVT, severe veous reflux    H/O echocardiogram 09/26/2016    EF60% mild MR, TR, mild pulm htn    H/O echocardiogram 07/18/2018    EF55-60% mild TR    Homocystinemia     Hx of blood clots     \"hx of PE- 2013 and had DVT left leg that went to my lung\"    Hx of Doppler echocardiogram 12/22/2017    EF 50-60% mild biatrial enlargement, mild to mod TR, mildly

## 2024-11-06 NOTE — PATIENT INSTRUCTIONS
Rapid strep test negative  Start Medrol Dosepak  Start Pepcid and omeprazole  Keep a food diary and avoid any known triggers  Elevate head of bed at night  If symptoms persist, would recommend EGD

## 2024-11-07 ENCOUNTER — HOSPITAL ENCOUNTER (OUTPATIENT)
Dept: WOMENS IMAGING | Age: 74
Discharge: HOME | End: 2024-11-07
Payer: COMMERCIAL

## 2024-11-07 DIAGNOSIS — Z12.31 ENCOUNTER FOR SCREENING MAMMOGRAM FOR BREAST CANCER: ICD-10-CM

## 2024-11-07 PROCEDURE — 77063 BREAST TOMOSYNTHESIS BI: CPT

## 2024-11-26 DIAGNOSIS — R13.10 DYSPHAGIA, UNSPECIFIED TYPE: Primary | ICD-10-CM

## 2024-12-02 RX ORDER — LOSARTAN POTASSIUM AND HYDROCHLOROTHIAZIDE 25; 100 MG/1; MG/1
1 TABLET ORAL DAILY
Qty: 90 TABLET | Refills: 0 | OUTPATIENT
Start: 2024-12-02

## 2024-12-02 RX ORDER — MELOXICAM 7.5 MG/1
3.75 TABLET ORAL DAILY
Qty: 45 TABLET | Refills: 0 | Status: SHIPPED | OUTPATIENT
Start: 2024-12-02

## 2024-12-02 RX ORDER — MELOXICAM 7.5 MG/1
TABLET ORAL
Qty: 45 TABLET | Refills: 0 | OUTPATIENT
Start: 2024-12-02

## 2024-12-02 RX ORDER — LOSARTAN POTASSIUM AND HYDROCHLOROTHIAZIDE 25; 100 MG/1; MG/1
0.5 TABLET ORAL DAILY
Qty: 45 TABLET | Refills: 1 | Status: SHIPPED | OUTPATIENT
Start: 2024-12-02

## 2024-12-16 ENCOUNTER — HOSPITAL ENCOUNTER (OUTPATIENT)
Dept: SPEECH THERAPY | Age: 74
Setting detail: THERAPIES SERIES
Discharge: HOME | End: 2024-12-16
Payer: COMMERCIAL

## 2024-12-16 ENCOUNTER — TELEPHONE (OUTPATIENT)
Dept: FAMILY MEDICINE CLINIC | Age: 74
End: 2024-12-16

## 2024-12-16 DIAGNOSIS — R13.10 DYSPHAGIA, UNSPECIFIED TYPE: Primary | ICD-10-CM

## 2024-12-16 PROCEDURE — 92610 EVALUATE SWALLOWING FUNCTION: CPT

## 2024-12-16 NOTE — PROGRESS NOTES
concerns. Results/recommendations d/w pt, who verbalized understanding and agreement. No further outpatient SLP needs ID at this time.     Past Medical History:  has a past medical history of Anticoagulant long-term use, Arthritis, DVT of lower extremity (deep venous thrombosis) (HCC), Fracture of left ankle, GERD (gastroesophageal reflux disease), H/O Doppler ultrasound, H/O Doppler ultrasound, H/O echocardiogram, H/O echocardiogram, Homocystinemia, Hx of blood clots, Hx of Doppler echocardiogram, Hyperlipidemia, Hypertension, essential, Left leg swelling, Left Lower Extremity Venous Doppler ultrasound, Non-alcoholic fatty liver disease, Obesity, Osteoarthritis, Other chest pain, Postmenopausal bleeding, Pulmonary embolism (HCC), Spinal stenosis, Varicose veins of lower extremity, and Wears glasses.  Past Surgical History:  has a past surgical history that includes Ankle surgery (Left, 02/2004); Dilation and curettage of uterus (May 2012); Colonoscopy (2010?); Tonsillectomy; [place] tooth extraction; and Total knee arthroplasty (Left, 4/23/2019).    Date of Eval: 12/16/2024  Evaluating Therapist: Lacho Hightower MS, CCC-SLP    Current Diet level:  Current Diet : Regular  Current Liquid Diet : Thin    Primary Complaint:   Difficulty with pills and dry solids, occasional regurgitation    Pain: 0; denies     Reason for Referral  Ayesha Martinez was referred for a bedside swallow evaluation to assess the efficiency of her swallow function, identify signs and symptoms of aspiration, and make recommendations regarding safe dietary consistencies, effective compensatory strategies, and safe eating environment.    Impression  Dysphagia Diagnosis: Swallow function appears WFL;Concerns for esophageal stage dysphagia  Dysphagia Outcome Severity Scale: Level 6: Within functional limits/Modified independence    Treatment Plan  Requires SLP Intervention: No  Referral To: GI  Duration of Treatment: N/A  Frequency of Treatment: N/A

## 2024-12-26 ENCOUNTER — TELEPHONE (OUTPATIENT)
Dept: GASTROENTEROLOGY | Age: 74
End: 2024-12-26

## 2024-12-26 NOTE — TELEPHONE ENCOUNTER
Called pt. In regards to a referral for dysphagia. Made appt for pt to see dr. Steele on 1/15/25 @3:50pm

## 2025-01-02 ENCOUNTER — OFFICE VISIT (OUTPATIENT)
Dept: ORTHOPEDIC SURGERY | Age: 75
End: 2025-01-02
Payer: COMMERCIAL

## 2025-01-02 VITALS — HEART RATE: 67 BPM | RESPIRATION RATE: 14 BRPM | OXYGEN SATURATION: 98 %

## 2025-01-02 DIAGNOSIS — M70.62 TROCHANTERIC BURSITIS OF LEFT HIP: Primary | ICD-10-CM

## 2025-01-02 PROCEDURE — 1123F ACP DISCUSS/DSCN MKR DOCD: CPT | Performed by: PHYSICIAN ASSISTANT

## 2025-01-02 PROCEDURE — 20610 DRAIN/INJ JOINT/BURSA W/O US: CPT | Performed by: PHYSICIAN ASSISTANT

## 2025-01-02 PROCEDURE — 99213 OFFICE O/P EST LOW 20 MIN: CPT | Performed by: PHYSICIAN ASSISTANT

## 2025-01-02 RX ORDER — TRIAMCINOLONE ACETONIDE 40 MG/ML
80 INJECTION, SUSPENSION INTRA-ARTICULAR; INTRAMUSCULAR ONCE
Status: COMPLETED | OUTPATIENT
Start: 2025-01-02 | End: 2025-01-02

## 2025-01-02 RX ADMIN — TRIAMCINOLONE ACETONIDE 80 MG: 40 INJECTION, SUSPENSION INTRA-ARTICULAR; INTRAMUSCULAR at 08:25

## 2025-01-02 ASSESSMENT — ENCOUNTER SYMPTOMS
RESPIRATORY NEGATIVE: 1
EYES NEGATIVE: 1
GASTROINTESTINAL NEGATIVE: 1
BACK PAIN: 1

## 2025-01-02 NOTE — PROGRESS NOTES
Review of Systems   Constitutional: Negative.    HENT: Negative.     Eyes: Negative.    Respiratory: Negative.     Cardiovascular: Negative.    Gastrointestinal: Negative.    Genitourinary: Negative.    Musculoskeletal:  Positive for arthralgias, back pain and myalgias.   Skin: Negative.  Negative for rash and wound.   Neurological: Negative.    Psychiatric/Behavioral: Negative.           HPI:Ayesha Martinez is a 74 y.o. female who is well-known to this office.  She is coming in today because of left hip pain that she has been having for several months.  She has been seen in this office for right hip pain in the past but not for her left.  No recent falls.  She does have a history of left total knee done in 2019.  She states that she has lateral aspect of the left hip.  She has a history of trochanteric bursitis on the right side and thinks that this is very similar.    Past Medical History:   Diagnosis Date    Anticoagulant long-term use     Arthritis     generalized OA    DVT of lower extremity (deep venous thrombosis) ([facility])     Left 2012    Fracture of left ankle 02/01/2004    GERD (gastroesophageal reflux disease)     H/O Doppler ultrasound 11/02/2014    Venous Doppler. Left lower extremity probably old DVT in the distal SFA and popliteal areas otherwise right lower extremity reveals normal findings.    H/O Doppler ultrasound 09/19/2017    LLE - chronic DVT, severe veous reflux    H/O echocardiogram 09/26/2016    EF60% mild MR, TR, mild pulm htn    H/O echocardiogram 07/18/2018    EF55-60% mild TR    Homocystinemia     Hx of blood clots     \"hx of PE- 2013 and had DVT left leg that went to my lung\"    Hx of Doppler echocardiogram 12/22/2017    EF 50-60% mild biatrial enlargement, mild to mod TR, mildly elevated pulmonary artery pressure.    Hyperlipidemia     Hypertension, essential     Left leg swelling     wears compression stocking    Left Lower Extremity Venous Doppler ultrasound 09/16/2021    Evidence of

## 2025-01-02 NOTE — PATIENT INSTRUCTIONS
Continue weight-bearing as tolerated.  Continue range of motion exercises as instructed.  Ice and elevate as needed.  Tylenol or Motrin for pain.  Injection given into the left hip.  Follow up as needed.    We are committed to providing you the best care possible.  If you receive a survey after visiting one of our offices, please take time to share your experience concerning your physician office visit.  These surveys are confidential and no health information about you is shared.  We are eager to improve for you and we are counting on your feedback to help make that happen.

## 2025-01-11 DIAGNOSIS — R22.1 THROAT SWELLING: ICD-10-CM

## 2025-01-11 DIAGNOSIS — R13.10 DYSPHAGIA, UNSPECIFIED TYPE: ICD-10-CM

## 2025-01-13 RX ORDER — FAMOTIDINE 20 MG/1
20 TABLET, FILM COATED ORAL 2 TIMES DAILY
Qty: 60 TABLET | Refills: 2 | Status: SHIPPED | OUTPATIENT
Start: 2025-01-13 | End: 2025-01-15

## 2025-01-13 RX ORDER — OMEPRAZOLE 40 MG/1
40 CAPSULE, DELAYED RELEASE ORAL
Qty: 30 CAPSULE | Refills: 2 | Status: SHIPPED | OUTPATIENT
Start: 2025-01-13

## 2025-01-15 ENCOUNTER — OFFICE VISIT (OUTPATIENT)
Dept: GASTROENTEROLOGY | Age: 75
End: 2025-01-15
Payer: COMMERCIAL

## 2025-01-15 ENCOUNTER — PREP FOR PROCEDURE (OUTPATIENT)
Dept: GASTROENTEROLOGY | Age: 75
End: 2025-01-15

## 2025-01-15 VITALS
WEIGHT: 206.2 LBS | HEART RATE: 81 BPM | HEIGHT: 64 IN | OXYGEN SATURATION: 98 % | SYSTOLIC BLOOD PRESSURE: 134 MMHG | RESPIRATION RATE: 16 BRPM | BODY MASS INDEX: 35.2 KG/M2 | DIASTOLIC BLOOD PRESSURE: 76 MMHG

## 2025-01-15 DIAGNOSIS — K21.9 GASTROESOPHAGEAL REFLUX DISEASE, UNSPECIFIED WHETHER ESOPHAGITIS PRESENT: ICD-10-CM

## 2025-01-15 DIAGNOSIS — Z12.11 COLON CANCER SCREENING: ICD-10-CM

## 2025-01-15 DIAGNOSIS — R13.19 ESOPHAGEAL DYSPHAGIA: Primary | ICD-10-CM

## 2025-01-15 DIAGNOSIS — R13.19 ESOPHAGEAL DYSPHAGIA: ICD-10-CM

## 2025-01-15 PROCEDURE — 99204 OFFICE O/P NEW MOD 45 MIN: CPT | Performed by: INTERNAL MEDICINE

## 2025-01-15 PROCEDURE — 1123F ACP DISCUSS/DSCN MKR DOCD: CPT | Performed by: INTERNAL MEDICINE

## 2025-01-15 PROCEDURE — 3078F DIAST BP <80 MM HG: CPT | Performed by: INTERNAL MEDICINE

## 2025-01-15 PROCEDURE — 3075F SYST BP GE 130 - 139MM HG: CPT | Performed by: INTERNAL MEDICINE

## 2025-01-15 NOTE — PROGRESS NOTES
[facility]             MD Rodolfo Mg MD Carol Christensen, APRN-CNP       Deonna Ferny, APRN-CNP             [address] [place] [address] [phone] fax [phone]        Gastroenterology Clinic Consultation    Poornima Steele MD  Encounter Date: 01/15/25     CC: New Patient (Pt is here to discuss dysphagia- mostly solids not liquids )       Damion Francis MD  [address] [place] [id number]     History obtained from: patient, family, medical records     Subjective:       Ayesha Martinez is an 74 y.o. female with past medical history of pulmonary embolism/DVT on Xarelto, GERD, nonalcoholic fatty liver disease, OA who presents for New Patient (Pt is here to discuss dysphagia- mostly solids not liquids )  .     According to the patient she has been having difficulty swallowing food.  Her dysphagia is mostly limited to solids without any liquids.  She mentions it started in Nov 2024 and has been persistent. No nausea or vomiting.     No melena or hematochezia.  She also mentions that she was told she had fatty liver disease.  On review of records her last LFTs done in March 2024 were noted to be unremarkable.    Colonoscopy initially in 2010 per patient   Recent cologuard in 03/2023 negative.     Patient Active Problem List   Diagnosis    Pulmonary embolism (HCC)    Generalized OA    Increased homocysteine    Obesity    DVT of leg (deep venous thrombosis)-L    SOBOE (shortness of breath on exertion)    Varicose veins of lower extremities with complications, bilateral    Left leg swelling    Other chest pain    History of total left knee replacement    Hypertension, essential    Hyperlipidemia    Osteoarthritis    DVT of lower extremity (deep venous thrombosis) (HCC)    Homocystinemia    Varicose veins of lower extremity    GERD (gastroesophageal reflux disease)

## 2025-01-15 NOTE — H&P (VIEW-ONLY)
[facility]             MD Rodolfo Mg MD Carol Christensen, APRN-CNP       Deonna Ferny, APRN-CNP             [address] [place] [address] [phone] fax [phone]        Gastroenterology Clinic Consultation    Poornima Steele MD  Encounter Date: 01/15/25     CC: New Patient (Pt is here to discuss dysphagia- mostly solids not liquids )       Damion Francis MD  [address] [place] [id number]     History obtained from: patient, family, medical records     Subjective:       Ayesha Martinez is an 74 y.o. female with past medical history of pulmonary embolism/DVT on Xarelto, GERD, nonalcoholic fatty liver disease, OA who presents for New Patient (Pt is here to discuss dysphagia- mostly solids not liquids )  .     According to the patient she has been having difficulty swallowing food.  Her dysphagia is mostly limited to solids without any liquids.  She mentions it started in Nov 2024 and has been persistent. No nausea or vomiting.     No melena or hematochezia.  She also mentions that she was told she had fatty liver disease.  On review of records her last LFTs done in March 2024 were noted to be unremarkable.    Colonoscopy initially in 2010 per patient   Recent cologuard in 03/2023 negative.     Patient Active Problem List   Diagnosis    Pulmonary embolism (HCC)    Generalized OA    Increased homocysteine    Obesity    DVT of leg (deep venous thrombosis)-L    SOBOE (shortness of breath on exertion)    Varicose veins of lower extremities with complications, bilateral    Left leg swelling    Other chest pain    History of total left knee replacement    Hypertension, essential    Hyperlipidemia    Osteoarthritis    DVT of lower extremity (deep venous thrombosis) (HCC)    Homocystinemia    Varicose veins of lower extremity    GERD (gastroesophageal reflux disease)

## 2025-01-17 NOTE — PROGRESS NOTES
Patient will be called with an arrival time on 1/24/2025 for her procedure at [facility] on 1/27/2025.    NOTHING TO EAT OR DRINK AFTER MIDNIGHT DAY OF SURGERY    1. Enter thru the hospital main entrance on day of surgery, check in at the Information Desk. If you arrive prior to 6:00am, enter thru the ER entrance.    2. Follow the directions as prescribed by the doctor for your procedure and medications.         Morning of surgery take:prilosec.         Stop vitamins, supplements and NSAIDS:  xarelto last dose 1/24/2025.    3. Check with your Doctor regarding stopping blood thinners and follow their instructions.    4. Do not smoke, vape or use chewing tobacco morning of surgery. Do not drink any alcoholic beverages 24 hours prior to surgery.       This includes NA Beer. No street drugs 7 days prior to surgery.    5. If you have dentures, contacts of glasses they will be removed before going to the OR; please bring a case.    6. Please bring picture ID, insurance card, paperwork from the doctor’s office (H & P, Consent, & card for implantable devices).    7. Take a shower with an antibacterial soap the night before surgery and the morning of surgery. Do not put anything on your skin      After your morning shower.    8. You will need a responsible adult to drive you home and check on you after surgery.

## 2025-01-22 RX ORDER — SODIUM CHLORIDE 0.9 % (FLUSH) 0.9 %
5-40 SYRINGE (ML) INJECTION PRN
Status: CANCELLED | OUTPATIENT
Start: 2025-01-22

## 2025-01-22 RX ORDER — SODIUM CHLORIDE, SODIUM LACTATE, POTASSIUM CHLORIDE, CALCIUM CHLORIDE 600; 310; 30; 20 MG/100ML; MG/100ML; MG/100ML; MG/100ML
INJECTION, SOLUTION INTRAVENOUS CONTINUOUS
Status: CANCELLED | OUTPATIENT
Start: 2025-01-22

## 2025-01-22 RX ORDER — SODIUM CHLORIDE 0.9 % (FLUSH) 0.9 %
5-40 SYRINGE (ML) INJECTION EVERY 12 HOURS SCHEDULED
Status: CANCELLED | OUTPATIENT
Start: 2025-01-22

## 2025-01-22 RX ORDER — SODIUM CHLORIDE 9 MG/ML
INJECTION, SOLUTION INTRAVENOUS PRN
Status: CANCELLED | OUTPATIENT
Start: 2025-01-22

## 2025-01-24 ENCOUNTER — ANESTHESIA EVENT (OUTPATIENT)
Dept: ENDOSCOPY | Age: 75
End: 2025-01-24
Payer: COMMERCIAL

## 2025-01-24 NOTE — ANESTHESIA PRE PROCEDURE
Department of Anesthesiology  Preprocedure Note       Name:  Ayesha Martinez   Age:  74 y.o.  :  1950                                          MRN:  [id number]         Date:  2025      Surgeon: Surgeon(s):  Poornima Steele MD    Procedure: Procedure(s):  ESOPHAGOGASTRODUODENOSCOPY DILATION BALLOON    Medications prior to admission:   Prior to Admission medications    Medication Sig Start Date End Date Taking? Authorizing Provider   Multiple Vitamin (MULTIVITAMIN ADULT PO) Take by mouth    Daniel Welsh MD   omeprazole (PRILOSEC) 40 MG delayed release capsule TAKE ONE CAPSULE BY MOUTH EVERY MORNING before breakfast 25   Damion Francis MD   losartan-hydroCHLOROthiazide (HYZAAR) 100-25 MG per tablet Take 0.5 tablets by mouth daily 24   Damion Francis MD   meloxicam (MOBIC) 7.5 MG tablet Take 0.5 tablets by mouth daily 24   Damion Francis MD   methylPREDNISolone (MEDROL, BRITTA,) 4 MG tablet Use as directed 24   Arabella Blas PA-C   folic acid (FOLVITE) 1 MG tablet Take 1 tablet by mouth daily 24   Damion Francis MD   pravastatin (PRAVACHOL) 10 MG tablet Take 1 tablet by mouth daily 9/9/24 3/8/25  Damion Francis MD   rivaroxaban (XARELTO) 20 MG TABS tablet TAKE 1 TABLET BY MOUTH ONE TIME A DAY 24   Damion Francis MD   pramipexole (MIRAPEX) 0.125 MG tablet Take 1 tablet by mouth nightly 24   Damion Francis MD   magnesium gluconate (MAGONATE) 500 MG tablet Take 1 tablet by mouth Daily    ProviderDaneil MD       Current medications:    No current facility-administered medications for this encounter.     Current Outpatient Medications   Medication Sig Dispense Refill    Multiple Vitamin (MULTIVITAMIN ADULT PO) Take by mouth      omeprazole (PRILOSEC) 40 MG delayed release capsule TAKE ONE CAPSULE BY MOUTH EVERY MORNING before breakfast 30 capsule 2    losartan-hydroCHLOROthiazide (HYZAAR) 100-25 MG per tablet Take

## 2025-01-24 NOTE — PROGRESS NOTES
Spoke with patient and she will arrive at 1200 at [facility] on 1/27/2025 for her procedure at 1330.

## 2025-01-27 ENCOUNTER — HOSPITAL ENCOUNTER (OUTPATIENT)
Age: 75
Setting detail: OUTPATIENT SURGERY
Discharge: HOME | End: 2025-01-27
Attending: INTERNAL MEDICINE | Admitting: INTERNAL MEDICINE
Payer: COMMERCIAL

## 2025-01-27 ENCOUNTER — ANESTHESIA (OUTPATIENT)
Dept: ENDOSCOPY | Age: 75
End: 2025-01-27
Payer: COMMERCIAL

## 2025-01-27 VITALS
WEIGHT: 206 LBS | BODY MASS INDEX: 35.17 KG/M2 | RESPIRATION RATE: 18 BRPM | HEART RATE: 79 BPM | OXYGEN SATURATION: 99 % | DIASTOLIC BLOOD PRESSURE: 89 MMHG | HEIGHT: 64 IN | TEMPERATURE: 98.1 F | SYSTOLIC BLOOD PRESSURE: 145 MMHG

## 2025-01-27 DIAGNOSIS — K21.9 GERD (GASTROESOPHAGEAL REFLUX DISEASE): ICD-10-CM

## 2025-01-27 DIAGNOSIS — R13.19 ESOPHAGEAL DYSPHAGIA: ICD-10-CM

## 2025-01-27 PROCEDURE — 88305 TISSUE EXAM BY PATHOLOGIST: CPT

## 2025-01-27 PROCEDURE — 3700000001 HC ADD 15 MINUTES (ANESTHESIA): Performed by: INTERNAL MEDICINE

## 2025-01-27 PROCEDURE — 2709999900 HC NON-CHARGEABLE SUPPLY: Performed by: INTERNAL MEDICINE

## 2025-01-27 PROCEDURE — 88342 IMHCHEM/IMCYTCHM 1ST ANTB: CPT

## 2025-01-27 PROCEDURE — 3700000000 HC ANESTHESIA ATTENDED CARE: Performed by: INTERNAL MEDICINE

## 2025-01-27 PROCEDURE — 3609017700 HC EGD DILATION GASTRIC/DUODENAL STRICTURE: Performed by: INTERNAL MEDICINE

## 2025-01-27 PROCEDURE — 2720000010 HC SURG SUPPLY STERILE: Performed by: INTERNAL MEDICINE

## 2025-01-27 PROCEDURE — 7100000010 HC PHASE II RECOVERY - FIRST 15 MIN: Performed by: INTERNAL MEDICINE

## 2025-01-27 PROCEDURE — 7100000011 HC PHASE II RECOVERY - ADDTL 15 MIN: Performed by: INTERNAL MEDICINE

## 2025-01-27 PROCEDURE — 6360000002 HC RX W HCPCS

## 2025-01-27 RX ORDER — GLYCOPYRROLATE 0.2 MG/ML
INJECTION INTRAMUSCULAR; INTRAVENOUS
Status: DISCONTINUED | OUTPATIENT
Start: 2025-01-27 | End: 2025-01-27 | Stop reason: SDUPTHER

## 2025-01-27 RX ORDER — LIDOCAINE HYDROCHLORIDE 20 MG/ML
INJECTION, SOLUTION INFILTRATION; PERINEURAL
Status: DISCONTINUED | OUTPATIENT
Start: 2025-01-27 | End: 2025-01-27 | Stop reason: SDUPTHER

## 2025-01-27 RX ORDER — SODIUM CHLORIDE 0.9 % (FLUSH) 0.9 %
5-40 SYRINGE (ML) INJECTION PRN
Status: DISCONTINUED | OUTPATIENT
Start: 2025-01-27 | End: 2025-01-27 | Stop reason: HOSPADM

## 2025-01-27 RX ORDER — PROPOFOL 10 MG/ML
INJECTION, EMULSION INTRAVENOUS
Status: DISCONTINUED | OUTPATIENT
Start: 2025-01-27 | End: 2025-01-27 | Stop reason: SDUPTHER

## 2025-01-27 RX ORDER — SODIUM CHLORIDE, SODIUM LACTATE, POTASSIUM CHLORIDE, CALCIUM CHLORIDE 600; 310; 30; 20 MG/100ML; MG/100ML; MG/100ML; MG/100ML
INJECTION, SOLUTION INTRAVENOUS CONTINUOUS
Status: DISCONTINUED | OUTPATIENT
Start: 2025-01-27 | End: 2025-01-27 | Stop reason: HOSPADM

## 2025-01-27 RX ORDER — SODIUM CHLORIDE 0.9 % (FLUSH) 0.9 %
5-40 SYRINGE (ML) INJECTION EVERY 12 HOURS SCHEDULED
Status: DISCONTINUED | OUTPATIENT
Start: 2025-01-27 | End: 2025-01-27 | Stop reason: HOSPADM

## 2025-01-27 RX ORDER — SODIUM CHLORIDE 9 MG/ML
INJECTION, SOLUTION INTRAVENOUS PRN
Status: DISCONTINUED | OUTPATIENT
Start: 2025-01-27 | End: 2025-01-27 | Stop reason: HOSPADM

## 2025-01-27 RX ADMIN — PROPOFOL 150 MG: 10 INJECTION, EMULSION INTRAVENOUS at 14:18

## 2025-01-27 RX ADMIN — LIDOCAINE HYDROCHLORIDE 100 MG: 20 INJECTION, SOLUTION INFILTRATION; PERINEURAL at 14:18

## 2025-01-27 RX ADMIN — GLYCOPYRROLATE 0.2 MG: 0.2 INJECTION INTRAMUSCULAR; INTRAVENOUS at 14:18

## 2025-01-27 ASSESSMENT — PAIN - FUNCTIONAL ASSESSMENT
PAIN_FUNCTIONAL_ASSESSMENT: 0-10

## 2025-01-27 ASSESSMENT — ENCOUNTER SYMPTOMS: SHORTNESS OF BREATH: 1

## 2025-01-27 NOTE — DISCHARGE INSTRUCTIONS
[facility]  [phone]    Do not drive, work around machines or use equipment.  Do not drink any alcoholic beverages.  Do not smoke while alone.  Avoid making important decisions.  Plan to spend a quiet, relaxed evening @ home.  Resume normal activities as you begin to feel better.  Eat lightly for your first meal, then gradually increase your diet to what is normal for you.  In case of nausea, avoid food and drink only clear liquids.  Resume food as nausea ceases.  Notify your surgeon if you experience fever, chills, large amount of bleeding, difficulty breathing, persistent nausea and vomiting or any other disturbing problem.  Call for a follow-up appointment with your surgeon.     EGD    DR. TOMPKINS    OFFICE NUMBER  [phone]    FOLLOW UP APPOINTMENT AS NEEDED.    REPEAT PROCEDURE AS  NEEDED.    What to Expect at Home  Your Recovery:  The only discomfort after your EGD is generally limited to a mild soreness of the throat, which may last a day or two. Call your physician immediately if you have severe chest pain, shortness of breath or a temperature of 100 degrees or higher if taken orally.    How can you care for yourself at home?  Activity  Rest as much as you need to after you go home.  You should be able to go back to your usual activities the day after the test.  Diet  Follow your doctor's directions for eating after the test.  Drink plenty of fluids (unless your doctor has told you not to).  Medications  If you have a sore throat the day after the test, use an over-the-counter spray to numb your throat.  Your doctor will tell you if and when you can restart your medicines. He or she will also give you instructions about taking any new medicines.  If you take blood thinners, such as warfarin (Coumadin), clopidogrel (Plavix), or aspirin, be sure to talk to your doctor. He or she will tell you if and when to start taking those medicines again. Make sure that you understand

## 2025-01-27 NOTE — ANESTHESIA PRE PROCEDURE
Department of Anesthesiology  Preprocedure Note       Name:  Ayesha Martinez   Age:  74 y.o.  :  1950                                          MRN:  [id number]         Date:  2025      Surgeon: Surgeon(s):  Poornima Steele MD    Procedure: Procedure(s):  ESOPHAGOGASTRODUODENOSCOPY DILATION BALLOON    Medications prior to admission:   Prior to Admission medications    Medication Sig Start Date End Date Taking? Authorizing Provider   Multiple Vitamin (MULTIVITAMIN ADULT PO) Take by mouth    Daniel Welsh MD   omeprazole (PRILOSEC) 40 MG delayed release capsule TAKE ONE CAPSULE BY MOUTH EVERY MORNING before breakfast 25   Damion Francis MD   losartan-hydroCHLOROthiazide (HYZAAR) 100-25 MG per tablet Take 0.5 tablets by mouth daily 24   Damion Francis MD   meloxicam (MOBIC) 7.5 MG tablet Take 0.5 tablets by mouth daily 24   Damion Francis MD   methylPREDNISolone (MEDROL, BRITTA,) 4 MG tablet Use as directed 24   Arabella Blas PA-C   folic acid (FOLVITE) 1 MG tablet Take 1 tablet by mouth daily 24   Damion Francis MD   pravastatin (PRAVACHOL) 10 MG tablet Take 1 tablet by mouth daily 9/9/24 3/8/25  Damion Francis MD   rivaroxaban (XARELTO) 20 MG TABS tablet TAKE 1 TABLET BY MOUTH ONE TIME A DAY 24   Damion Francis MD   pramipexole (MIRAPEX) 0.125 MG tablet Take 1 tablet by mouth nightly 24   Damion Francis MD   magnesium gluconate (MAGONATE) 500 MG tablet Take 1 tablet by mouth Daily    Daniel Welsh MD       Current medications:    Current Facility-Administered Medications   Medication Dose Route Frequency Provider Last Rate Last Admin    sodium chloride flush 0.9 % injection 5-40 mL  5-40 mL IntraVENous 2 times per day Brenda Alcantara MD        sodium chloride flush 0.9 % injection 5-40 mL  5-40 mL IntraVENous PRN Brenda Alcantara MD        0.9 % sodium chloride infusion   IntraVENous PRN Brenda Alcantara MD

## 2025-01-27 NOTE — ANESTHESIA POSTPROCEDURE EVALUATION
Department of Anesthesiology  Postprocedure Note    Patient: Ayesha Martinez  MRN: [id number]  YOB: 1950  Date of evaluation: 1/27/2025    Procedure Summary       Date: 01/27/25 Room / Location: [place] [id number] / [facility]    Anesthesia Start: 1411 Anesthesia Stop: 1433    Procedure: ESOPHAGOGASTRODUODENOSCOPY DILATION BALLOON 18MM-20MM BALLOON DILATED TO 20MM UPPER AND LOWER ESOPHAGUS DILATED EGD DILATION BEST 52 Korean  EGD BIOPSY Diagnosis:       Esophageal dysphagia      GERD (gastroesophageal reflux disease)      (Esophageal dysphagia [R13.19])      (GERD (gastroesophageal reflux disease) [K21.9])    Surgeons: Poornima Steele MD Responsible Provider: Shashi Meyers MD    Anesthesia Type: MAC ASA Status: 3            Anesthesia Type: MAC    Beth Phase I: Beth Score: 10    Beth Phase II:      Anesthesia Post Evaluation    Patient location during evaluation: floor  Level of consciousness: awake  Pain score: 0  Airway patency: patent  Nausea & Vomiting: no nausea and no vomiting  Cardiovascular status: blood pressure returned to baseline  Respiratory status: acceptable, room air and spontaneous ventilation  Hydration status: stable  Pain management: adequate    There were no known notable events for this encounter.

## 2025-01-27 NOTE — INTERVAL H&P NOTE
Update History & Physical    The patient's History and Physical of January 15, 2025 was reviewed with the patient and I examined the patient. There was no change. The surgical site was confirmed by the patient and me.     Plan: The risks, benefits, expected outcome, and alternative to the recommended procedure have been discussed with the patient. Patient understands and wants to proceed with the procedure.     Electronically signed by Poornima Steele MD on 1/27/2025 at 1:05 PM

## 2025-01-27 NOTE — PROGRESS NOTES
1442: Patient returns to [facility] following procedure, bedside report received from Gilda BLANKENSHIP, VSS, family at bedside, call light in reach, beverage of choice provided.   1457: VSS, Discharge instructions reviewed with patient and  spouse  , both verbalized understanding.   1500: IV removed, Patient dressing, call light in reach  1515: Patient taken via wheelchair to DC to car w family.

## 2025-01-30 LAB — SURGICAL PATHOLOGY REPORT: NORMAL

## 2025-01-31 NOTE — RESULT ENCOUNTER NOTE
Surgical pathology revealed the stomach had some mild patchy inflammation however no cancer or dysplasia found.  There was some cellular changes noted in the stomach however there is no H. pylori/infection we will need a repeat EGD in 3 years.

## 2025-02-03 DIAGNOSIS — R13.10 DYSPHAGIA, UNSPECIFIED TYPE: ICD-10-CM

## 2025-02-03 DIAGNOSIS — R22.1 THROAT SWELLING: ICD-10-CM

## 2025-02-03 NOTE — TELEPHONE ENCOUNTER
Omeprazole-please cx remaining refills at [facility] and send to [facility] for 90/1    Losartan hctz-pt went back to taking 1 qd-90/1 [facility]

## 2025-02-04 RX ORDER — OMEPRAZOLE 40 MG/1
40 CAPSULE, DELAYED RELEASE ORAL
Qty: 90 CAPSULE | Refills: 1 | Status: SHIPPED | OUTPATIENT
Start: 2025-02-04

## 2025-02-04 RX ORDER — LOSARTAN POTASSIUM AND HYDROCHLOROTHIAZIDE 25; 100 MG/1; MG/1
1 TABLET ORAL DAILY
Qty: 90 TABLET | Refills: 1 | Status: SHIPPED | OUTPATIENT
Start: 2025-02-04

## 2025-02-25 RX ORDER — PRAMIPEXOLE DIHYDROCHLORIDE 0.12 MG/1
0.12 TABLET ORAL NIGHTLY
Qty: 90 TABLET | Refills: 1 | Status: SHIPPED | OUTPATIENT
Start: 2025-02-25

## 2025-03-14 ENCOUNTER — HOSPITAL ENCOUNTER (OUTPATIENT)
Dept: GENERAL RADIOLOGY | Age: 75
Discharge: HOME | End: 2025-03-14
Payer: MEDICARE

## 2025-03-14 ENCOUNTER — OFFICE VISIT (OUTPATIENT)
Dept: FAMILY MEDICINE CLINIC | Age: 75
End: 2025-03-14
Payer: MEDICARE

## 2025-03-14 ENCOUNTER — HOSPITAL ENCOUNTER (OUTPATIENT)
Age: 75
Discharge: HOME | End: 2025-03-14
Payer: MEDICARE

## 2025-03-14 VITALS
DIASTOLIC BLOOD PRESSURE: 60 MMHG | BODY MASS INDEX: 36.5 KG/M2 | SYSTOLIC BLOOD PRESSURE: 110 MMHG | OXYGEN SATURATION: 100 % | RESPIRATION RATE: 20 BRPM | HEART RATE: 92 BPM | WEIGHT: 213.8 LBS | HEIGHT: 64 IN

## 2025-03-14 DIAGNOSIS — I10 HYPERTENSION, ESSENTIAL: ICD-10-CM

## 2025-03-14 DIAGNOSIS — G89.29 CHRONIC BILATERAL LOW BACK PAIN WITH BILATERAL SCIATICA: ICD-10-CM

## 2025-03-14 DIAGNOSIS — E66.01 CLASS 3 SEVERE OBESITY DUE TO EXCESS CALORIES WITHOUT SERIOUS COMORBIDITY WITH BODY MASS INDEX (BMI) OF 40.0 TO 44.9 IN ADULT: Chronic | ICD-10-CM

## 2025-03-14 DIAGNOSIS — E78.5 HYPERLIPIDEMIA, UNSPECIFIED HYPERLIPIDEMIA TYPE: ICD-10-CM

## 2025-03-14 DIAGNOSIS — M54.2 NECK PAIN ON RIGHT SIDE: ICD-10-CM

## 2025-03-14 DIAGNOSIS — M54.42 CHRONIC BILATERAL LOW BACK PAIN WITH BILATERAL SCIATICA: ICD-10-CM

## 2025-03-14 DIAGNOSIS — G25.81 RLS (RESTLESS LEGS SYNDROME): ICD-10-CM

## 2025-03-14 DIAGNOSIS — E66.813 CLASS 3 SEVERE OBESITY DUE TO EXCESS CALORIES WITHOUT SERIOUS COMORBIDITY WITH BODY MASS INDEX (BMI) OF 40.0 TO 44.9 IN ADULT: Chronic | ICD-10-CM

## 2025-03-14 DIAGNOSIS — M54.2 NECK PAIN ON RIGHT SIDE: Primary | ICD-10-CM

## 2025-03-14 DIAGNOSIS — M54.41 CHRONIC BILATERAL LOW BACK PAIN WITH BILATERAL SCIATICA: ICD-10-CM

## 2025-03-14 DIAGNOSIS — M15.9 GENERALIZED OA: Chronic | ICD-10-CM

## 2025-03-14 LAB
ALBUMIN SERPL-MCNC: 4.2 G/DL (ref 3.4–5)
ALBUMIN/GLOB SERPL: 1.8 {RATIO} (ref 1.1–2.2)
ALP SERPL-CCNC: 79 U/L (ref 40–129)
ALT SERPL-CCNC: 26 U/L (ref 10–40)
ANION GAP SERPL CALCULATED.3IONS-SCNC: 11 MMOL/L (ref 3–16)
AST SERPL-CCNC: 23 U/L (ref 15–37)
BILIRUB SERPL-MCNC: 0.6 MG/DL (ref 0–1)
BUN SERPL-MCNC: 23 MG/DL (ref 7–20)
CALCIUM SERPL-MCNC: 9.9 MG/DL (ref 8.3–10.6)
CHLORIDE SERPL-SCNC: 103 MMOL/L (ref 99–110)
CHOLEST SERPL-MCNC: 213 MG/DL (ref 0–199)
CO2 SERPL-SCNC: 28 MMOL/L (ref 21–32)
CREAT SERPL-MCNC: 0.9 MG/DL (ref 0.6–1.2)
DEPRECATED RDW RBC AUTO: 14.4 % (ref 12.4–15.4)
GFR SERPLBLD CREATININE-BSD FMLA CKD-EPI: 66 ML/MIN/{1.73_M2}
GLUCOSE SERPL-MCNC: 96 MG/DL (ref 70–99)
HCT VFR BLD AUTO: 39.5 % (ref 36–48)
HDLC SERPL-MCNC: 67 MG/DL (ref 40–60)
HGB BLD-MCNC: 13 G/DL (ref 12–16)
LDLC SERPL CALC-MCNC: 127 MG/DL
MCH RBC QN AUTO: 29.2 PG (ref 26–34)
MCHC RBC AUTO-ENTMCNC: 32.9 G/DL (ref 31–36)
MCV RBC AUTO: 88.9 FL (ref 80–100)
PLATELET # BLD AUTO: 297 K/UL (ref 135–450)
PMV BLD AUTO: 9 FL (ref 5–10.5)
POTASSIUM SERPL-SCNC: 4.6 MMOL/L (ref 3.5–5.1)
PROT SERPL-MCNC: 6.5 G/DL (ref 6.4–8.2)
RBC # BLD AUTO: 4.44 M/UL (ref 4–5.2)
SODIUM SERPL-SCNC: 142 MMOL/L (ref 136–145)
TRIGL SERPL-MCNC: 93 MG/DL (ref 0–150)
VLDLC SERPL CALC-MCNC: 19 MG/DL
WBC # BLD AUTO: 6.2 K/UL (ref 4–11)

## 2025-03-14 PROCEDURE — 1159F MED LIST DOCD IN RCRD: CPT | Performed by: FAMILY MEDICINE

## 2025-03-14 PROCEDURE — 3078F DIAST BP <80 MM HG: CPT | Performed by: FAMILY MEDICINE

## 2025-03-14 PROCEDURE — 72050 X-RAY EXAM NECK SPINE 4/5VWS: CPT

## 2025-03-14 PROCEDURE — 1036F TOBACCO NON-USER: CPT | Performed by: FAMILY MEDICINE

## 2025-03-14 PROCEDURE — 1123F ACP DISCUSS/DSCN MKR DOCD: CPT | Performed by: FAMILY MEDICINE

## 2025-03-14 PROCEDURE — 99214 OFFICE O/P EST MOD 30 MIN: CPT | Performed by: FAMILY MEDICINE

## 2025-03-14 PROCEDURE — 3074F SYST BP LT 130 MM HG: CPT | Performed by: FAMILY MEDICINE

## 2025-03-14 PROCEDURE — G8427 DOCREV CUR MEDS BY ELIG CLIN: HCPCS | Performed by: FAMILY MEDICINE

## 2025-03-14 PROCEDURE — G8400 PT W/DXA NO RESULTS DOC: HCPCS | Performed by: FAMILY MEDICINE

## 2025-03-14 PROCEDURE — 1090F PRES/ABSN URINE INCON ASSESS: CPT | Performed by: FAMILY MEDICINE

## 2025-03-14 PROCEDURE — 3017F COLORECTAL CA SCREEN DOC REV: CPT | Performed by: FAMILY MEDICINE

## 2025-03-14 PROCEDURE — G8417 CALC BMI ABV UP PARAM F/U: HCPCS | Performed by: FAMILY MEDICINE

## 2025-03-14 SDOH — ECONOMIC STABILITY: FOOD INSECURITY: WITHIN THE PAST 12 MONTHS, THE FOOD YOU BOUGHT JUST DIDN'T LAST AND YOU DIDN'T HAVE MONEY TO GET MORE.: NEVER TRUE

## 2025-03-14 SDOH — ECONOMIC STABILITY: FOOD INSECURITY: WITHIN THE PAST 12 MONTHS, YOU WORRIED THAT YOUR FOOD WOULD RUN OUT BEFORE YOU GOT MONEY TO BUY MORE.: NEVER TRUE

## 2025-03-14 ASSESSMENT — PATIENT HEALTH QUESTIONNAIRE - PHQ9
SUM OF ALL RESPONSES TO PHQ QUESTIONS 1-9: 0
2. FEELING DOWN, DEPRESSED OR HOPELESS: NOT AT ALL
1. LITTLE INTEREST OR PLEASURE IN DOING THINGS: NOT AT ALL
SUM OF ALL RESPONSES TO PHQ QUESTIONS 1-9: 0

## 2025-03-14 ASSESSMENT — ENCOUNTER SYMPTOMS
BACK PAIN: 1
CHEST TIGHTNESS: 0
WHEEZING: 0
TROUBLE SWALLOWING: 0
VOMITING: 0
BLOOD IN STOOL: 0
EYE PAIN: 0
NAUSEA: 0
ABDOMINAL PAIN: 0
SHORTNESS OF BREATH: 0
DIARRHEA: 0

## 2025-03-14 NOTE — PROGRESS NOTES
3/14/2025    [facility]    Chief Complaint   Patient presents with    6 Month Follow-Up     6 month f/u     Neck Pain     Neck pain x 2 months. Patient states pain increases when turning head to the side.     Back Pain     Ongoing back pain. States this has increased.        HPI  History was obtained from patient.  Ayesha is a 74 y.o. female who presents today with recheck.  Has history of diffuse arthralgia especially involving spine.  Other issues include obesity, hypertension, past history of PE and DVT chronic on NOAC therapy.  Other issues include hyperlipidemia nonalcoholic fatty liver disease, restless leg symptoms and GERD.  Continues to work but is about to retire from [employer].  Has had a little bit of trouble with sleep a lot of it has to do with discomfort from hip has seen Ortho no obvious cause for hip pain is found.  Certainly may be coming from her low back as she has severe arthritis of back with spinal stenosis.  Also gained a bit of weight because of stress eating her  has metastatic prostate cancer.  Otherwise she has been holding her own neck pain is worse when she turns her head to the right limited movement to the left she sits at home in her chair talking with her  with her head turned to the right.  No radicular symptoms reported.    REVIEW OF SYMPTOMS    Review of Systems   Constitutional:  Negative for activity change and fatigue.   HENT:  Negative for congestion, hearing loss, mouth sores and trouble swallowing.    Eyes:  Negative for pain and visual disturbance.   Respiratory:  Negative for chest tightness, shortness of breath and wheezing.    Cardiovascular:  Negative for chest pain and palpitations.        History of PE and DVT on anticoagulation therapy   Gastrointestinal:  Negative for abdominal pain, blood in stool, diarrhea, nausea and vomiting.        History of NAFLD   Endocrine: Negative for polydipsia and polyuria.   Genitourinary:  Negative for dysuria,

## 2025-03-17 ENCOUNTER — RESULTS FOLLOW-UP (OUTPATIENT)
Dept: GENERAL RADIOLOGY | Age: 75
End: 2025-03-17

## 2025-03-17 NOTE — TELEPHONE ENCOUNTER
Left detailed message on patient identified voicemail. Instructed to call the office to let us know how she is doing.

## 2025-03-17 NOTE — TELEPHONE ENCOUNTER
----- Message from Dr. Damion Francis MD sent at 3/17/2025  7:35 AM EDT -----  Please inform patient that labs are essentially stable continue to work on low-fat diet.  C-spine x-rays show degenerative changes at several levels.  If neck pain persists would pursue physical therapy and consider MRI of C-spine if PT not helpful with symptoms of pain.

## 2025-03-19 RX ORDER — FOLIC ACID 1 MG/1
1 TABLET ORAL DAILY
Qty: 90 TABLET | Refills: 1 | Status: SHIPPED | OUTPATIENT
Start: 2025-03-19

## 2025-03-19 RX ORDER — MELOXICAM 7.5 MG/1
3.75 TABLET ORAL DAILY
Qty: 45 TABLET | Refills: 0 | Status: SHIPPED | OUTPATIENT
Start: 2025-03-19

## 2025-03-19 RX ORDER — PRAVASTATIN SODIUM 10 MG
10 TABLET ORAL DAILY
Qty: 90 TABLET | Refills: 1 | Status: SHIPPED | OUTPATIENT
Start: 2025-03-19 | End: 2025-09-15

## 2025-03-20 NOTE — TELEPHONE ENCOUNTER
PT FORGOT TO CHANGE PHARM (3 THAT WENT 3/19) NOW THAT SEE IS RETIRING. COULD YOU CX AT [facility] AND RESEND TO [facility] PLEASE

## 2025-03-21 RX ORDER — MELOXICAM 7.5 MG/1
3.75 TABLET ORAL DAILY
Qty: 45 TABLET | Refills: 0 | OUTPATIENT
Start: 2025-03-21

## 2025-03-21 RX ORDER — FOLIC ACID 1 MG/1
1 TABLET ORAL DAILY
Qty: 90 TABLET | Refills: 1 | OUTPATIENT
Start: 2025-03-21

## 2025-03-21 RX ORDER — PRAVASTATIN SODIUM 10 MG
10 TABLET ORAL DAILY
Qty: 90 TABLET | Refills: 1 | OUTPATIENT
Start: 2025-03-21 | End: 2025-09-17

## 2025-03-24 NOTE — TELEPHONE ENCOUNTER
Okay to send 3 requested prescriptions to [facility] and cancel them to [facility].  Please see note in chart-[facility]

## 2025-03-27 ENCOUNTER — TELEPHONE (OUTPATIENT)
Dept: FAMILY MEDICINE CLINIC | Age: 75
End: 2025-03-27

## 2025-03-27 RX ORDER — MELOXICAM 7.5 MG/1
3.75 TABLET ORAL DAILY
Qty: 7 TABLET | Refills: 0 | Status: SHIPPED | OUTPATIENT
Start: 2025-03-27 | End: 2025-04-10

## 2025-03-27 RX ORDER — FOLIC ACID 1 MG/1
1 TABLET ORAL DAILY
Qty: 90 TABLET | Refills: 1 | Status: SHIPPED | OUTPATIENT
Start: 2025-03-27

## 2025-03-27 RX ORDER — MELOXICAM 7.5 MG/1
3.75 TABLET ORAL DAILY
Qty: 45 TABLET | Refills: 0 | Status: SHIPPED | OUTPATIENT
Start: 2025-03-27

## 2025-03-27 RX ORDER — PRAVASTATIN SODIUM 10 MG
10 TABLET ORAL DAILY
Qty: 90 TABLET | Refills: 1 | Status: SHIPPED | OUTPATIENT
Start: 2025-03-27 | End: 2025-09-23

## 2025-03-27 NOTE — TELEPHONE ENCOUNTER
PT WOULD LIKE A 2 WK SUPPLY OF THE MELOXICAM SENT TO [facility] TO HOLD HER OVER TIL MAIL ORDER COMES. PT IS HURTING, CAN YOU DO THIS TODAY? PLEASE AND TY

## 2025-04-08 ENCOUNTER — HOSPITAL ENCOUNTER (OUTPATIENT)
Dept: MRI IMAGING | Age: 75
Discharge: HOME | End: 2025-04-08
Payer: MEDICARE

## 2025-04-08 DIAGNOSIS — M54.41 CHRONIC BILATERAL LOW BACK PAIN WITH BILATERAL SCIATICA: ICD-10-CM

## 2025-04-08 DIAGNOSIS — M54.42 CHRONIC BILATERAL LOW BACK PAIN WITH BILATERAL SCIATICA: ICD-10-CM

## 2025-04-08 DIAGNOSIS — G89.29 CHRONIC BILATERAL LOW BACK PAIN WITH BILATERAL SCIATICA: ICD-10-CM

## 2025-04-08 PROCEDURE — 72148 MRI LUMBAR SPINE W/O DYE: CPT

## 2025-04-09 ENCOUNTER — RESULTS FOLLOW-UP (OUTPATIENT)
Dept: FAMILY MEDICINE CLINIC | Age: 75
End: 2025-04-09

## 2025-04-09 DIAGNOSIS — G89.29 CHRONIC BILATERAL LOW BACK PAIN WITH BILATERAL SCIATICA: Primary | ICD-10-CM

## 2025-04-09 DIAGNOSIS — R93.7 ABNORMAL MRI, LUMBAR SPINE: ICD-10-CM

## 2025-04-09 DIAGNOSIS — M15.9 GENERALIZED OA: Chronic | ICD-10-CM

## 2025-04-09 DIAGNOSIS — M54.41 CHRONIC BILATERAL LOW BACK PAIN WITH BILATERAL SCIATICA: Primary | ICD-10-CM

## 2025-04-09 DIAGNOSIS — M54.42 CHRONIC BILATERAL LOW BACK PAIN WITH BILATERAL SCIATICA: Primary | ICD-10-CM

## 2025-04-09 DIAGNOSIS — M48.00 SPINAL STENOSIS, UNSPECIFIED SPINAL REGION: ICD-10-CM

## 2025-04-09 DIAGNOSIS — M25.50 ARTHRALGIA, UNSPECIFIED JOINT: ICD-10-CM

## 2025-05-08 RX ORDER — PRAMIPEXOLE DIHYDROCHLORIDE 0.12 MG/1
TABLET ORAL
Qty: 90 TABLET | Refills: 0 | OUTPATIENT
Start: 2025-05-08

## 2025-05-08 RX ORDER — LOSARTAN POTASSIUM AND HYDROCHLOROTHIAZIDE 25; 100 MG/1; MG/1
TABLET ORAL
Qty: 90 TABLET | Refills: 0 | OUTPATIENT
Start: 2025-05-08

## 2025-05-09 RX ORDER — PRAMIPEXOLE DIHYDROCHLORIDE 0.12 MG/1
0.12 TABLET ORAL NIGHTLY
Qty: 90 TABLET | Refills: 1 | Status: SHIPPED | OUTPATIENT
Start: 2025-05-09

## 2025-05-09 RX ORDER — LOSARTAN POTASSIUM AND HYDROCHLOROTHIAZIDE 25; 100 MG/1; MG/1
1 TABLET ORAL DAILY
Qty: 90 TABLET | Refills: 1 | Status: SHIPPED | OUTPATIENT
Start: 2025-05-09

## 2025-05-21 DIAGNOSIS — R22.1 THROAT SWELLING: ICD-10-CM

## 2025-05-21 DIAGNOSIS — R13.10 DYSPHAGIA, UNSPECIFIED TYPE: ICD-10-CM

## 2025-05-21 RX ORDER — OMEPRAZOLE 40 MG/1
40 CAPSULE, DELAYED RELEASE ORAL
Qty: 90 CAPSULE | Refills: 1 | Status: SHIPPED | OUTPATIENT
Start: 2025-05-21

## 2025-05-21 RX ORDER — OMEPRAZOLE 40 MG/1
CAPSULE, DELAYED RELEASE ORAL
Qty: 90 CAPSULE | Refills: 0 | OUTPATIENT
Start: 2025-05-21

## 2025-05-22 RX ORDER — MELOXICAM 7.5 MG/1
3.75 TABLET ORAL DAILY
Qty: 45 TABLET | Refills: 1 | Status: SHIPPED | OUTPATIENT
Start: 2025-05-22

## 2025-06-04 RX ORDER — MELOXICAM 7.5 MG/1
3.75 TABLET ORAL DAILY
Qty: 45 TABLET | Refills: 1 | Status: SHIPPED | OUTPATIENT
Start: 2025-06-04

## 2025-06-05 NOTE — PROGRESS NOTES
MRN: [id number]  Name: Ayesha Martinez  : 1950    Insurance: Payor: MEDICARE /  /  /      Phone #: [phone]  Provider: Rachel Villatoro MD     Date of Visit: 2025    Reason for visit: $Shauna New Patient discuss Short /Breath  Recent Hospitalization Date:    Reason for Hospitalization:    Last EKG: NEEDS   Type of Device:       Vitals BP HR O2% WT HT ORTHO BP LYING ORTHO BP SITTING ORTHO BP SITTING   Today's Findings           Patients work up- Check List     Testing Last Date Completed Date Expected  ([facility]) Additional Notes    MA to document For provider to complete Either MA or Provider    Carotid Duplex  STAT 1 WK 6 MTH       THIS WK 2 WK 1 YEAR     Cardiac CTA  STAT 1 WK 6 MTH       THIS WK 2 WK 1 YEAR     Cardiac CT Calcium scoring  STAT 1 WK 6 MTH       THIS WK 2 WK 1 YEAR     CTA Chest, Abdomen & Pelvis  STAT 1 WK 6 MTH       THIS WK 2 WK 1 YEAR     CT Chest IV w/ Contrast  STAT 1 WK 6 MTH       THIS WK 2 WK 1 YEAR     CT Chest w/o Contrast  STAT 1 WK 6 MTH       THIS WK 2 WK 1 YEAR     CXR  STAT 1 WK 6 MTH       THIS WK 2 WK 1 YEAR     ECHO  Stress Complete Limited     MRI- Cardiac  STAT 1 WK 6 MTH       THIS WK 2 WK 1 YEAR     MUGA Scan  STAT 1 WK 6 MTH       THIS WK 2 WK 1 YEAR     Nuclear Stress  Lexiscan Cardiolite     PFT  STAT 1 WK 6 MTH       THIS WK 2 WK 1 YEAR     Treadmill Stress Test  STAT 1 WK 6 MTH       THIS WK 2 WK 1 YEAR     Vascular Duplex  Lower: Right Left Bilat       Upper: Right Left Bilat     Other Test Not Listed:    Monitors Last Date Completed Day's Request/Ordered     Holter  Short term 24 hours 48 hours      Long term 3 days 7 days 14 days   Event   (1-30 days)      Procedures Last Date Performed Procedure Details Date Expected   Additional Notes    ASD Closure        Carotid Angio        Cardioversion        Heart Cath  R L R&L      Peripheral Angio  R L      PFO Closure        PTCA/PCI        BERTO        BERTO/Cardioversion        Venogram        Tilt Table

## 2025-06-13 ENCOUNTER — OFFICE VISIT (OUTPATIENT)
Dept: CARDIOLOGY CLINIC | Age: 75
End: 2025-06-13

## 2025-06-13 VITALS
HEIGHT: 64 IN | BODY MASS INDEX: 40.43 KG/M2 | DIASTOLIC BLOOD PRESSURE: 64 MMHG | WEIGHT: 236.8 LBS | SYSTOLIC BLOOD PRESSURE: 144 MMHG | HEART RATE: 80 BPM

## 2025-06-13 DIAGNOSIS — R06.02 SHORTNESS OF BREATH: ICD-10-CM

## 2025-06-13 DIAGNOSIS — R06.02 SOB (SHORTNESS OF BREATH): Primary | ICD-10-CM

## 2025-06-13 DIAGNOSIS — R00.2 PALPITATIONS: ICD-10-CM

## 2025-06-13 DIAGNOSIS — M79.89 LEFT LEG SWELLING: ICD-10-CM

## 2025-06-13 RX ORDER — ASPIRIN 81 MG/1
TABLET, CHEWABLE ORAL
COMMUNITY
Start: 2025-04-28

## 2025-06-13 NOTE — PATIENT INSTRUCTIONS
Thank you for allowing us to care for you today!   We want to ensure we can follow your treatment plan and we strive to give you the best outcomes and experience possible.   If you ever have a life threatening emergency and call 911 - for an ambulance (EMS)  REMEMBER  Our providers can only care for you at:   [facility] or [facility]   Even if you have someone take you or you drive yourself we can only care for you in a [facility] facility. Our providers are not setup at the other healthcare locations!    PLEASE CALL OUR OFFICE DURING NORMAL BUSINESS HOURS  Monday through Friday 8 am to 5 pm  AFTER HOURS the physician on-call cannot help with scheduling, rescheduling, procedure instruction questions or any type of medication need or issue.  [facility] P:[phone] - [facility] P:[phone] - [facility] P:[phone]      If you receive a survey:  We would appreciate you taking the time to share your experience concerning your provider visit in the office.    These surveys are confidential!  We are eager to improve and are counting on you to share your feedback so we can ensure you get the best care possible.

## 2025-06-13 NOTE — PROGRESS NOTES
CLINICAL STAFF DOCUMENTATION    Dr. Rachel Martinez  1950  [id number]    Have you had any Chest Pain recently? - pain in the center of her back a few times over the past month.        Have you had any Shortness of Breath - yes, for about a month. On exertion. She states usually they stop once she rests and takes a few breaths.       Have you had any dizziness - No    Have you had any palpitations recently? - a few, only lasting a second.     Do you have any edema - swelling in yes, bilateral legs, feet an ankles.       When did you have your last labs drawn 03/2025  What doctor ordered Cavazos  Do we have the labs in their chart Yes    Do you need any prescriptions refilled? - No    Do you have a surgery or procedure scheduled in the near future - No    Do use tobacco products? - No  Do you drink alcohol? - No  Do you use any illicit drugs? - No  Caffeine? - No    Check medication list thoroughly!!! AND RECONCILE OUTSIDE MEDICATIONS  If dose has changed change the entire order not just the MG  BE SURE TO ASK PATIENT IF THEY NEED MEDICATION REFILLS  Verify Pharmacy and update if incorrect    Add to every patient's \"wrap up\" the following dot phrase AFTERVISITCARDIOHEARTHOUSE and ensure we explain this to our patients   
from the morbid obesity is very high:     Body mass index is 40.65 kg/m².      JO NOBLE MD    [facility]    Please note this report has been partially produced using speech recognition software and may contain errors related to that system including errors in grammar, punctuation, and spelling, as well as words and phrases that may be inappropriate. If there are any questions or concerns please feel free to contact the dictating provider for clarification.

## 2025-06-15 PROBLEM — R06.02 SHORTNESS OF BREATH: Status: ACTIVE | Noted: 2025-06-15

## 2025-06-16 ENCOUNTER — TELEPHONE (OUTPATIENT)
Dept: CARDIOLOGY CLINIC | Age: 75
End: 2025-06-16

## 2025-06-25 RX ORDER — RIVAROXABAN 20 MG/1
20 TABLET, FILM COATED ORAL DAILY
Qty: 90 TABLET | Refills: 0 | Status: SHIPPED | OUTPATIENT
Start: 2025-06-25

## 2025-07-09 ENCOUNTER — OFFICE VISIT (OUTPATIENT)
Dept: ORTHOPEDIC SURGERY | Age: 75
End: 2025-07-09

## 2025-07-09 ENCOUNTER — HOSPITAL ENCOUNTER (OUTPATIENT)
Dept: CT IMAGING | Age: 75
Discharge: HOME | End: 2025-07-09
Attending: INTERNAL MEDICINE
Payer: MEDICARE

## 2025-07-09 VITALS
RESPIRATION RATE: 14 BRPM | HEIGHT: 64 IN | HEART RATE: 64 BPM | BODY MASS INDEX: 41.83 KG/M2 | WEIGHT: 245 LBS | OXYGEN SATURATION: 98 %

## 2025-07-09 DIAGNOSIS — R06.02 SHORTNESS OF BREATH: ICD-10-CM

## 2025-07-09 DIAGNOSIS — M70.61 TROCHANTERIC BURSITIS OF RIGHT HIP: Primary | ICD-10-CM

## 2025-07-09 DIAGNOSIS — R00.2 PALPITATIONS: ICD-10-CM

## 2025-07-09 DIAGNOSIS — M17.11 ARTHRITIS OF RIGHT KNEE: ICD-10-CM

## 2025-07-09 DIAGNOSIS — R06.02 SOB (SHORTNESS OF BREATH): ICD-10-CM

## 2025-07-09 PROBLEM — Q82.0 HEREDITARY LYMPHEDEMA: Status: ACTIVE | Noted: 2025-07-09

## 2025-07-09 LAB
EGFR, POC: 59 ML/MIN/1.73M2
POC CREATININE: 1 MG/DL (ref 0.5–1.2)

## 2025-07-09 PROCEDURE — 82565 ASSAY OF CREATININE: CPT

## 2025-07-09 PROCEDURE — 71275 CT ANGIOGRAPHY CHEST: CPT

## 2025-07-09 PROCEDURE — 6360000004 HC RX CONTRAST MEDICATION: Performed by: INTERNAL MEDICINE

## 2025-07-09 RX ORDER — IOPAMIDOL 755 MG/ML
75 INJECTION, SOLUTION INTRAVASCULAR
Status: COMPLETED | OUTPATIENT
Start: 2025-07-09 | End: 2025-07-09

## 2025-07-09 RX ORDER — TRIAMCINOLONE ACETONIDE 40 MG/ML
40 INJECTION, SUSPENSION INTRA-ARTICULAR; INTRAMUSCULAR ONCE
Status: COMPLETED | OUTPATIENT
Start: 2025-07-09 | End: 2025-07-09

## 2025-07-09 RX ADMIN — IOPAMIDOL 75 ML: 755 INJECTION, SOLUTION INTRAVENOUS at 13:41

## 2025-07-09 RX ADMIN — TRIAMCINOLONE ACETONIDE 40 MG: 40 INJECTION, SUSPENSION INTRA-ARTICULAR; INTRAMUSCULAR at 09:47

## 2025-07-09 ASSESSMENT — ENCOUNTER SYMPTOMS
GASTROINTESTINAL NEGATIVE: 1
EYES NEGATIVE: 1
RESPIRATORY NEGATIVE: 1

## 2025-07-09 NOTE — PATIENT INSTRUCTIONS
Continue weight-bearing as tolerated.  Continue range of motion exercises as instructed.  Ice and elevate as needed.  Tylenol or Motrin for pain.  Injection given into the right hip / right knee.  Follow up as needed.    We are committed to providing you the best care possible.  If you receive a survey after visiting one of our offices, please take time to share your experience concerning your physician office visit.  These surveys are confidential and no health information about you is shared.  We are eager to improve for you and we are counting on your feedback to help make that happen.    If you have any question post operatively. Please contact office to speak with [facility].    We are committed to providing you the best care possible.  If you receive a survey after visiting one of our offices, please take time to share your experience concerning your physician office visit.  These surveys are confidential and no health information about you is shared.  We are eager to improve for you and we are counting on your feedback to help make that happen.

## 2025-07-09 NOTE — PROGRESS NOTES
I reviewed and agree with the portions of the HPI, review of systems, vital documentation and plan performed by my staff and have added/addended where appropriate.    Review of Systems   Constitutional: Negative.    HENT: Negative.     Eyes: Negative.    Respiratory: Negative.     Cardiovascular: Negative.    Gastrointestinal: Negative.    Genitourinary: Negative.    Musculoskeletal:  Positive for arthralgias and gait problem.   Skin: Negative.  Negative for rash and wound.   Psychiatric/Behavioral: Negative.         Current updated HPI 7/9/2025: Ayesha Martinez is a 74-year-old female that returns the office today with recurrent right posterior lateral hip pain and right knee pain.  She has had a trochanteric bursa injection in the right hip about a year and a half ago and that seemed to help up until recently.  Pain is aching and hurts if she moves her hip a certain way.  Pain is not within the groin.  Her right knee just darted hurting over the last month or 2 and hurts more when she sits with her knee bent for period of time and hurts around the kneecap.  She states that she has put on some weight recently as she had been fairly sedentary as her  was in hospice and she sat by the side until he passed away.  She rates her pain today at a 5 or 6/10, worse with prolonged sitting.      Previous HPI:Ayesha Martinez is a 73 year old female who complains of Right hip pain that started several weeks ago and has been intermittent in nature with severe pain when it does hurt.  She states that she did not injure it.  She states that pain radiates sometimes into the groin but over the lateral aspect of the hip as well and sometimes down the leg.  She states that it feels very similar to the pain that she had about 4 years ago when she had the trochanteric bursa injection.  She states that that injection helped quite a bit and she would like to try that again because she feels like this may be the same pain.  She does have

## 2025-07-09 NOTE — PROGRESS NOTES
Patient is in the office to discuss right hip pain. Patient states that her last injection was on 06/23/2023.

## 2025-07-17 ENCOUNTER — OFFICE VISIT (OUTPATIENT)
Dept: CARDIOLOGY CLINIC | Age: 75
End: 2025-07-17
Payer: MEDICARE

## 2025-07-17 VITALS
WEIGHT: 245 LBS | SYSTOLIC BLOOD PRESSURE: 118 MMHG | DIASTOLIC BLOOD PRESSURE: 70 MMHG | HEIGHT: 64 IN | BODY MASS INDEX: 41.83 KG/M2 | HEART RATE: 76 BPM

## 2025-07-17 DIAGNOSIS — I10 HYPERTENSION, ESSENTIAL: Primary | ICD-10-CM

## 2025-07-17 DIAGNOSIS — I82.512 CHRONIC DEEP VEIN THROMBOSIS (DVT) OF FEMORAL VEIN OF LEFT LOWER EXTREMITY (HCC): ICD-10-CM

## 2025-07-17 PROCEDURE — 99214 OFFICE O/P EST MOD 30 MIN: CPT | Performed by: NURSE PRACTITIONER

## 2025-07-17 PROCEDURE — 3078F DIAST BP <80 MM HG: CPT | Performed by: NURSE PRACTITIONER

## 2025-07-17 PROCEDURE — 1159F MED LIST DOCD IN RCRD: CPT | Performed by: NURSE PRACTITIONER

## 2025-07-17 PROCEDURE — G8417 CALC BMI ABV UP PARAM F/U: HCPCS | Performed by: NURSE PRACTITIONER

## 2025-07-17 PROCEDURE — 1090F PRES/ABSN URINE INCON ASSESS: CPT | Performed by: NURSE PRACTITIONER

## 2025-07-17 PROCEDURE — G8400 PT W/DXA NO RESULTS DOC: HCPCS | Performed by: NURSE PRACTITIONER

## 2025-07-17 PROCEDURE — 1160F RVW MEDS BY RX/DR IN RCRD: CPT | Performed by: NURSE PRACTITIONER

## 2025-07-17 PROCEDURE — 3074F SYST BP LT 130 MM HG: CPT | Performed by: NURSE PRACTITIONER

## 2025-07-17 PROCEDURE — G8427 DOCREV CUR MEDS BY ELIG CLIN: HCPCS | Performed by: NURSE PRACTITIONER

## 2025-07-17 PROCEDURE — 1036F TOBACCO NON-USER: CPT | Performed by: NURSE PRACTITIONER

## 2025-07-17 PROCEDURE — 1123F ACP DISCUSS/DSCN MKR DOCD: CPT | Performed by: NURSE PRACTITIONER

## 2025-07-17 ASSESSMENT — ENCOUNTER SYMPTOMS
SHORTNESS OF BREATH: 1
ORTHOPNEA: 0

## 2025-07-17 NOTE — PROGRESS NOTES
7/17/2025  Primary cardiologist: Dr. Villatoro    CC:   Ayesha  is an established 75 y.o.  female here for a follow up on testing      SUBJECTIVE/OBJECTIVE:  Ayesha is a 75 y.o. female with a history of pulmonary embolism, DVT, morbid obesity, pulmonary hypertension and hypertension    HPI:  Ayesha is being seen today to follow-up on her recent cardiovascular testing.  She states she had an occasional episode of back discomfort lasting for couple of minutes.  She has no shortness of breath at rest and with going up a flight of stairs.  She finds herself focusing on taking deep breaths    Review of Systems   Constitutional: Negative for diaphoresis and malaise/fatigue.   Cardiovascular:  Positive for dyspnea on exertion. Negative for chest pain, claudication, irregular heartbeat, leg swelling, near-syncope, orthopnea, palpitations and paroxysmal nocturnal dyspnea.   Respiratory:  Positive for shortness of breath.    Neurological:  Negative for dizziness and light-headedness.       Vitals:    07/17/25 1106   BP: 118/70   BP Site: Left Upper Arm   Patient Position: Sitting   BP Cuff Size: Large Adult   Pulse: 76   Weight: 111.1 kg (245 lb)   Height: 1.626 m (5' 4\")     Wt Readings from Last 3 Encounters:   07/17/25 111.1 kg (245 lb)   07/09/25 111.1 kg (245 lb)   07/07/25 111.1 kg (245 lb)      Body mass index is 42.05 kg/m².     Physical Exam  Vitals reviewed.   Eyes:      Pupils: Pupils are equal, round, and reactive to light.   Cardiovascular:      Rate and Rhythm: Normal rate and regular rhythm.      Pulses: Normal pulses.   Pulmonary:      Effort: Pulmonary effort is normal.      Breath sounds: No rales.   Musculoskeletal:      Right lower leg: No edema.      Left lower leg: No edema.   Skin:     General: Skin is warm and dry.      Capillary Refill: Capillary refill takes less than 2 seconds.      Comments: Chronic stasis dermatitis to the left lower leg.   Neurological:      Mental Status: She is alert and

## 2025-07-17 NOTE — PROGRESS NOTES
CLINICAL STAFF DOCUMENTATION         Ayesha Alvarengasid  1950  [id number]    Have you had any Chest Pain recently? - No          Have you had any Shortness of Breath - Yes  If Yes - When at rest and on exertion  How many flights of stairs can you go up without having SOB? - 2  Are you on Oxygen during the day or at night? - No  How many pillows do you sleep with under your head? - 1    Have you had any dizziness - No      Have you had any palpitations recently? - No      Do you have any edema - swelling in both legs , ankles and feet.       When did you have your last labs drawn 07/09/2025  What doctor ordered Rachel Villatoro MD   Do we have the labs in their chart Yes      Do you have a surgery or procedure scheduled in the near future - No        Caffeine? - No

## 2025-08-12 PROBLEM — I83.891 VARICOSE VEINS OF LOWER EXTREMITIES WITH COMPLICATIONS, RIGHT: Status: ACTIVE | Noted: 2025-08-12

## 2025-08-21 ENCOUNTER — TELEPHONE (OUTPATIENT)
Dept: FAMILY MEDICINE CLINIC | Age: 75
End: 2025-08-21

## 2025-08-25 RX ORDER — FOLIC ACID 1 MG/1
1 TABLET ORAL DAILY
Qty: 90 TABLET | Refills: 1 | Status: SHIPPED | OUTPATIENT
Start: 2025-08-25

## 2025-08-25 RX ORDER — PRAVASTATIN SODIUM 10 MG
10 TABLET ORAL DAILY
Qty: 90 TABLET | Refills: 1 | Status: SHIPPED | OUTPATIENT
Start: 2025-08-25 | End: 2026-02-21

## (undated) DEVICE — ESOPHAGEAL BALLOON DILATATION CATHETER: Brand: CRE FIXED WIRE

## (undated) DEVICE — SUTURE ETHBND EXCEL SZ 5 L30IN NONABSORBABLE GRN L40MM V-37 MB66G

## (undated) DEVICE — Device: Brand: POWER-FLO®

## (undated) DEVICE — ANESTHESIA CIRCUIT ADULT-LF: Brand: MEDLINE INDUSTRIES, INC.

## (undated) DEVICE — FORCEPS BX L240CM JAW DIA2.8MM L CAP W/ NDL MIC MESH TOOTH

## (undated) DEVICE — TUBING, SUCTION, 9/32" X 10', STRAIGHT: Brand: MEDLINE

## (undated) DEVICE — ENDOSCOPY KIT: Brand: MEDLINE INDUSTRIES, INC.

## (undated) DEVICE — TOWEL,OR,DSP,ST,BLUE,STD,6/PK,12PK/CS: Brand: MEDLINE

## (undated) DEVICE — GLOVE ORANGE PI 7 1/2   MSG9075

## (undated) DEVICE — SYRINGE INFL 60ML DISP ALLIANCE II

## (undated) DEVICE — 2108 SERIES SAGITTAL BLADE (19.5 X 1.27 X 90.0MM)

## (undated) DEVICE — Z INACTIVE USE 2660664 SOLUTION IRRIG 3000ML 0.9% SOD CHL USP UROMATIC PLAS CONT

## (undated) DEVICE — MANIFOLD CART ULT HI FLOW W 3 PRT FOR SUCT

## (undated) DEVICE — TRAY EPI 25GA L3.5IN CONTAIN BPA DEHP PVC PENCAN

## (undated) DEVICE — ELECTRODE ES AD CRDLSS PT RET REM POLYHESIVE

## (undated) DEVICE — GLOVE SURG SZ 65 THK91MIL LTX FREE SYN POLYISOPRENE

## (undated) DEVICE — SOLUTION IV 1000ML 0.9% SOD CHL FOR IRRIG PLAS CONT

## (undated) DEVICE — 2108 SERIES SAGITTAL FAN BLADE (33.0 X 0.88 X 64.0MM)

## (undated) DEVICE — COVER,TABLE,44X90,STERILE: Brand: MEDLINE

## (undated) DEVICE — LINER SUCT CANSTR 1500CC SEMI RIG W/ POR HYDROPHOBIC SHUT

## (undated) DEVICE — SURGICAL PROCEDURE PACK TOT KNEE LF

## (undated) DEVICE — SUTURE VCRL SZ 2-0 L18IN ABSRB UD CT-1 L36MM 1/2 CIR J839D

## (undated) DEVICE — SUTURE VCRL SZ 1 L27IN ABSRB UD L36MM CT-1 1/2 CIR JJ40G

## (undated) DEVICE — T4 HOOD

## (undated) DEVICE — CHLORAPREP 26ML ORANGE

## (undated) DEVICE — YANKAUER,FLEXIBLE HANDLE,REGLR CAPACITY: Brand: MEDLINE INDUSTRIES, INC.

## (undated) DEVICE — FAN SPRAY KIT: Brand: PULSAVAC®

## (undated) DEVICE — SPONGE LAP W18XL18IN WHT COT 4 PLY FLD STRUNG RADPQ DISP ST

## (undated) DEVICE — Device

## (undated) DEVICE — BLADE SAW W12.5XL70MM THK0.8MM CUT THK1.12MM S STL RECIP

## (undated) DEVICE — SOLUTION IV IRRIG WATER 1000ML POUR BRL 2F7114